# Patient Record
Sex: FEMALE | Race: BLACK OR AFRICAN AMERICAN | NOT HISPANIC OR LATINO | URBAN - METROPOLITAN AREA
[De-identification: names, ages, dates, MRNs, and addresses within clinical notes are randomized per-mention and may not be internally consistent; named-entity substitution may affect disease eponyms.]

---

## 2019-09-28 ENCOUNTER — INPATIENT (INPATIENT)
Facility: HOSPITAL | Age: 59
LOS: 5 days | Discharge: NURSING, REHAB OR LONG-TERM CARE | DRG: 65 | End: 2019-10-04
Attending: PSYCHIATRY & NEUROLOGY | Admitting: PSYCHIATRY & NEUROLOGY
Payer: COMMERCIAL

## 2019-09-28 VITALS
OXYGEN SATURATION: 99 % | SYSTOLIC BLOOD PRESSURE: 179 MMHG | HEART RATE: 88 BPM | TEMPERATURE: 98 F | RESPIRATION RATE: 18 BRPM | WEIGHT: 117.95 LBS | HEIGHT: 62 IN | DIASTOLIC BLOOD PRESSURE: 100 MMHG

## 2019-09-28 LAB
ALBUMIN SERPL ELPH-MCNC: 4.1 G/DL — SIGNIFICANT CHANGE UP (ref 3.3–5)
ALP SERPL-CCNC: 95 U/L — SIGNIFICANT CHANGE UP (ref 40–120)
ALT FLD-CCNC: 6 U/L — LOW (ref 10–45)
ANION GAP SERPL CALC-SCNC: 12 MMOL/L — SIGNIFICANT CHANGE UP (ref 5–17)
APTT BLD: 36.4 SEC — HIGH (ref 27.5–36.3)
AST SERPL-CCNC: 9 U/L — LOW (ref 10–40)
BASOPHILS # BLD AUTO: 0.1 K/UL — SIGNIFICANT CHANGE UP (ref 0–0.2)
BASOPHILS NFR BLD AUTO: 0.7 % — SIGNIFICANT CHANGE UP (ref 0–2)
BILIRUB SERPL-MCNC: 0.3 MG/DL — SIGNIFICANT CHANGE UP (ref 0.2–1.2)
BUN SERPL-MCNC: 48 MG/DL — HIGH (ref 7–23)
CALCIUM SERPL-MCNC: 10.3 MG/DL — SIGNIFICANT CHANGE UP (ref 8.4–10.5)
CHLORIDE SERPL-SCNC: 108 MMOL/L — SIGNIFICANT CHANGE UP (ref 96–108)
CO2 SERPL-SCNC: 21 MMOL/L — LOW (ref 22–31)
CREAT SERPL-MCNC: 1.74 MG/DL — HIGH (ref 0.5–1.3)
EOSINOPHIL # BLD AUTO: 0.1 K/UL — SIGNIFICANT CHANGE UP (ref 0–0.5)
EOSINOPHIL NFR BLD AUTO: 1.2 % — SIGNIFICANT CHANGE UP (ref 0–6)
GLUCOSE SERPL-MCNC: 182 MG/DL — HIGH (ref 70–99)
HCT VFR BLD CALC: 38.4 % — SIGNIFICANT CHANGE UP (ref 34.5–45)
HGB BLD-MCNC: 12.1 G/DL — SIGNIFICANT CHANGE UP (ref 11.5–15.5)
INR BLD: 0.85 RATIO — LOW (ref 0.88–1.16)
LYMPHOCYTES # BLD AUTO: 2 K/UL — SIGNIFICANT CHANGE UP (ref 1–3.3)
LYMPHOCYTES # BLD AUTO: 26 % — SIGNIFICANT CHANGE UP (ref 13–44)
MAGNESIUM SERPL-MCNC: 1.9 MG/DL — SIGNIFICANT CHANGE UP (ref 1.6–2.6)
MCHC RBC-ENTMCNC: 26.6 PG — LOW (ref 27–34)
MCHC RBC-ENTMCNC: 31.4 GM/DL — LOW (ref 32–36)
MCV RBC AUTO: 84.7 FL — SIGNIFICANT CHANGE UP (ref 80–100)
MONOCYTES # BLD AUTO: 0.5 K/UL — SIGNIFICANT CHANGE UP (ref 0–0.9)
MONOCYTES NFR BLD AUTO: 6.2 % — SIGNIFICANT CHANGE UP (ref 2–14)
NEUTROPHILS # BLD AUTO: 5.2 K/UL — SIGNIFICANT CHANGE UP (ref 1.8–7.4)
NEUTROPHILS NFR BLD AUTO: 66 % — SIGNIFICANT CHANGE UP (ref 43–77)
PHOSPHATE SERPL-MCNC: 3.8 MG/DL — SIGNIFICANT CHANGE UP (ref 2.5–4.5)
PLATELET # BLD AUTO: 277 K/UL — SIGNIFICANT CHANGE UP (ref 150–400)
POTASSIUM SERPL-MCNC: 5 MMOL/L — SIGNIFICANT CHANGE UP (ref 3.5–5.3)
POTASSIUM SERPL-SCNC: 5 MMOL/L — SIGNIFICANT CHANGE UP (ref 3.5–5.3)
PROT SERPL-MCNC: 7.9 G/DL — SIGNIFICANT CHANGE UP (ref 6–8.3)
PROTHROM AB SERPL-ACNC: 9.7 SEC — LOW (ref 10–12.9)
RBC # BLD: 4.54 M/UL — SIGNIFICANT CHANGE UP (ref 3.8–5.2)
RBC # FLD: 12.3 % — SIGNIFICANT CHANGE UP (ref 10.3–14.5)
SODIUM SERPL-SCNC: 141 MMOL/L — SIGNIFICANT CHANGE UP (ref 135–145)
WBC # BLD: 7.9 K/UL — SIGNIFICANT CHANGE UP (ref 3.8–10.5)
WBC # FLD AUTO: 7.9 K/UL — SIGNIFICANT CHANGE UP (ref 3.8–10.5)

## 2019-09-28 PROCEDURE — 99220: CPT

## 2019-09-28 PROCEDURE — 70450 CT HEAD/BRAIN W/O DYE: CPT | Mod: 26

## 2019-09-28 PROCEDURE — 93010 ELECTROCARDIOGRAM REPORT: CPT

## 2019-09-28 RX ORDER — INSULIN LISPRO 100/ML
3 VIAL (ML) SUBCUTANEOUS
Refills: 0 | Status: DISCONTINUED | OUTPATIENT
Start: 2019-09-28 | End: 2019-10-04

## 2019-09-28 RX ORDER — SODIUM CHLORIDE 9 MG/ML
1000 INJECTION, SOLUTION INTRAVENOUS
Refills: 0 | Status: DISCONTINUED | OUTPATIENT
Start: 2019-09-28 | End: 2019-10-04

## 2019-09-28 RX ORDER — DEXTROSE 50 % IN WATER 50 %
15 SYRINGE (ML) INTRAVENOUS ONCE
Refills: 0 | Status: DISCONTINUED | OUTPATIENT
Start: 2019-09-28 | End: 2019-10-04

## 2019-09-28 RX ORDER — INSULIN LISPRO 100/ML
VIAL (ML) SUBCUTANEOUS
Refills: 0 | Status: DISCONTINUED | OUTPATIENT
Start: 2019-09-28 | End: 2019-10-04

## 2019-09-28 RX ORDER — DEXTROSE 50 % IN WATER 50 %
25 SYRINGE (ML) INTRAVENOUS ONCE
Refills: 0 | Status: DISCONTINUED | OUTPATIENT
Start: 2019-09-28 | End: 2019-10-04

## 2019-09-28 RX ORDER — CLOPIDOGREL BISULFATE 75 MG/1
300 TABLET, FILM COATED ORAL ONCE
Refills: 0 | Status: COMPLETED | OUTPATIENT
Start: 2019-09-28 | End: 2019-09-28

## 2019-09-28 RX ORDER — ASPIRIN/CALCIUM CARB/MAGNESIUM 324 MG
81 TABLET ORAL DAILY
Refills: 0 | Status: DISCONTINUED | OUTPATIENT
Start: 2019-09-28 | End: 2019-10-04

## 2019-09-28 RX ORDER — DEXTROSE 50 % IN WATER 50 %
12.5 SYRINGE (ML) INTRAVENOUS ONCE
Refills: 0 | Status: DISCONTINUED | OUTPATIENT
Start: 2019-09-28 | End: 2019-10-04

## 2019-09-28 RX ORDER — GLUCAGON INJECTION, SOLUTION 0.5 MG/.1ML
1 INJECTION, SOLUTION SUBCUTANEOUS ONCE
Refills: 0 | Status: DISCONTINUED | OUTPATIENT
Start: 2019-09-28 | End: 2019-10-04

## 2019-09-28 RX ADMIN — Medication 81 MILLIGRAM(S): at 21:15

## 2019-09-28 RX ADMIN — Medication 3 UNIT(S): at 20:15

## 2019-09-28 RX ADMIN — CLOPIDOGREL BISULFATE 300 MILLIGRAM(S): 75 TABLET, FILM COATED ORAL at 21:15

## 2019-09-28 NOTE — ED CDU PROVIDER INITIAL DAY NOTE - OBJECTIVE STATEMENT
58 yo F PMHx R eye blindness, CVA 3 yrs ago with residual "veering to right side" and so uses cane, HTN, DM on Insulin and Janumet, presents for Right lower facial numbness and right hand numbness since 6:45pm last night after eating frog legs, described as pins and needles. The numbness persisted and has been constant, which brings her here for evaluation.   Denies HA, weakness, slurred speech, n/v, diplopia, blurred vision, dizziness, cp, sob, fever, cough. L sided numbness. Pt is right hand dominant and does have some trouble signing her name after event. PMD in [place]

## 2019-09-28 NOTE — ED PROVIDER NOTE - ATTENDING CONTRIBUTION TO CARE
attending Johnack: pt R-hand dominant with DMII on insulin, HTN p/w numbness to R lower face and R hand since yesterday. Reports prior TIA (asphasia and R hand clumsiness without residual). Denies HA. Neuro exam with subjective decreased sensation over palmar aspect of R hand and R lower face. Will obtain labs, CTH, likely neuro eval, reassess.

## 2019-09-28 NOTE — ED CDU PROVIDER INITIAL DAY NOTE - ATTENDING CONTRIBUTION TO CARE
I have seen and evaluated this patient with the [place] practice clinician.   I agree with the findings  unless other wise stated. I have amended notes where needed.  After my face to face bedside evaluation, I am noting: . Feels well, denies complaints at this time. No h/a, vision changes, new numbness/tingling, new weakness. +Chronically blind in R eye. Slight decreased sensation to R hand, reportedly unchanged from previous per pt. Otherwise neuro exam w/ no focal deficits or findings. Plan for MRI in AM. D/w neuro as note saws MRI brain w/o but no mention of MRAs however day PA put MRA orders in. They are agreeable w/ MRAs, feel appropriate studies so orders left in. Confirmed loading pt w/ plavix and ASA. I have seen and evaluated this patient with the [place] practice clinician.   I agree with the findings  unless other wise stated. I have amended notes where needed.  After my face to face bedside evaluation, I am noting: . Feels well, denies complaints at this time. No h/a, vision changes, new numbness/tingling, new weakness. +Chronically blind in R eye. Slight decreased sensation to R hand, reportedly unchanged from previous per pt. Otherwise neuro exam w/ no focal deficits or findings. Plan for MRI in AM. D/w neuro as note saws MRI brain w/o but no mention of MRAs however day PA put MRA orders in. They are agreeable w/ MRAs, feel appropriate studies so orders left in. Confirmed loading pt w/ plavix and ASA. Pt evaluated by Neuro has new onset of numbness right face and right hand no trauma has DM s/p CVA Alert poorly developed no distress MARNIE EOM wnl altered sensation right V2  and V3 area and right hand only vascular intact No carotid bruit will have neuro check Brain MRI CDU observation --Ross

## 2019-09-28 NOTE — ED PROVIDER NOTE - PHYSICAL EXAMINATION
PHYSICAL EXAM:  GENERAL: non-toxic appearing; in no respiratory distress  HEAD: Atraumatic, Normocephalic;  NECK: No JVD; FROM  EYES: PERRL, EOMs intact b/l w/out deficits  CHEST/LUNG: CTAB no wheezes/rhonchi/rales  HEART: RRR no murmur/gallops/rubs  ABDOMEN: +BS, soft, NT, ND  EXTREMITIES: No LE edema, +2 radial pulses b/l  MUSCULOSKELETAL: FROM of all 4 extremities;  NERVOUS SYSTEM:  A&Ox3, No motor deficits. CNII-XII intact; Decreased sensation on Right hand diffusely vs left hand; sensations intact to her upper and lower face. no facial droop or slurred speech  SKIN:  No new rashes

## 2019-09-28 NOTE — ED CDU PROVIDER INITIAL DAY NOTE - MEDICAL DECISION MAKING DETAILS
60 yo F PMHx R eye blindness, CVA 3 yrs ago with residual "veering to right side" and so uses cane, HTN, DM on Insulin and Janumet, presents for Right lower facial numbness and right hand numbness since 6:45pm last night after eating frog legs, described as pins and needles. The numbness persisted and has been constant, which brings her here for evaluation.   Denies HA, weakness, slurred speech, n/v, diplopia, blurred vision, dizziness, cp, sob, fever, cough. L sided numbness. Pt is right hand dominant and does have some trouble signing her name after event.

## 2019-09-28 NOTE — ED CDU PROVIDER INITIAL DAY NOTE - PROGRESS NOTE DETAILS
Patient seen at bedside in NAD.  VSS.  Patient resting comfortably without complaints. Feels well, denies complaints at this time. No h/a, vision changes, new numbness/tingling, new weakness. +Chronically blind in R eye. neuro exam w/ no focal deficits or findings. Plan for MRI in AM. D/w neuro as note saws MRI brain w/o but no mention of MRAs however day PA put MRA orders in. They are agreeable w/ MRAs, feel appropriate studies so orders left in. Confirmed loading pt w/ plavix and ASA. Orders in. Will continue to monitor. - Giles Martinez PA-C Patient seen at bedside in NAD.  VSS.  Patient resting comfortably without complaints. Feels well, denies complaints at this time. No h/a, vision changes, new numbness/tingling, new weakness. +Chronically blind in R eye. Slight decreased sensation to R hand, reportedly unchanged from previous per pt. Otherwise neuro exam w/ no focal deficits or findings. Plan for MRI in AM. D/w neuro as note saws MRI brain w/o but no mention of MRAs however day PA put MRA orders in. They are agreeable w/ MRAs, feel appropriate studies so orders left in. Confirmed loading pt w/ plavix and ASA. Orders in. Will continue to monitor. - Giles Martinez PA-C

## 2019-09-28 NOTE — ED PROVIDER NOTE - NS ED ROS FT
Constitutional: no fevers or chills  HEENT: no visual changes, no sore throat, no rhinorrhea  CV: no cp or palpitations  Resp: no sob or cough  GI: no abd pain, no nausea, no vomiting, no diarrhea, no constipation  : no dysuria, no hematuria  MSK: no myalgais or arthralgias  skin: no rashes  neuro: no HA, +numbness; no weakness  ROS statement: all other ROS negative except as per HPI

## 2019-09-28 NOTE — ED PROVIDER NOTE - PROGRESS NOTE DETAILS
paged neuro. awaiting call back disscussed case w/ neuro. will see pt neuro recommending cdu for mri. spoke w/ cdu pa who will see pt Pt evaluated by Neuro has new onset of numbness right face and right hand no trauma has DM s/p CVA Alert poorly developed no distress MARNIE EOM wnl altered sensation right V2  and V3 area and right hand only vascular intact No carotid bruit will have neuro check Brain MRI CDU observation --Ross

## 2019-09-28 NOTE — ED PROVIDER NOTE - CLINICAL SUMMARY MEDICAL DECISION MAKING FREE TEXT BOX
Continue lisinopril 60 yo F PMHx CVA 3 years ago w/o residual deficits, htn, dm, presents for constant numbness to right hand and right lower face. no motor deficits or slurred speech. exam shows subjective decreased sensation to right hand vs left. no slurred speech or facial droop. will obtain ct head, labs, and consult neuro for possible cdu vs outpatient mri.

## 2019-09-28 NOTE — CONSULT NOTE ADULT - ASSESSMENT
Assessment: 59 year old RH F with a PMH of stroke? 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 LKN was 18:30 on 9/27. Physical exam significant for right hand numbness. CTH w/o showed no acute findings.     Impression: Right face/hand numbness possibly secondary to left brain dysfunction from an ischemic stroke of unclear mechanism (likely lacunar stroke)    Plan:  Imaging:   MRI brain without contrast    Labs:  Hemoglobin A1c and lipid panel    Medications:  Start aspirin 81 mg daily.  Load with Plavix 300 mg once. Then starting 9/29 give plavix 75 mg daily for three weeks, then aspirin alone as per [facility] protocol.  Continue atorvastatin 80 mg nightly.    Other:  Dysphagia screen  Permissive HTN (not to exceed 220/110 until 9/29, then gradual normotension)  Neurochecks q4h  Patient requires outpatient neurology follow up. (Either [facility] neurologist or Nadine Jarrett, AJAY [phone], [address] [facility])    Case to be discussed with stroke attending, Dr. Hernández. Assessment: 59 year old RH F with a PMH of stroke? 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 LKN was 18:30 on 9/27. Physical exam significant for right hand numbness. CTH w/o showed no acute findings.     Impression: Right face/hand numbness possibly secondary to left brain dysfunction from an ischemic stroke of unclear mechanism (likely lacunar stroke)    Plan:  Imaging:   MRI brain without contrast  MRA head without contrast, MRA neck with contrast    Labs:  Hemoglobin A1c and lipid panel    Medications:  Start aspirin 81 mg daily.  Load with Plavix 300 mg once. Then starting 9/29 give plavix 75 mg daily for three weeks, then aspirin alone as per [facility] protocol.  Continue atorvastatin 80 mg nightly.    Other:  Dysphagia screen  Permissive HTN (not to exceed 220/110 until 9/29, then gradual normotension)  Neurochecks q4h  Patient requires outpatient neurology follow up. (Either [facility] neurologist or Nadine Jarrett, AJAY [phone], [address] [place] [address])    Case to be discussed with stroke attending, Dr. Hernández.

## 2019-09-28 NOTE — ED ADULT NURSE NOTE - OBJECTIVE STATEMENT
58 yo f pmhx of HTN, DM, CVA present to the ED with c/o right lower facial numbness and right hand numbness not radiating. PT reports she started feeling the numbness around 6:30 last night, states she felt it after having a meal. Pt reports the numbness does not radiate but its also at the right lower face. Pt reports not checking her blood glucose frequently because is afraid of needles but wants to be assessed while in the ED. Pt has pos and equal sensation to extremities bilat, but reports numbness, pos and equal strength to extremities x 4, strong peripheral pulses x 4. Pt denies headache, dizziness, chest pain, palpitations, cough, SOB, abdominal pain, n/v/d, urinary symptoms, fevers, chills, blurry vision, HA.

## 2019-09-28 NOTE — ED ADULT TRIAGE NOTE - CHIEF COMPLAINT QUOTE
Right hand, and R facial numbness onset last night after eating "frog legs" . PMH CVA two years ago, and DM.

## 2019-09-29 DIAGNOSIS — R20.0 ANESTHESIA OF SKIN: ICD-10-CM

## 2019-09-29 LAB
CHOLEST SERPL-MCNC: 310 MG/DL — HIGH (ref 10–199)
GLUCOSE BLDC GLUCOMTR-MCNC: 105 MG/DL — HIGH (ref 70–99)
HBA1C BLD-MCNC: 9.4 % — HIGH (ref 4–5.6)
HDLC SERPL-MCNC: 49 MG/DL — LOW
LIPID PNL WITH DIRECT LDL SERPL: 211 MG/DL — HIGH
TOTAL CHOLESTEROL/HDL RATIO MEASUREMENT: 6.3 RATIO — SIGNIFICANT CHANGE UP (ref 3.3–7.1)
TRIGL SERPL-MCNC: 248 MG/DL — HIGH (ref 10–149)
TSH SERPL-MCNC: 1.38 UIU/ML — SIGNIFICANT CHANGE UP (ref 0.27–4.2)

## 2019-09-29 PROCEDURE — 70551 MRI BRAIN STEM W/O DYE: CPT | Mod: 26

## 2019-09-29 PROCEDURE — 70547 MR ANGIOGRAPHY NECK W/O DYE: CPT | Mod: 26

## 2019-09-29 PROCEDURE — 70544 MR ANGIOGRAPHY HEAD W/O DYE: CPT | Mod: 26,59

## 2019-09-29 PROCEDURE — 99223 1ST HOSP IP/OBS HIGH 75: CPT

## 2019-09-29 PROCEDURE — 99217: CPT

## 2019-09-29 RX ORDER — INSULIN ASPART 100 [IU]/ML
10 INJECTION, SOLUTION SUBCUTANEOUS
Qty: 0 | Refills: 0 | DISCHARGE

## 2019-09-29 RX ORDER — ATORVASTATIN CALCIUM 80 MG/1
80 TABLET, FILM COATED ORAL AT BEDTIME
Refills: 0 | Status: DISCONTINUED | OUTPATIENT
Start: 2019-09-29 | End: 2019-10-04

## 2019-09-29 RX ORDER — LATANOPROST 0.05 MG/ML
1 SOLUTION/ DROPS OPHTHALMIC; TOPICAL
Qty: 0 | Refills: 0 | DISCHARGE

## 2019-09-29 RX ORDER — ENOXAPARIN SODIUM 100 MG/ML
30 INJECTION SUBCUTANEOUS DAILY
Refills: 0 | Status: DISCONTINUED | OUTPATIENT
Start: 2019-09-29 | End: 2019-10-04

## 2019-09-29 RX ORDER — SITAGLIPTIN AND METFORMIN HYDROCHLORIDE 500; 50 MG/1; MG/1
1 TABLET, FILM COATED ORAL
Qty: 0 | Refills: 0 | DISCHARGE

## 2019-09-29 RX ORDER — INSULIN DETEMIR 100/ML (3)
0 INSULIN PEN (ML) SUBCUTANEOUS
Qty: 0 | Refills: 0 | DISCHARGE

## 2019-09-29 RX ADMIN — ATORVASTATIN CALCIUM 80 MILLIGRAM(S): 80 TABLET, FILM COATED ORAL at 22:30

## 2019-09-29 RX ADMIN — Medication 81 MILLIGRAM(S): at 09:45

## 2019-09-29 RX ADMIN — Medication 3 UNIT(S): at 13:09

## 2019-09-29 RX ADMIN — Medication 3 UNIT(S): at 09:41

## 2019-09-29 NOTE — H&P ADULT - NSHPSOCIALHISTORY_GEN_ALL_CORE
T/E/D: last smoked/last used alcohol 10 years ago. patient used to smoke 1 pack during the weekend.   Occupation: retired (worked in police department)

## 2019-09-29 NOTE — H&P ADULT - NSICDXFAMILYHX_GEN_ALL_CORE_FT
FAMILY HISTORY:  Family history of cerebrovascular accident (CVA)  No pertinent family history in first degree relatives

## 2019-09-29 NOTE — ED CDU PROVIDER SUBSEQUENT DAY NOTE - PROGRESS NOTE DETAILS
Patient sleeping. NAD. No complaints. VSS. No interval changes from previous CDU note. No events on tele. Will continue to monitor. - Giles Martinez PA-C MOUNIKA Jarvis: Patient seen at bedside in NAD.  VSS.  Patient resting comfortably but does note decreased right hand sensation. No events noted over tele. Subjective numbness on neuro exam otherwise neuro intact for patient baseline. No events noted over tele. pending MRI and neuro input MOUNIKA Jarvis: made neuro PA Riri aware final MRI report and will not be able to see patient for some time as attending is in IR case. Patient and family aware MOUNIKA Jarvis: Patient seen at bedside in NAD.  VSS.  Patient resting comfortably without complaints. pending neuro recommendations. no events noted over tele. MOUNIKA Jarvis: spoke to neuro MOUNIKA Menezes and attending just got out of OR. should be in CDU shortly MOUNIKA Jarvis: patient evaluated by neuro attending Dr. Ortiz for left thalamic infarct and recommended admission to neuro service for endovascular treatment.

## 2019-09-29 NOTE — H&P ADULT - ASSESSMENT
59 year old RH F with a PMH of stroke? 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 LKN was 18:30 on 9/27. Physical exam significant for right hand numbness. CTH w/o showed no acute findings.     Impression: Right face/hand numbness possibly secondary to left brain dysfunction from an ischemic stroke of unclear mechanism (likely lacunar stroke)    Plan:  Imaging:   MRI brain without contrast  MRA head without contrast, MRA neck with contrast    Labs:  Hemoglobin A1c and lipid panel    Medications:  Start aspirin 81 mg daily.  Load with Plavix 300 mg once. Then starting 9/29 give plavix 75 mg daily for three weeks, then aspirin alone as per [facility] protocol.  Continue atorvastatin 80 mg nightly.    Other:  Dysphagia screen  Permissive HTN (not to exceed 220/110 until 9/29, then gradual normotension)  Neurochecks q4h  Patient requires outpatient neurology follow up. (Either [facility] neurologist or Nadine Jarrett, [phone], [address] [facility])

## 2019-09-29 NOTE — H&P ADULT - ATTENDING COMMENTS
Agree with history and examination as stated above. Patient was examined today and neurological exam was pertinent for right upper extremities paresthesias. Neuroimaging: Brain MRI revealed restricted diffusion at the left thalamus. Head and neck MRA with areas of significant constriction follow by dilation (beads in a string pattern) at  the anterior and posterior cerebral arteries concerning for vasculitis.    Impression diagnosis-cerebral thrombosis with infarction etiology small vessel disease will evaluate for vasculitis.     Plan:   1. Aspirin for secondary stroke prevention measures  2.  goal <70 high dose statin initiated  3. HgA1C 9.4 recommended endocrine for therapy adjustment and diabetes education   4. Work up for vasculitis-ESR  CRP  MARILOU  DSDNA  ANCA Anti Ro- Anti LA  ELO HIV   Toxicology in urine and serum  5. Adequate hydration and follow up BMP in the setting of kidney injury unknown baseline  6. LP   7. Will consider cerebral angiogram to definitively exclude vasculitis  8. Rheumatology consult   9. Pharmacological DVT prophylaxis   10. PT/PTT/OT

## 2019-09-29 NOTE — ED CDU PROVIDER DISPOSITION NOTE - NSFOLLOWUPINSTRUCTIONS_ED_ALL_ED_FT
1. Follow up with your PMD in 1-2 days, please have your creatinine level repeated with your PMD. Additionally please follow up with either your neurologist or NP Nadine Jarrett at our neurology clinic ([address] [place] [place] [address]; [phone]) within 2-3 days. Please call to schedule an appointment. Please bring a copy of all results with you to your appointments.   2. Rest, keep self well hydrated. Continue home medications as prescribed, Stroke education packet was given to you for further information.  3. Start Plavix 75 mg once daily for 3 weeks in addition to Aspirin 81 mg daily. After 3 weeks, stop Plavix and continue Aspirin. Continue Atorvastatin 80 mg every night.  4. Return for any weakness, numbness, change in speech or vision, problems walking or any other concerning symptoms.

## 2019-09-29 NOTE — ED ADULT NURSE REASSESSMENT NOTE - NIH STROKE SCALE: 8. SENSORY, QM
(0) Normal; no sensory loss
(0) Normal; no sensory loss
(1) Mild-to-moderate sensory loss; patient feels pinprick is less sharp or is dull on the affected side; or there is a loss of superficial pain with pinprick, but patient is aware of being touched

## 2019-09-29 NOTE — ED CDU PROVIDER SUBSEQUENT DAY NOTE - MUSCULOSKELETAL, MLM
Spine appears normal, range of motion is not limited, no muscle or joint tenderness Spine appears normal, range of motion is not limited, no muscle or joint tenderness. 5/5 strength UE/LE b/l.

## 2019-09-29 NOTE — H&P ADULT - NSICDXPASTMEDICALHX_GEN_ALL_CORE_FT
PAST MEDICAL HISTORY:  CVA (cerebral vascular accident)     Diabetes     Diabetes mellitus     Hypertension

## 2019-09-29 NOTE — ED CDU PROVIDER SUBSEQUENT DAY NOTE - HISTORY
Patient in CDU for further workup of R side facial and hand numbness w/ MRI/MRA to r/o stroke. Patient seen at bedside in NAD.  VSS.  Patient resting comfortably without complaints. No events on tele. No interval changes from previous CDU note. Pt still endorsing decreased sensation to R hand which is appreciable on exam, unchanged from previous. Denies headache, n/v/d, new weakness, speech/visual changes, cp, sob, neck pain. Appears very well. Plan for MRI/MRA in AM. Will continue to closely monitor. - Giles Martinez PA-C

## 2019-09-29 NOTE — ED ADULT NURSE REASSESSMENT NOTE - GENERAL PATIENT STATE
comfortable appearance/cooperative/improvement verbalized
improvement verbalized/cooperative/smiling/interactive/comfortable appearance/resting/sleeping
smiling/interactive/comfortable appearance/cooperative

## 2019-09-29 NOTE — ED ADULT NURSE REASSESSMENT NOTE - COMFORT CARE
darkened lights/plan of care explained/warm blanket provided/ambulated to bathroom/meal provided/po fluids offered/repositioned
meal provided/po fluids offered/plan of care explained/ambulated to bathroom
plan of care explained/po fluids offered

## 2019-09-29 NOTE — ED CDU PROVIDER DISPOSITION NOTE - CLINICAL COURSE
58 yo female pmhx CVA w/ residual rightward gait deviation, HTN, T2DM on insulin, R eye blindness presents to the ED c/o R facial and R hand numbness that began last night after eating frog legs. Symptoms have been constant since onset. No associated pain w/ sxs. Pt came to ED for persistent symptoms. Denies headache, n/v/d, speech/visual changes, fall, head trauma, chest pain, shortness of breath, dizziness, neck pain.  ED Course: Labs notable for Cr of 1.74 CTH negative for acute pathology. Neurology consulted recommended MRI head and MRA h/n for further evaluation to r/o stroke, Plavix 300 mg x 1 dose followed by 75 mg daily for 3 weeks along with ASA. Pt did well in CDU overnight. Exam remained c/w R hand numbness compared to L but revealed no other focal neurological deficits. No adverse events on tele. MRI/MRA showed _________________. 60 yo female pmhx CVA w/ residual rightward gait deviation, HTN, T2DM on insulin, R eye blindness presents to the ED c/o R facial and R hand numbness that began last night after eating frog legs. Symptoms have been constant since onset. No associated pain w/ sxs. Pt came to ED for persistent symptoms. Denies headache, n/v/d, speech/visual changes, fall, head trauma, chest pain, shortness of breath, dizziness, neck pain.  ED Course: Labs notable for Cr of 1.74 CTH negative for acute pathology. Neurology consulted recommended MRI head and MRA h/n for further evaluation to r/o stroke, Plavix 300 mg x 1 dose followed by 75 mg daily for 3 weeks along with ASA. Pt did well in CDU overnight. Exam remained c/w R hand numbness compared to L but revealed no other focal neurological deficits. No adverse events on tele. No new symptoms were reported. MRI/MRA showed _________________. 58 yo female pmhx CVA w/ residual rightward gait deviation, HTN, T2DM on insulin, R eye blindness presents to the ED c/o R facial and R hand numbness that began last night after eating frog legs. Symptoms have been constant since onset. No associated pain w/ sxs. Pt came to ED for persistent symptoms. Denies headache, n/v/d, speech/visual changes, fall, head trauma, chest pain, shortness of breath, dizziness, neck pain.  ED Course: Labs notable for Cr of 1.74 CTH negative for acute pathology. Neurology consulted recommended MRI head and MRA h/n for further evaluation to r/o stroke, Plavix 300 mg x 1 dose followed by 75 mg daily for 3 weeks along with ASA. Pt did well in CDU overnight. Exam remained c/w R hand numbness compared to L but revealed no other focal neurological deficits. No adverse events on tele. No new symptoms were reported.  patient evaluated by neuro attending Dr. Ortiz for left thalamic infarct seen on MRI and recommended admission to neuro service for endovascular treatment. ED attending Dr. Vela aware of above and agreed

## 2019-09-29 NOTE — H&P ADULT - HISTORY OF PRESENT ILLNESS
59 year old RH F with a PMH of stroke? 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 LKN was 18:30 on 9/27.  Patient had previously taken aspirin, but does not remember why she decided to stop taking it. Patient does not have a neurologist. Patient ambulates with a cane due to right sided weakness/states she drifts to the right without a cane for the past 3 years. Patient states that she gets physical therapy twice per week.     NIHSS: 1 (right hand numbness)  MRS: 2

## 2019-09-29 NOTE — ED CDU PROVIDER SUBSEQUENT DAY NOTE - ATTENDING CONTRIBUTION TO CARE
59F placed in CDU for r/o stroke. ED course - Labs notable for Cr of 1.74 CTH negative for acute pathology. Neurology consulted recommended MRI head and MRA for further evaluation to r/o stroke, Plavix 300 mg x 1 dose followed by 75 mg daily for 3 weeks along with ASA. Exam remained c/w R hand numbness compared to L but revealed no other focal neurological deficits. No adverse events on tele. No new symptoms were reported.  Patient seen and examined at bedside, no acute overnight events, reports feeling better. During afternoon rounds patient evaluated by neuro attending Dr. Ortiz for left thalamic infarct seen on MRI and recommended admission to neuro service for endovascular treatment.    PHYSICAL EXAMINATION:  VITALS REVIEWED.  VS normal  GENERALIZED APPEARANCE:  Comfortable, no acute distress, ambulating without difficulty  SKIN:  Warm, dry, no cyanosis  HEAD:  No obvious scalp lesions  EYES:  Conjunctiva pink, no icterus  ENMT:  Mucus membranes moist, no stridor  NECK:  Supple, non-tender  CHEST AND RESPIRATORY:  Clear to auscultation B/L, good air entry B/L, equal chest expansion  HEART AND CARDIOVASCULAR:  Regular rate, no obvious murmur  ABDOMEN AND GI:  Soft, non-tender, non-distended.  No rebound, no guarding  EXTREMITIES:  No deformity, edema, or calf tenderness  NEURO: AAOx3, right upper extremity  Diminished sensation to R hand to light touch compared to L hand, CN2-12 intact, no dysmetria, no dysdiadochokinesia    Impression:  Stroke; L thalamic infarct ?vasculitis v. vasospasm  No acute complaints at this time.

## 2019-09-29 NOTE — ED ADULT NURSE REASSESSMENT NOTE - NIH STROKE SCALE: 1B. LOC QUESTIONS, QM
(0) Answers both questions correctly

## 2019-09-29 NOTE — H&P ADULT - NSHPPHYSICALEXAM_GEN_ALL_CORE
Vital Signs Last 24 Hrs  T(C): 36.5 (29 Sep 2019 13:20), Max: 36.8 (28 Sep 2019 20:04)  T(F): 97.7 (29 Sep 2019 13:20), Max: 98.3 (28 Sep 2019 20:04)  HR: 77 (29 Sep 2019 13:20) (65 - 83)  BP: 141/89 (29 Sep 2019 13:20) (125/80 - 165/106)  BP(mean): --  RR: 19 (29 Sep 2019 13:20) (17 - 19)  SpO2: 100% (29 Sep 2019 13:20) (99% - 100%)    General: Female, appears stated age, in no apparent distress including pain  Neurological (>12):  MS: Awake, alert, oriented to person, place, situation, time. Normal affect. Follows all commands.  Language: Speech is clear, fluent with good repetition & comprehension (able to name objects)  CNs: Visual acuity OD: NLP, VFF OS only. EOMI no nystagmus. V1-3 intact to LT, no facial asymmetry b/l, hearing grossly normal (rubbing fingers) b/l. Symmetric palate elevation in midline. Gag reflex deferred. Head turning & shoulder shrug intact b/l. Tongue midline, normal movements, no atrophy.  Fundoscopic: pale w/ sharp discs margins No vascular changes.    Motor: Normal muscle bulk & tone. No noticeable tremor or seizure. No pronator drift. No motor drift.              Deltoid	Biceps	Triceps	Wrist	Finger ABd	   R	4+	4+	4+	4+	4+		4+ 	  L	4+	4+	4+	4+	4+		4+    	H-Flex	H-Ext			K-Flex	K-Ext	D-Flex	P-Flex  R	4+	5			4	4	5	5 	   L	5	5			4+	4	5	5	     Sensation: decreased to temperature/LT over C6/7/8 dermatomes of right hand.   Cortical: Extinction on DSS (neglect): none  Reflexes:              Biceps(C5)       BR(C6)     Triceps(C7)               Patellar(L4)    Achilles(S1)    Plantar Resp  R	2	          2	             2		        2		    2		equivocal  L	2	          2	             2		        2		    2		equivocal    Coordination: No dysmetria to FTN/HTS  Gait: unable to assess

## 2019-09-29 NOTE — ED ADULT NURSE REASSESSMENT NOTE - NS ED NURSE REASSESS COMMENT FT1
Report given to CDU RN Kaya.
report taken from Kaya PASTRANA. states pt had good night with no complaints. Will continue to monitor.
Received pt from VICTORIANO Little , received pt alert and responsive, oriented x4, denies any respiratory distress, SOB, or difficulty breathing. Pt transferred to CDU for MRI,. no deficits noted. Pt denies pain.  On telemetry pt is SR on monitor hr: 83. IV in place, patent and free of signs of infiltration, pt denies chest pain or palpitations, V/S stable, pt afebrile.  Pt educated on unit and unit rules, instructed patient to notify RN of any needed assistance, Pt verbalizes understanding, Call bell placed within reach. Safety maintained. Will continue to monitor.
Pt received from VICTORIANO Huerta. Pt oriented to CDU & plan of care was discussed. Pt AO4. Neuro check intact except pt endorses R hand numbness & R face numbness. No other deficits noted. Pt denies any weakness or headache. Safety & comfort measures maintained. Call bell in reach. Will continue to monitor.

## 2019-09-30 LAB
ANION GAP SERPL CALC-SCNC: 10 MMOL/L — SIGNIFICANT CHANGE UP (ref 5–17)
ANION GAP SERPL CALC-SCNC: 12 MMOL/L — SIGNIFICANT CHANGE UP (ref 5–17)
BUN SERPL-MCNC: 55 MG/DL — HIGH (ref 7–23)
BUN SERPL-MCNC: 60 MG/DL — HIGH (ref 7–23)
CALCIUM SERPL-MCNC: 10.3 MG/DL — SIGNIFICANT CHANGE UP (ref 8.4–10.5)
CALCIUM SERPL-MCNC: 10.4 MG/DL — SIGNIFICANT CHANGE UP (ref 8.4–10.5)
CHLORIDE SERPL-SCNC: 104 MMOL/L — SIGNIFICANT CHANGE UP (ref 96–108)
CHLORIDE SERPL-SCNC: 110 MMOL/L — HIGH (ref 96–108)
CHOLEST SERPL-MCNC: 384 MG/DL — HIGH (ref 10–199)
CO2 SERPL-SCNC: 13 MMOL/L — LOW (ref 22–31)
CO2 SERPL-SCNC: 20 MMOL/L — LOW (ref 22–31)
CREAT SERPL-MCNC: 1.8 MG/DL — HIGH (ref 0.5–1.3)
CREAT SERPL-MCNC: 1.93 MG/DL — HIGH (ref 0.5–1.3)
CRP SERPL-MCNC: 0.16 MG/DL — SIGNIFICANT CHANGE UP (ref 0–0.4)
DSDNA AB FLD-ACNC: <0.2 AI — SIGNIFICANT CHANGE UP
ENA SS-A AB FLD IA-ACNC: <0.2 AI — SIGNIFICANT CHANGE UP
ERYTHROCYTE [SEDIMENTATION RATE] IN BLOOD: 104 MM/HR — HIGH (ref 0–20)
ESTIMATED AVERAGE GLUCOSE: 229 MG/DL — HIGH (ref 68–114)
GLUCOSE BLDC GLUCOMTR-MCNC: 129 MG/DL — HIGH (ref 70–99)
GLUCOSE BLDC GLUCOMTR-MCNC: 130 MG/DL — HIGH (ref 70–99)
GLUCOSE BLDC GLUCOMTR-MCNC: 153 MG/DL — HIGH (ref 70–99)
GLUCOSE SERPL-MCNC: 150 MG/DL — HIGH (ref 70–99)
GLUCOSE SERPL-MCNC: 153 MG/DL — HIGH (ref 70–99)
HBA1C BLD-MCNC: 9.6 % — HIGH (ref 4–5.6)
HBA1C BLD-MCNC: 9.6 % — HIGH (ref 4–5.6)
HCT VFR BLD CALC: 41.6 % — SIGNIFICANT CHANGE UP (ref 34.5–45)
HCV AB S/CO SERPL IA: 0.08 S/CO — SIGNIFICANT CHANGE UP (ref 0–0.99)
HCV AB SERPL-IMP: SIGNIFICANT CHANGE UP
HDLC SERPL-MCNC: 64 MG/DL — SIGNIFICANT CHANGE UP
HGB BLD-MCNC: 12.2 G/DL — SIGNIFICANT CHANGE UP (ref 11.5–15.5)
LIPID PNL WITH DIRECT LDL SERPL: 270 MG/DL — HIGH
MCHC RBC-ENTMCNC: 25.6 PG — LOW (ref 27–34)
MCHC RBC-ENTMCNC: 29.3 GM/DL — LOW (ref 32–36)
MCV RBC AUTO: 87.4 FL — SIGNIFICANT CHANGE UP (ref 80–100)
PLATELET # BLD AUTO: 244 K/UL — SIGNIFICANT CHANGE UP (ref 150–400)
POTASSIUM SERPL-MCNC: 5.1 MMOL/L — SIGNIFICANT CHANGE UP (ref 3.5–5.3)
POTASSIUM SERPL-MCNC: 5.5 MMOL/L — HIGH (ref 3.5–5.3)
POTASSIUM SERPL-SCNC: 5.1 MMOL/L — SIGNIFICANT CHANGE UP (ref 3.5–5.3)
POTASSIUM SERPL-SCNC: 5.5 MMOL/L — HIGH (ref 3.5–5.3)
RBC # BLD: 4.76 M/UL — SIGNIFICANT CHANGE UP (ref 3.8–5.2)
RBC # FLD: 13.3 % — SIGNIFICANT CHANGE UP (ref 10.3–14.5)
SODIUM SERPL-SCNC: 134 MMOL/L — LOW (ref 135–145)
SODIUM SERPL-SCNC: 135 MMOL/L — SIGNIFICANT CHANGE UP (ref 135–145)
TOTAL CHOLESTEROL/HDL RATIO MEASUREMENT: 6 RATIO — SIGNIFICANT CHANGE UP (ref 3.3–7.1)
TRIGL SERPL-MCNC: 251 MG/DL — HIGH (ref 10–149)
WBC # BLD: 6.15 K/UL — SIGNIFICANT CHANGE UP (ref 3.8–10.5)
WBC # FLD AUTO: 6.15 K/UL — SIGNIFICANT CHANGE UP (ref 3.8–10.5)

## 2019-09-30 PROCEDURE — 99233 SBSQ HOSP IP/OBS HIGH 50: CPT

## 2019-09-30 PROCEDURE — 99223 1ST HOSP IP/OBS HIGH 75: CPT | Mod: GC

## 2019-09-30 PROCEDURE — 71045 X-RAY EXAM CHEST 1 VIEW: CPT | Mod: 26

## 2019-09-30 PROCEDURE — 99222 1ST HOSP IP/OBS MODERATE 55: CPT | Mod: GC

## 2019-09-30 RX ADMIN — Medication 3 UNIT(S): at 18:08

## 2019-09-30 RX ADMIN — Medication 3 UNIT(S): at 13:02

## 2019-09-30 RX ADMIN — Medication 1: at 08:50

## 2019-09-30 RX ADMIN — ATORVASTATIN CALCIUM 80 MILLIGRAM(S): 80 TABLET, FILM COATED ORAL at 23:02

## 2019-09-30 RX ADMIN — Medication 81 MILLIGRAM(S): at 12:18

## 2019-09-30 RX ADMIN — Medication 3 UNIT(S): at 08:51

## 2019-09-30 RX ADMIN — ENOXAPARIN SODIUM 30 MILLIGRAM(S): 100 INJECTION SUBCUTANEOUS at 12:18

## 2019-09-30 NOTE — CONSULT NOTE ADULT - ASSESSMENT
ASSESSMENT    59y Female with functional deficits after acute left thalamic infarct    Acute left thalamic infarct - management as per neurology, aspirin  DM2 - insulin, sliding scale  HLD - atorvastatin  Pain - Tylenol  DVT PPX - SCDs, Lovenox  Diet - Regular  Rehab -   Continue bedside therapy as well as OOB throughout the day with mobilization by staff to maintain cardiopulmonary function and prevention of secondary complications related to debility.   PT- ROM, Bed Mobility, transfers, amb w AD, GCEs  OT- ADLs, energy conservation  SLP- Dysphagia eval and tx    Recommend ACUTE inpatient rehabilitation for the functional deficits consisting of 3 hours of therapy/day & 24 hour RN/daily PMR physician for comorbid medical management. Patient will be able to tolerate 3 hours a day.    Will continue to follow for ongoing rehab needs and recommendations. . Functional progress will determine ongoing rehab dispo recommendations, which may change.

## 2019-09-30 NOTE — OCCUPATIONAL THERAPY INITIAL EVALUATION ADULT - BALANCE TRAINING, PT EVAL
GOAL: Patient will increase static/dynamic sitting/standing balance by 1/2 grade to facilitate increased ability to perform ADLs and functional mobility.

## 2019-09-30 NOTE — CONSULT NOTE ADULT - ASSESSMENT
58 yo RH F w/ PMH of stroke (3 yrs ago (presented w/ right hemiparesis/dysarthria)), HTN, T2DM (w/ ?retinopathy), glaucoma w/ R eye blindness, and hearing loss s/p hearing aids who presented w/ R hand and mouth numbness and R mouth numbness in setting of restricted diffusion in L thalamus w/ other findings of multifocal narrowing of the distal anterior middle and posterior cerebral arteries b/l. Ddx includes vasospasms, infection, reversible cerebral vasoconstriction syndromes, infection, systemic vasculitides such as Polyarteritis Nodosa,     Recs:  - UA and Urine Protein/Cr ratio to assess for proteinuria and possible kidney involvement   - CXR   - Antiphospholipid antibodies (Anti-cardiolipin, beta-2 glycoprotein 1, lupus anticoagulant)   - Quant gold   - RPR, FTA-ABS          Case discussed NOT YET w/ Dr. Rodriguez  Rheum will follow 58 yo RH F w/ PMH of stroke (3 yrs ago (presented w/ right hemiparesis/dysarthria)), HTN, T2DM (w/ ?retinopathy), glaucoma w/ R eye blindness, and hearing loss s/p hearing aids who presented w/ R hand and mouth numbness and R mouth numbness in setting of restricted diffusion in L thalamus w/ other findings of multifocal narrowing of the distal anterior middle and posterior cerebral arteries b/l. Ddx includes vasospasms, infection, reversible cerebral vasoconstriction syndromes, systemic vasculitides.      Recs:  - MRA of Chest and Abdomen to assess for other vessel involvement. If vessels appear normal w/ no narrowing, can consider large vessel (temporal) biopsy to further evaluate for ?vasculitis   - CXR   - UA and Urine Protein/Cr ratio to assess for proteinuria and possible kidney involvement   - Antiphospholipid antibodies (Anti-cardiolipin, beta-2 glycoprotein 1, lupus anticoagulant)   - Quant gold   - RPR, FTA-ABS, Hep B, C, Ag Ab  - MARILOU, dsDNA, ANCA pending    Case discussed w/ Dr. Rodriguez  Rheum will follow 58 yo RH F w/ PMH of stroke (3 yrs ago (presented w/ right hemiparesis/dysarthria)), HTN, T2DM (w/ ?retinopathy), glaucoma w/ R eye blindness, and hearing loss s/p hearing aids who presented w/ R hand and mouth numbness and R mouth numbness in setting of restricted diffusion in L thalamus w/ other findings of multifocal narrowing of the distal anterior middle and posterior cerebral arteries b/l. Ddx includes vasospasms, infection, reversible cerebral vasoconstriction syndromes, systemic vasculitides.      Recs:  - MRA of Chest and Abdomen to assess for other vessel involvement. If vessels appear normal w/ no narrowing, can consider large vessel (temporal) biopsy to further evaluate for ?vasculitis   - CXR   - UA and Urine Protein/Cr ratio to assess for proteinuria and possible kidney involvement   - Antiphospholipid antibodies (Anti-cardiolipin, beta-2 glycoprotein 1, lupus anticoagulant)   - Quant gold   - RPR, FTA-ABS, Hep B, C, Ag Ab  - MARILOU, dsDNA, ANCA pending  - consider PET/CT to evaluate for ongoing inflammation in the abnormal vessels     Case discussed w/ Dr. Rodriguez  Rheum will follow

## 2019-09-30 NOTE — OCCUPATIONAL THERAPY INITIAL EVALUATION ADULT - ADDITIONAL COMMENTS
CT head 9/28: No CT evidence of acute intracranial hemorrhage, extra-axial collection, or mass effect.   MRI head/neck 9/29: Acute infarct left thalamus new since 8/19/2016. Small vessel white matter ischemic changes. Rhiannon there is multifocal narrowing of the distal anterior middle and posterior cerebral arteries bilaterally which are new in the interval. This is suspicious for vasculitis or vasospasm confirmed with conventional angiography.

## 2019-09-30 NOTE — CONSULT NOTE ADULT - SUBJECTIVE AND OBJECTIVE BOX
Cardiovascular Disease Initial Evaluation    CHIEF COMPLAINT: R sided numbness    HISTORY OF PRESENT ILLNESS:  This is a 59 year old woman with HTN, HLD and uncontrolled IDDM (HbA1c- 9%) and prior CVA who presented to [facility] with right hand weakness/numbness and right mouth numbness. Patient had previously taken aspirin, but does not remember why she decided to stop taking it. Ms. Aburto ambulates with a cane due to right sided weakness/states she drifts to the right without a cane for the past 3 years.  She denies chest pain or SOB.       Allergies  No Known Allergies      MEDICATIONS:  aspirin  chewable 81 milliGRAM(s) Oral daily  enoxaparin Injectable 30 milliGRAM(s) SubCutaneous daily  atorvastatin 80 milliGRAM(s) Oral at bedtime  dextrose 40% Gel 15 Gram(s) Oral once PRN  dextrose 50% Injectable 25 Gram(s) IV Push once  dextrose 50% Injectable 25 Gram(s) IV Push once  dextrose 50% Injectable 12.5 Gram(s) IV Push once  glucagon  Injectable 1 milliGRAM(s) IntraMuscular once PRN  insulin lispro (HumaLOG) corrective regimen sliding scale   SubCutaneous three times a day before meals  insulin lispro Injectable (HumaLOG) 3 Unit(s) SubCutaneous three times a day before meals    dextrose 5%. 1000 milliLiter(s) IV Continuous <Continuous>      PAST MEDICAL & SURGICAL HISTORY:  CVA (cerebral vascular accident)  Hypertension  Diabetes mellitus  Diabetes  No significant past surgical history      FAMILY HISTORY:  Family history of cerebrovascular accident (CVA)  No pertinent family history in first degree relatives      SOCIAL HISTORY:    Non-smoker        REVIEW OF SYSTEMS:  See HPI, otherwise complete 10 point review of systems negative      PHYSICAL EXAM:  T(C): 36.9 (09-30-19 @ 07:11), Max: 37.1 (09-29-19 @ 22:21)  HR: 80 (09-30-19 @ 07:11) (64 - 82)  BP: 125/84 (09-30-19 @ 07:11) (119/77 - 170/88)  RR: 20 (09-30-19 @ 07:11) (17 - 20)  SpO2: 96% (09-30-19 @ 07:11) (96% - 100%)  Wt(kg): --  I&O's Summary      Appearance: No Acute Distress	  HEENT:  Normal oral mucosa, PERRL, EOMI	  Cardiovascular: Normal S1 S2, No JVD, No murmurs/rubs/gallops  Respiratory: Normal respiratory effort; Lungs clear to auscultation bilaterally  Gastrointestinal:  Soft, Non-tender, + BS	  Skin: No rashes, No ecchymoses, No cyanosis	  Neurologic: Right sided numbness  Extremities: No clubbing, cyanosis or edema  Vascular: Peripheral pulses palpable 2+ bilaterally  Psychiatry: A & O x 3, Mood & affect appropriate    Laboratory Data:	 	    CBC Full  -  ( 28 Sep 2019 14:02 )  WBC Count : 7.9 K/uL  Hemoglobin : 12.1 g/dL  Hematocrit : 38.4 %  Platelet Count - Automated : 277 K/uL  Mean Cell Volume : 84.7 fl  Mean Cell Hemoglobin : 26.6 pg  Mean Cell Hemoglobin Concentration : 31.4 gm/dL  Auto Neutrophil # : 5.2 K/uL  Auto Lymphocyte # : 2.0 K/uL  Auto Monocyte # : 0.5 K/uL  Auto Eosinophil # : 0.1 K/uL  Auto Basophil # : 0.1 K/uL  Auto Neutrophil % : 66.0 %  Auto Lymphocyte % : 26.0 %  Auto Monocyte % : 6.2 %  Auto Eosinophil % : 1.2 %  Auto Basophil % : 0.7 %    09-30    135  |  110<H>  |  55<H>  ----------------------------<  153<H>  5.5<H>   |  13<L>  |  1.80<H>  09-28    141  |  108  |  48<H>  ----------------------------<  182<H>  5.0   |  21<L>  |  1.74<H>    Ca    10.4      30 Sep 2019 06:30  Ca    10.3      28 Sep 2019 14:02  Phos  3.8     09-28  Mg     1.9     09-28    TPro  7.9  /  Alb  4.1  /  TBili  0.3  /  DBili  x   /  AST  9<L>  /  ALT  6<L>  /  AlkPhos  95  09-28        Interpretation of Telemetry: Sinus	    ECG:  	Sinus; LVH    Assessment: 59 year old woman with HTN, HLD, LVH, uncontrolled IDDM (HbA1c- 9%) and prior CVA presents with acute L CVA    Plan of Care:    #Acute L thalamic CVA-  MRA suggestive of vasculitis.  No evidence of atrial ectopy on telemetry thus far.  Please obtain echocardiogram with bubble study.   Awaiting rheumatology evaluation.  Would hold off on loop recorder unless vasculitis is considered unlikely to be the cause of CVA.     #LVH-  Due to hypertensive disease.  BP acceptable at present.  Awaiting echo.     Care discussed with patient.     62 minutes spent on total encounter; more than 50% of the visit was spent counseling and/or coordinating care by the attending physician.   	  Viet Felix MD [facility]  Cardiovascular Diseases  [phone]

## 2019-09-30 NOTE — CONSULT NOTE ADULT - SUBJECTIVE AND OBJECTIVE BOX
58 yo RHD F with a PMH of stroke 3 years ago with residual right sided weakness, HTN, T2DM (with retinopathy?), glaucoma? with right eye blindness, and hearing loss (has hearing aids) presented to [facility] with right hand weakness/numbness and right mouth numbness. As per patient and son at bedside she was eating when she developed right hand numbness. She denies any fall or trauma. The following day son drove patient to [facility]. NIHSS was 1. CT head was negative for any acute intracranial pathology. MRI head showed an acute infarct of left thalamus. Patient was admitted to the stroke unit for further evaluation. MRA showed suspicion of vasculitis or vasospasm for which cardiology following.   At baseline, patient ambulated with a SAC since her prior CVA three years ago as she drifts to the  right when walking. She does require some assistance with bathing and dressing, provided by her sons. Patient lives in an apartment with her sons in [facility] sons at bedside during evaluation     REVIEW OF SYSTEMS  Constitutional - No fever, No weight loss, + fatigue  HEENT - No eye pain, + visual disturbances, No difficulty hearing, No tinnitus,   Respiratory - No cough, No wheezing, No shortness of breath  Cardiovascular - No chest pain, No palpitations  Gastrointestinal - No abdominal pain, No nausea, No vomiting, No diarrhea, No constipation  Genitourinary - No dysuria, No frequency,  No incontinence  Neurological - No headaches, No memory loss, + loss of strength, + numbness, No tremors  Skin - No itching, No rashes, No lesions   Musculoskeletal - No joint pain, No joint swelling, No muscle pain  Psychiatric - No depression, No anxiety    PAST MEDICAL & SURGICAL HISTORY  CVA (cerebral vascular accident)  Hypertension  Diabetes mellitus  No significant past surgical history      SOCIAL HISTORY  Smoking - Denied  EtOH - Denied   Drugs - Denied    PRIOR FUNCTIONAL HISTORY  Ambulation: independent with SAC  ADLs: requires assistance for dressing, bathing (provided by sons)    Previous Home Equipment: [facility]    HOME ENVIRONMENT:  Type of Home: 11th floor apartment  Stairs: 0 outside / elevator  Living With: adult sons  Caregiver's availability: yes     Special Needs or Precautions:  Weight bearing status: WBAT    FAMILY HISTORY   Family history of cerebrovascular accident (CVA)      RECENT LABS/IMAGING  CBC Full  -  ( 30 Sep 2019 08:14 )  WBC Count : 6.15 K/uL  RBC Count : 4.76 M/uL  Hemoglobin : 12.2 g/dL  Hematocrit : 41.6 %  Platelet Count - Automated : 244 K/uL  Mean Cell Volume : 87.4 fl  Mean Cell Hemoglobin : 25.6 pg  Mean Cell Hemoglobin Concentration : 29.3 gm/dL  Auto Neutrophil # : x  Auto Lymphocyte # : x  Auto Monocyte # : x  Auto Eosinophil # : x  Auto Basophil # : x  Auto Neutrophil % : x  Auto Lymphocyte % : x  Auto Monocyte % : x  Auto Eosinophil % : x  Auto Basophil % : x    09-30    134<L>  |  104  |  60<H>  ----------------------------<  150<H>  5.1   |  20<L>  |  1.93<H>    Ca    10.3      30 Sep 2019 12:03  Phos  3.8     09-28  Mg     1.9     09-28    TPro  7.9  /  Alb  4.1  /  TBili  0.3  /  DBili  x   /  AST  9<L>  /  ALT  6<L>  /  AlkPhos  95  09-28    IMAGING    < from: CT Head No Cont (09.28.19 @ 14:13) >  No CT evidence of acute intracranial hemorrhage, extra-axial collection, or mass effect.     < from: MR Head / MRA Head & Neck No Cont (09.29.19 @ 09:40) >  Acute infarct left thalamus new since 8/19/2016. Small vessel white matter ischemic changes. Rhiannno there is multifocal narrowing of the distal anterior middle and posterior cerebral arteries bilaterally which are new in the interval. This is suspicious for vasculitis or vasospasm   confirmed with conventional angiography.    VITALS  T(C): 36.9 (09-30-19 @ 07:11), Max: 37.1 (09-29-19 @ 22:21)  HR: 96 (09-30-19 @ 09:32) (64 - 96)  BP: 167/91 (09-30-19 @ 09:32) (119/77 - 170/88)  RR: 20 (09-30-19 @ 07:11) (17 - 20)  SpO2: 100% (09-30-19 @ 09:32) (96% - 100%)  Wt(kg): --    ALLERGIES  No Known Allergies      MEDICATIONS   aspirin  chewable 81 milliGRAM(s) Oral daily  atorvastatin 80 milliGRAM(s) Oral at bedtime  dextrose 40% Gel 15 Gram(s) Oral once PRN  dextrose 5%. 1000 milliLiter(s) IV Continuous <Continuous>  dextrose 50% Injectable 25 Gram(s) IV Push once  dextrose 50% Injectable 25 Gram(s) IV Push once  dextrose 50% Injectable 12.5 Gram(s) IV Push once  enoxaparin Injectable 30 milliGRAM(s) SubCutaneous daily  glucagon  Injectable 1 milliGRAM(s) IntraMuscular once PRN  insulin lispro (HumaLOG) corrective regimen sliding scale   SubCutaneous three times a day before meals  insulin lispro Injectable (HumaLOG) 3 Unit(s) SubCutaneous three times a day before meals      ----------------------------------------------------------------------------------------  PHYSICAL EXAM  Constitutional - NAD, Comfortable, sitting in bed  HEENT - NCAT, EOMI  Neck - Supple, No limited ROM  Chest - CTA bilaterally, No wheezing  Cardiovascular - RRR, S1S2,   Abdomen - BS+, Soft, NT ND  Extremities - No C/C/E, No calf tenderness   Neurologic Exam -                    Cognitive - AAOx3, NAD, follows commands approprietly     Communication - Fluent, + dysarthria     Cranial Nerves - facial sensation intact, tongue midline, no ptosis, no facial droop, shoulder shrug intact     Motor - poor patient effort                    LEFT    UE - ShAB 4/5, EF 4/5, EE 4/5, WE 4/5,  4/5                    RIGHT UE - ShAB 4/5, EF 4/5, EE 4/5, WE 4/5,  4/5                    LEFT    LE - HF 4/5, KE 5/5, DF 5/5, PF 5/5                    RIGHT LE - HF 4/5, KE 5/5, DF 5/5, PF 5/5        Sensory - decreased on left UE and left LE to light touch compared to right side     Reflexes - DTR Intact and equal bilateral     Coordination - FTN intact bilaterally     Rapid alternating movements intact bilaterally      OculoVestibular - No saccades, No nystagmus,   Psychiatric - Mood stable, Affect WNL    CURRENT FUNCTIONAL STATUS  PT 9/30  Transfer: Sit to Stand:     · Level of [place]	minimum assist (75% patients effort)	  · Physical Assist/Nonphysical Assist	verbal cues; supervision; 1 person assist	  · Weight-Bearing Restrictions	full weight-bearing	  · Assistive Device	straight cane	    Transfer: Stand to Sit:     · Level of [place]	minimum assist (75% patients effort)	  · Physical Assist/Nonphysical Assist	supervision; verbal cues; 1 person assist	  · Weight-Bearing Restrictions	full weight-bearing	    Sit/Stand Transfer Safety Analysis:     · Impairments Contributing to Impaired Transfers	impaired balance; decreased flexibility; decreased strength	    Gait Skills:     · Level of [place]	minimum assist (75% patients effort)	  · Physical Assist/Nonphysical Assist	supervision; verbal cues; 1 person assist	  · Weight-Bearing Restrictions	full weight-bearing	  · Assistive Device	rolling walker	  · Gait Distance	35 ft; 5ft with cane and min-mod Ax1.	    Gait Analysis:     · Gait Pattern Used	swing-through gait	  · Gait Deviations Noted	decreased letitia; decreased step length; decreased stride length	  · Impairments Contributing to Gait Deviations	impaired balance; decreased flexibility; decreased strength; narrow base of support	    Balance Skills Assessment:     · Sitting Balance: Static	fair balance	  · Sitting Balance: Dynamic	fair minus	  · Sit-to-Stand Balance	fair balance	  · Standing Balance: Static	fair balance	  · Standing Balance: Dynamic	fair minus	  · Systems Impairment Contributing to Balance Disturbance	musculoskeletal	  · Identified Impairments Contributing to Balance Disturbance	decreased strength	    Sensory Examination:    Sensory Examination:    Grossly Intact:   · Gross Sensory Examination	Grossly Intact	      Light Touch Sensation:   · Left UE	within normal limits	  · Right UE	within normal limits	  · Left LE	within normal limits	  · Right LE	within normal limits	    · Coordination Assessed	finger to nose	      Proprioception:   · Coordination Assessed	finger to nose	      Fine Motor Coordination:    Fine Motor Coordination Examination:    Fine Motor Coordination:   · Left Hand, Finger to Nose	normal performance	  · Right Hand, Finger to Nose	normal performance	  · Left Hand Thumb/Finger Opposition Skills	normal performance	  · Right Hand Thumb/Finger Opposition Skills	normal performance	    OT 9/30    Visual Assessment:    Visual Assessment:    Visual Assessment:   · Visual Acuity	not tested; Pt wears glasses	  · Visual Tracking	normal	  · Visual Neglect	none	  · Visual Field Cuts	right; Pt blind in R eye	  · Eye Muscle Balance	normal	  · Visual Scanning	WFL	  · Visual Convergence	normal	  · Visual Depth Perception	WFL	    Fine Motor Coordination:     Fine Motor Coordination:   · Left Hand, Finger to Nose	mild impairment  Increased time	  · Right Hand, Finger to Nose	mild impairment  increased time	  · Left Hand Thumb/Finger Opposition Skills	mild impairment  increased time	  · Right Hand Thumb/Finger Opposition Skills	mild impairment  increased time	    Lower Body Dressing Training:     · Level of [place]	contact guard	  · Physical Assist/Nonphysical Assist	1 person assist; verbal cues	    Toilet Hygiene Training:     · Level of [place]	independent	    Grooming Training:     · Level of [place]	contact guard; Washed hands standing at sink	  · Physical Assist/Nonphysical Assist	verbal cues; 1 person assist	    ----------------------------------------------------------------------------------------  ASSESSMENT/PLAN    59y Female with functional deficits after acute left thalamic infarct    Pain - Tylenol  DVT PPX - SCDs  Rehab -   Continue bedside therapy as well as OOB throughout the day with mobilization by staff to maintain cardiopulmonary function and prevention of secondary complications related to debility.      Recommend ACUTE inpatient rehabilitation for the functional deficits consisting of 3 hours of therapy/day & 24 hour RN/daily PMR physician for comorbid medical management. Patient will be able to tolerate 3 hours a day.   Recommend CLAIR, patient DOES NOT meet acute inpatient rehabilitation criteria   Expect patient to achieve functional goals for DC HOME with OUTPATIENT   Expect patient to achieve functional goals for DC HOME with HOME CARE   Follow up with CONCUSSION PROGRAM - Call [phone] for an appointment  Will sign off, please reconsult if needed for rehab dispo recommendations.     Will continue to follow for ongoing rehab needs and recommendations. . Functional progress will determine ongoing rehab dispo recommendations, which may change. 60 yo RHD F with a PMH of stroke 3 years ago with residual right sided weakness, HTN, T2DM (with retinopathy?), glaucoma? with right eye blindness, and hearing loss (has hearing aids) presented to [facility] with right hand weakness/numbness and right mouth numbness. As per patient and son at bedside she was eating when she developed right hand numbness. She denies any fall or trauma. The following day son drove patient to [facility]. NIHSS was 1. CT head was negative for any acute intracranial pathology. MRI head showed an acute infarct of left thalamus. Patient was admitted to the stroke unit for further evaluation. MRA showed suspicion of vasculitis or vasospasm for which cardiology following.   At baseline, patient ambulated with a SAC since her prior CVA three years ago as she drifts to the  right when walking. She does require some assistance with bathing and dressing, provided by her sons. Patient lives in an apartment with her sons in [facility] sons at bedside during evaluation     REVIEW OF SYSTEMS  Constitutional - No fever, No weight loss, + fatigue  HEENT - No eye pain, + visual disturbances, No difficulty hearing, No tinnitus,   Respiratory - No cough, No wheezing, No shortness of breath  Cardiovascular - No chest pain, No palpitations  Gastrointestinal - No abdominal pain, No nausea, No vomiting, No diarrhea, No constipation  Genitourinary - No dysuria, No frequency,  No incontinence  Neurological - No headaches, No memory loss, + loss of strength, + numbness, No tremors  Skin - No itching, No rashes, No lesions   Musculoskeletal - No joint pain, No joint swelling, No muscle pain  Psychiatric - No depression, No anxiety    PAST MEDICAL & SURGICAL HISTORY  CVA (cerebral vascular accident)  Hypertension  Diabetes mellitus  No significant past surgical history      SOCIAL HISTORY  Smoking - Denied  EtOH - Denied   Drugs - Denied    PRIOR FUNCTIONAL HISTORY  Ambulation: independent with SAC  ADLs: requires assistance for dressing, bathing (provided by sons)    Previous Home Equipment: [facility]    HOME ENVIRONMENT:  Type of Home: 11th floor apartment  Stairs: 0 outside / elevator  Living With: adult sons  Caregiver's availability: yes     Special Needs or Precautions:  Weight bearing status: WBAT    FAMILY HISTORY   Family history of cerebrovascular accident (CVA)      RECENT LABS/IMAGING  CBC Full  -  ( 30 Sep 2019 08:14 )  WBC Count : 6.15 K/uL  RBC Count : 4.76 M/uL  Hemoglobin : 12.2 g/dL  Hematocrit : 41.6 %  Platelet Count - Automated : 244 K/uL  Mean Cell Volume : 87.4 fl  Mean Cell Hemoglobin : 25.6 pg  Mean Cell Hemoglobin Concentration : 29.3 gm/dL  Auto Neutrophil # : x  Auto Lymphocyte # : x  Auto Monocyte # : x  Auto Eosinophil # : x  Auto Basophil # : x  Auto Neutrophil % : x  Auto Lymphocyte % : x  Auto Monocyte % : x  Auto Eosinophil % : x  Auto Basophil % : x    09-30    134<L>  |  104  |  60<H>  ----------------------------<  150<H>  5.1   |  20<L>  |  1.93<H>    Ca    10.3      30 Sep 2019 12:03  Phos  3.8     09-28  Mg     1.9     09-28    TPro  7.9  /  Alb  4.1  /  TBili  0.3  /  DBili  x   /  AST  9<L>  /  ALT  6<L>  /  AlkPhos  95  09-28    IMAGING    < from: CT Head No Cont (09.28.19 @ 14:13) >  No CT evidence of acute intracranial hemorrhage, extra-axial collection, or mass effect.     < from: MR Head / MRA Head & Neck No Cont (09.29.19 @ 09:40) >  Acute infarct left thalamus new since 8/19/2016. Small vessel white matter ischemic changes. Rhiannon there is multifocal narrowing of the distal anterior middle and posterior cerebral arteries bilaterally which are new in the interval. This is suspicious for vasculitis or vasospasm   confirmed with conventional angiography.    VITALS  T(C): 36.9 (09-30-19 @ 07:11), Max: 37.1 (09-29-19 @ 22:21)  HR: 96 (09-30-19 @ 09:32) (64 - 96)  BP: 167/91 (09-30-19 @ 09:32) (119/77 - 170/88)  RR: 20 (09-30-19 @ 07:11) (17 - 20)  SpO2: 100% (09-30-19 @ 09:32) (96% - 100%)  Wt(kg): --    ALLERGIES  No Known Allergies      MEDICATIONS   aspirin  chewable 81 milliGRAM(s) Oral daily  atorvastatin 80 milliGRAM(s) Oral at bedtime  dextrose 40% Gel 15 Gram(s) Oral once PRN  dextrose 5%. 1000 milliLiter(s) IV Continuous <Continuous>  dextrose 50% Injectable 25 Gram(s) IV Push once  dextrose 50% Injectable 25 Gram(s) IV Push once  dextrose 50% Injectable 12.5 Gram(s) IV Push once  enoxaparin Injectable 30 milliGRAM(s) SubCutaneous daily  glucagon  Injectable 1 milliGRAM(s) IntraMuscular once PRN  insulin lispro (HumaLOG) corrective regimen sliding scale   SubCutaneous three times a day before meals  insulin lispro Injectable (HumaLOG) 3 Unit(s) SubCutaneous three times a day before meals      ----------------------------------------------------------------------------------------  PHYSICAL EXAM  Constitutional - NAD, Comfortable, sitting in bed  HEENT - NCAT, EOMI  Neck - Supple, No limited ROM  Chest - CTA bilaterally, No wheezing  Cardiovascular - RRR, S1S2,   Abdomen - BS+, Soft, NT ND  Extremities - No C/C/E, No calf tenderness   Neurologic Exam -                    Cognitive - AAOx3, NAD, follows commands approprietly     Communication - Fluent, + dysarthria     Cranial Nerves - decreased facial sensation on right, tongue midline, no ptosis, no facial droop, shoulder shrug intact     Motor - poor patient effort                    LEFT    UE - ShAB 4/5, EF 4/5, EE 4/5, WE 4/5,  4/5                    RIGHT UE - ShAB 4/5, EF 4/5, EE 4/5, WE 4/5,  4/5                    LEFT    LE - HF 4/5, KE 5/5, DF 5/5, PF 5/5                    RIGHT LE - HF 4/5, KE 5/5, DF 5/5, PF 5/5        Sensory - decreased on right side     Reflexes - DTR Intact and equal bilateral     Coordination - FTN intact bilaterally     Rapid alternating movements intact bilaterally      OculoVestibular - No saccades, No nystagmus,   Psychiatric - Mood stable, Affect WNL    CURRENT FUNCTIONAL STATUS  PT 9/30  Transfer: Sit to Stand:     · Level of [place]	minimum assist (75% patients effort)	  · Physical Assist/Nonphysical Assist	verbal cues; supervision; 1 person assist	  · Weight-Bearing Restrictions	full weight-bearing	  · Assistive Device	straight cane	    Transfer: Stand to Sit:     · Level of [place]	minimum assist (75% patients effort)	  · Physical Assist/Nonphysical Assist	supervision; verbal cues; 1 person assist	  · Weight-Bearing Restrictions	full weight-bearing	    Sit/Stand Transfer Safety Analysis:     · Impairments Contributing to Impaired Transfers	impaired balance; decreased flexibility; decreased strength	    Gait Skills:     · Level of [place]	minimum assist (75% patients effort)	  · Physical Assist/Nonphysical Assist	supervision; verbal cues; 1 person assist	  · Weight-Bearing Restrictions	full weight-bearing	  · Assistive Device	rolling walker	  · Gait Distance	35 ft; 5ft with cane and min-mod Ax1.	    Gait Analysis:     · Gait Pattern Used	swing-through gait	  · Gait Deviations Noted	decreased letitia; decreased step length; decreased stride length	  · Impairments Contributing to Gait Deviations	impaired balance; decreased flexibility; decreased strength; narrow base of support	    Balance Skills Assessment:     · Sitting Balance: Static	fair balance	  · Sitting Balance: Dynamic	fair minus	  · Sit-to-Stand Balance	fair balance	  · Standing Balance: Static	fair balance	  · Standing Balance: Dynamic	fair minus	  · Systems Impairment Contributing to Balance Disturbance	musculoskeletal	  · Identified Impairments Contributing to Balance Disturbance	decreased strength	    Sensory Examination:    Sensory Examination:    Grossly Intact:   · Gross Sensory Examination	Grossly Intact	      Light Touch Sensation:   · Left UE	within normal limits	  · Right UE	within normal limits	  · Left LE	within normal limits	  · Right LE	within normal limits	    · Coordination Assessed	finger to nose	      Proprioception:   · Coordination Assessed	finger to nose	      Fine Motor Coordination:    Fine Motor Coordination Examination:    Fine Motor Coordination:   · Left Hand, Finger to Nose	normal performance	  · Right Hand, Finger to Nose	normal performance	  · Left Hand Thumb/Finger Opposition Skills	normal performance	  · Right Hand Thumb/Finger Opposition Skills	normal performance	    OT 9/30    Visual Assessment:    Visual Assessment:    Visual Assessment:   · Visual Acuity	not tested; Pt wears glasses	  · Visual Tracking	normal	  · Visual Neglect	none	  · Visual Field Cuts	right; Pt blind in R eye	  · Eye Muscle Balance	normal	  · Visual Scanning	WFL	  · Visual Convergence	normal	  · Visual Depth Perception	WFL	    Fine Motor Coordination:     Fine Motor Coordination:   · Left Hand, Finger to Nose	mild impairment  Increased time	  · Right Hand, Finger to Nose	mild impairment  increased time	  · Left Hand Thumb/Finger Opposition Skills	mild impairment  increased time	  · Right Hand Thumb/Finger Opposition Skills	mild impairment  increased time	    Lower Body Dressing Training:     · Level of [place]	contact guard	  · Physical Assist/Nonphysical Assist	1 person assist; verbal cues	    Toilet Hygiene Training:     · Level of [place]	independent	    Grooming Training:     · Level of [place]	contact guard; Washed hands standing at sink	  · Physical Assist/Nonphysical Assist	verbal cues; 1 person assist	    ----------------------------------------------------------------------------------------  ASSESSMENT/PLAN    59y Female with functional deficits after acute left thalamic infarct    Acute left thalamic infarct - management as per neurology, aspirin  DM2 - insulin, sliding scale  HLD - atorvastatin  Pain - Tylenol  DVT PPX - SCDs, Lovenox  Diet - Regular  Rehab -   Continue bedside therapy as well as OOB throughout the day with mobilization by staff to maintain cardiopulmonary function and prevention of secondary complications related to debility.   PT- ROM, Bed Mobility, transfers, amb w AD, GCEs  OT- ADLs, energy conservation  SLP- Dysphagia eval and tx    Recommend ACUTE inpatient rehabilitation for the functional deficits consisting of 3 hours of therapy/day & 24 hour RN/daily PMR physician for comorbid medical management. Patient will be able to tolerate 3 hours a day.    Will continue to follow for ongoing rehab needs and recommendations. . Functional progress will determine ongoing rehab dispo recommendations, which may change. 58 yo RHD F with a PMH of stroke 3 years ago with residual right sided weakness, HTN, T2DM (with retinopathy?), glaucoma? with right eye blindness, and hearing loss (has hearing aids) presented to [facility] with right hand weakness/numbness and right mouth numbness. As per patient and son at bedside she was eating when she developed right hand numbness. She denies any fall or trauma. The following day son drove patient to [facility]. NIHSS was 1. CT head was negative for any acute intracranial pathology. MRI head showed an acute infarct of left thalamus. Patient was admitted to the stroke unit for further evaluation. MRA showed suspicion of vasculitis or vasospasm for which cardiology following.   At baseline, patient ambulated with a SAC since her prior CVA three years ago as she drifts to the  right when walking. She does require some assistance with bathing and dressing, provided by her sons. Patient lives in an apartment with her sons in [facility] sons at bedside during evaluation     REVIEW OF SYSTEMS  Constitutional - No fever, No weight loss, + fatigue  HEENT - No eye pain, + visual disturbances, No difficulty hearing, No tinnitus,   Respiratory - No cough, No wheezing, No shortness of breath  Cardiovascular - No chest pain, No palpitations  Gastrointestinal - No abdominal pain, No nausea, No vomiting, No diarrhea, No constipation  Genitourinary - No dysuria, No frequency,  No incontinence  Neurological - No headaches, No memory loss, + loss of strength, + numbness, No tremors  Skin - No itching, No rashes, No lesions   Musculoskeletal - No joint pain, No joint swelling, No muscle pain  Psychiatric - No depression, No anxiety    PAST MEDICAL & SURGICAL HISTORY  CVA (cerebral vascular accident)  Hypertension  Diabetes mellitus  No significant past surgical history      SOCIAL HISTORY  Smoking - Denied  EtOH - Denied   Drugs - Denied    PRIOR FUNCTIONAL HISTORY  Ambulation: independent with SAC  ADLs: requires assistance for dressing, bathing (provided by sons)    Previous Home Equipment: [facility]    HOME ENVIRONMENT:  Type of Home: 11th floor apartment  Stairs: 0 outside / elevator  Living With: adult sons  Caregiver's availability: yes     Special Needs or Precautions:  Weight bearing status: WBAT    FAMILY HISTORY   Family history of cerebrovascular accident (CVA)      RECENT LABS/IMAGING  CBC Full  -  ( 30 Sep 2019 08:14 )  WBC Count : 6.15 K/uL  RBC Count : 4.76 M/uL  Hemoglobin : 12.2 g/dL  Hematocrit : 41.6 %  Platelet Count - Automated : 244 K/uL  Mean Cell Volume : 87.4 fl  Mean Cell Hemoglobin : 25.6 pg  Mean Cell Hemoglobin Concentration : 29.3 gm/dL  Auto Neutrophil # : x  Auto Lymphocyte # : x  Auto Monocyte # : x  Auto Eosinophil # : x  Auto Basophil # : x  Auto Neutrophil % : x  Auto Lymphocyte % : x  Auto Monocyte % : x  Auto Eosinophil % : x  Auto Basophil % : x    09-30    134<L>  |  104  |  60<H>  ----------------------------<  150<H>  5.1   |  20<L>  |  1.93<H>    Ca    10.3      30 Sep 2019 12:03  Phos  3.8     09-28  Mg     1.9     09-28    TPro  7.9  /  Alb  4.1  /  TBili  0.3  /  DBili  x   /  AST  9<L>  /  ALT  6<L>  /  AlkPhos  95  09-28    IMAGING    < from: CT Head No Cont (09.28.19 @ 14:13) >  No CT evidence of acute intracranial hemorrhage, extra-axial collection, or mass effect.     < from: MR Head / MRA Head & Neck No Cont (09.29.19 @ 09:40) >  Acute infarct left thalamus new since 8/19/2016. Small vessel white matter ischemic changes. Rhiannon there is multifocal narrowing of the distal anterior middle and posterior cerebral arteries bilaterally which are new in the interval. This is suspicious for vasculitis or vasospasm   confirmed with conventional angiography.    VITALS  T(C): 36.9 (09-30-19 @ 07:11), Max: 37.1 (09-29-19 @ 22:21)  HR: 96 (09-30-19 @ 09:32) (64 - 96)  BP: 167/91 (09-30-19 @ 09:32) (119/77 - 170/88)  RR: 20 (09-30-19 @ 07:11) (17 - 20)  SpO2: 100% (09-30-19 @ 09:32) (96% - 100%)  Wt(kg): --    ALLERGIES  No Known Allergies      MEDICATIONS   aspirin  chewable 81 milliGRAM(s) Oral daily  atorvastatin 80 milliGRAM(s) Oral at bedtime  dextrose 40% Gel 15 Gram(s) Oral once PRN  dextrose 5%. 1000 milliLiter(s) IV Continuous <Continuous>  dextrose 50% Injectable 25 Gram(s) IV Push once  dextrose 50% Injectable 25 Gram(s) IV Push once  dextrose 50% Injectable 12.5 Gram(s) IV Push once  enoxaparin Injectable 30 milliGRAM(s) SubCutaneous daily  glucagon  Injectable 1 milliGRAM(s) IntraMuscular once PRN  insulin lispro (HumaLOG) corrective regimen sliding scale   SubCutaneous three times a day before meals  insulin lispro Injectable (HumaLOG) 3 Unit(s) SubCutaneous three times a day before meals      ----------------------------------------------------------------------------------------  PHYSICAL EXAM  Constitutional - NAD, Comfortable, sitting in bed  HEENT - NCAT, EOMI  Neck - Supple, No limited ROM  Chest - CTA bilaterally, No wheezing  Cardiovascular - RRR, S1S2,   Abdomen - BS+, Soft, NT ND  Extremities - No C/C/E, No calf tenderness   Neurologic Exam -                    Cognitive - AAOx3, NAD, follows commands approprietly     Communication - Fluent, + dysarthria     Cranial Nerves - decreased facial sensation on right, tongue midline, no ptosis, no facial droop, shoulder shrug intact     Motor - poor patient effort                    LEFT    UE - ShAB 4/5, EF 4/5, EE 4/5, WE 4/5,  4/5                    RIGHT UE - ShAB 4/5, EF 4/5, EE 4/5, WE 4/5,  4/5                    LEFT    LE - HF 4/5, KE 5/5, DF 5/5, PF 5/5                    RIGHT LE - HF 4/5, KE 5/5, DF 5/5, PF 5/5        Sensory - decreased on right side     Reflexes - DTR Intact and equal bilateral     Coordination - FTN intact bilaterally     Rapid alternating movements intact bilaterally      OculoVestibular - No saccades, No nystagmus,   Psychiatric - Mood stable, Affect WNL    CURRENT FUNCTIONAL STATUS  PT 9/30  Transfer: Sit to Stand:     · Level of [place]	minimum assist (75% patients effort)	  · Physical Assist/Nonphysical Assist	verbal cues; supervision; 1 person assist	  · Weight-Bearing Restrictions	full weight-bearing	  · Assistive Device	straight cane	    Transfer: Stand to Sit:     · Level of [place]	minimum assist (75% patients effort)	  · Physical Assist/Nonphysical Assist	supervision; verbal cues; 1 person assist	  · Weight-Bearing Restrictions	full weight-bearing	    Sit/Stand Transfer Safety Analysis:     · Impairments Contributing to Impaired Transfers	impaired balance; decreased flexibility; decreased strength	    Gait Skills:     · Level of [place]	minimum assist (75% patients effort)	  · Physical Assist/Nonphysical Assist	supervision; verbal cues; 1 person assist	  · Weight-Bearing Restrictions	full weight-bearing	  · Assistive Device	rolling walker	  · Gait Distance	35 ft; 5ft with cane and min-mod Ax1.	    Gait Analysis:     · Gait Pattern Used	swing-through gait	  · Gait Deviations Noted	decreased letitia; decreased step length; decreased stride length	  · Impairments Contributing to Gait Deviations	impaired balance; decreased flexibility; decreased strength; narrow base of support	    Balance Skills Assessment:     · Sitting Balance: Static	fair balance	  · Sitting Balance: Dynamic	fair minus	  · Sit-to-Stand Balance	fair balance	  · Standing Balance: Static	fair balance	  · Standing Balance: Dynamic	fair minus	  · Systems Impairment Contributing to Balance Disturbance	musculoskeletal	  · Identified Impairments Contributing to Balance Disturbance	decreased strength	    Sensory Examination:    Sensory Examination:    Grossly Intact:   · Gross Sensory Examination	Grossly Intact	      Light Touch Sensation:   · Left UE	within normal limits	  · Right UE	within normal limits	  · Left LE	within normal limits	  · Right LE	within normal limits	    · Coordination Assessed	finger to nose	      Proprioception:   · Coordination Assessed	finger to nose	      Fine Motor Coordination:    Fine Motor Coordination Examination:    Fine Motor Coordination:   · Left Hand, Finger to Nose	normal performance	  · Right Hand, Finger to Nose	normal performance	  · Left Hand Thumb/Finger Opposition Skills	normal performance	  · Right Hand Thumb/Finger Opposition Skills	normal performance	    OT 9/30    Visual Assessment:    Visual Assessment:    Visual Assessment:   · Visual Acuity	not tested; Pt wears glasses	  · Visual Tracking	normal	  · Visual Neglect	none	  · Visual Field Cuts	right; Pt blind in R eye	  · Eye Muscle Balance	normal	  · Visual Scanning	WFL	  · Visual Convergence	normal	  · Visual Depth Perception	WFL	    Fine Motor Coordination:     Fine Motor Coordination:   · Left Hand, Finger to Nose	mild impairment  Increased time	  · Right Hand, Finger to Nose	mild impairment  increased time	  · Left Hand Thumb/Finger Opposition Skills	mild impairment  increased time	  · Right Hand Thumb/Finger Opposition Skills	mild impairment  increased time	    Lower Body Dressing Training:     · Level of [place]	contact guard	  · Physical Assist/Nonphysical Assist	1 person assist; verbal cues	    Toilet Hygiene Training:     · Level of [place]	independent	    Grooming Training:     · Level of [place]	contact guard; Washed hands standing at sink	  · Physical Assist/Nonphysical Assist	verbal cues; 1 person assist	    ----------------------------------------------------------------------------------------

## 2019-09-30 NOTE — PROGRESS NOTE ADULT - ASSESSMENT
ASSESSMENT: 59 year old RH F with a PMH of stroke? 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 LKN was 18:30 on 9/27.  Brain MRI revealed restricted diffusion at the left thalamus. Head and neck MRA with areas of significant constriction follow by dilation (beads in a string pattern) at  the anterior and posterior cerebral arteries concerning for vasculitis.    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     NEURO: neurologically without acute change, continue close monitoring for neurologic deterioration, permissive HTN followed by gradual normotension as tolerated, titrate statin to LDL goal less than 70, MR imaging as noted. Physical therapy/OT eval.     ANTITHROMBOTIC THERAPY: ASA    PULMONARY:  protecting airway, saturating well     CARDIOVASCULAR: check TTE, cardiac monitoring                              SBP goal:     GASTROINTESTINAL:  dysphagia screen passed, tolerating diet      Diet: regular     RENAL: BUN/Cr elevated, CKD stage 3b, good urine output , mild keyperkalemia- repeat pending, nephrology consult for further evaluation      Na Goal: Greater than 135     Andrade: n    HEMATOLOGY: H/H without anemia, Platelets 277     DVT ppx: Heparin s.c [] LMWH [x]     ID: afebrile, no leukocytosis     OTHER:     DISPOSITION: Rehab or home depending on PT eval once stable and workup is complete      CORE MEASURES:        Admission NIHSS: 1     TPA: [] YES [x] NO      LDL/HDL: 211/49     Depression Screen:      Statin Therapy: y      Dysphagia Screen: [x] PASS [] FAIL     Smoking [] YES [] NO      Afib [] YES [x] NO     Stroke Education [x] YES [] NO    Obtain screening lower extremity venous ultrasound in patients who meet 1 or more of the following criteria as patient is high risk for DVT/PE on admission:   [] History of DVT/PE  []Hypercoagulable states (Factor V Leiden, Cancer, OCP, etc. )  []Prolonged immobility (hemiplegia/hemiparesis/post operative or any other extended immobilization)  [] Transferred from outside facility (Rehab or Long term care)  [] Age </= to 50 ASSESSMENT: 59 year old RH F with a PMH of stroke? 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 LKN was 18:30 on 9/27.  Brain MRI revealed restricted diffusion at the left thalamus. Head and neck MRA with areas of significant constriction follow by dilation (beads in a string pattern) at  the anterior and posterior cerebral arteries concerning for vasculitis.    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     NEURO: neurologically without acute change, continue close monitoring for neurologic deterioration, permissive HTN followed by gradual normotension as tolerated, titrate statin to LDL goal less than 70, MR imaging as noted. Physical therapy/OT eval.     ANTITHROMBOTIC THERAPY: ASA    PULMONARY:  protecting airway, saturating well     CARDIOVASCULAR: check TTE, cardiac monitoring                              SBP goal:     GASTROINTESTINAL:  dysphagia screen passed, tolerating diet      Diet: regular     RENAL: BUN/Cr elevated, CKD stage 3b, good urine output , mild keyperkalemia- repeat pending, nephrology consult for further evaluation      Na Goal: Greater than 135     Andrade: n    HEMATOLOGY: H/H without anemia, Platelets 277     DVT ppx: Heparin s.c [] LMWH [x]     ID: afebrile, no leukocytosis     OTHER: Rheumatology consult to evaluate for vasculitis     DISPOSITION: Rehab or home depending on PT eval once stable and workup is complete      CORE MEASURES:        Admission NIHSS: 1     TPA: [] YES [x] NO      LDL/HDL: 211/49     Depression Screen:      Statin Therapy: y      Dysphagia Screen: [x] PASS [] FAIL     Smoking [] YES [] NO      Afib [] YES [x] NO     Stroke Education [x] YES [] NO    Obtain screening lower extremity venous ultrasound in patients who meet 1 or more of the following criteria as patient is high risk for DVT/PE on admission:   [] History of DVT/PE  []Hypercoagulable states (Factor V Leiden, Cancer, OCP, etc. )  []Prolonged immobility (hemiplegia/hemiparesis/post operative or any other extended immobilization)  [] Transferred from outside facility (Rehab or Long term care)  [] Age </= to 50 ASSESSMENT: 59 year old RH F with a PMH of stroke? 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 LKN was 18:30 on 9/27.  Brain MRI revealed restricted diffusion at the left thalamus. Head and neck MRA with areas of significant constriction follow by dilation (beads in a string pattern) at  the anterior and posterior cerebral arteries concerning for vasculitis.    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     NEURO: neurologically without acute change, continue close monitoring for neurologic deterioration, permissive HTN followed by gradual normotension as tolerated, titrate statin to LDL goal less than 70, MR imaging as noted. Physical therapy/OT recommend AR.     ANTITHROMBOTIC THERAPY: ASA    PULMONARY:  protecting airway, saturating well on room air.     CARDIOVASCULAR: check TTE, cardiac monitoring without any recorded events.                              SBP goal: Permissive HTN to gradual normotension.     GASTROINTESTINAL:  dysphagia screen passed, tolerating diet      Diet: regular     RENAL: BUN/Cr elevated, CKD stage 3b, good urine output , mild hyperkalemia- repeat pending, nephrology consult for further evaluation      Na Goal: Greater than 135     Andrade: n    HEMATOLOGY: H/H without anemia, Platelets 277     DVT ppx: Heparin s.c [] LMWH [x]     ID: afebrile, no leukocytosis , no signs or symptoms of infection.     OTHER: Rheumatology consult to evaluate for vasculitis , labs pending. Spoke with pts son and grandson at bedside, all questions addressed.     DISPOSITION: Acute Rehab once stable and workup is complete      CORE MEASURES:        Admission NIHSS: 1     TPA: [] YES [x] NO      LDL/HDL: 211/49     Depression Screen:      Statin Therapy: y      Dysphagia Screen: [x] PASS [] FAIL     Smoking [] YES [] NO      Afib [] YES [x] NO     Stroke Education [x] YES [] NO    Obtain screening lower extremity venous ultrasound in patients who meet 1 or more of the following criteria as patient is high risk for DVT/PE on admission:   [] History of DVT/PE  []Hypercoagulable states (Factor V Leiden, Cancer, OCP, etc. )  []Prolonged immobility (hemiplegia/hemiparesis/post operative or any other extended immobilization)  [] Transferred from outside facility (Rehab or Long term care)  [] Age </= to 50

## 2019-09-30 NOTE — CONSULT NOTE ADULT - SUBJECTIVE AND OBJECTIVE BOX
Patient is a 59y old  Female who presents with a chief complaint of Stroke (30 Sep 2019 12:48)    HPI:  59 year old RH F with a PMH of stroke? 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 LKN was 18:30 on 9/27.  Patient had previously taken aspirin, but does not remember why she decided to stop taking it. Patient does not have a neurologist. Patient ambulates with a cane due to right sided weakness/states she drifts to the right without a cane for the past 3 years. Patient states that she gets physical therapy twice per week.     NIHSS: 1 (right hand numbness)  MRS: 2 (29 Sep 2019 15:57)    Additional Information:    Pt w/ no prior personal or family hx of rheumatological disorders. Denies any other neurological deficits except what is stated above. No HA, changes in vision, changes in mental status. No joint tenderness, edema, or erythema. No hx of dry mouth or dry eyes. Denies any rashes on skin except for one localized pruritic 5cm rash over L shoulder. Denies any urinary or GI symptoms.    REVIEW OF SYSTEMS:  All Review of systems negative, except for those marked:  Constitutional	Normal: no fever, weight loss, fatigue, repeated infections, loss of appetite  Eyes		Normal: no double or blurred vision, red eye, cataracts, photophobia, eye pain  		[] Comments/Additional Information: Hx of glaucoma w/ complete vision loss in L eye about 5 yrs ago  ENT		Normal: no decreased hearing, discharge, stuffiness, change in voice, difficulty swallowing, mouth sores  Respiratory	Normal: no SOB, asthma, bronchitis, coughing, pain with breathing, TB  Cardiovascular	Normal: no chest pain, palpitations, tachycardia  GI		Normal: no food intolerance, diet change, jaundice, hepatitis, nausea, vomiting, abdominal pain, blood in stool  		[] Abnormal: Occasional diarrhea episodes  Genitourinary	Normal: No difficulty with urination, blood in urine, dysuria		  Integumentary	Normal: No psoriasis, moles, Raynaud’s, normal hair loss  		[] Abnormal: Rash on R should  Psychiatric	Normal: no depression, psychosis, sleeping difficulties, confusion  Endocrine	Normal: no thyroid disease, diabetes, obesity  Neurologic	Normal: no headaches, seizures, speech disturbances, cognitive changes, clumsiness. CN 2-12 intact except below  		[] Abnormal: Numbness over V3 of Trigeminal nerve on R side and in R arm  Hematologic/Lymph	Normal: no low HCT, blood transfusions, lymph node enlargement, bleeding, bruising  Musculoskeletal		Normal: no joint pain, cramps, myalgias  	            []Abnormal: Weakness in R leg (Chronic)    MEDICATIONS  (STANDING):  aspirin  chewable 81 milliGRAM(s) Oral daily  atorvastatin 80 milliGRAM(s) Oral at bedtime  dextrose 5%. 1000 milliLiter(s) (50 mL/Hr) IV Continuous <Continuous>  dextrose 50% Injectable 25 Gram(s) IV Push once  dextrose 50% Injectable 25 Gram(s) IV Push once  dextrose 50% Injectable 12.5 Gram(s) IV Push once  enoxaparin Injectable 30 milliGRAM(s) SubCutaneous daily  insulin lispro (HumaLOG) corrective regimen sliding scale   SubCutaneous three times a day before meals  insulin lispro Injectable (HumaLOG) 3 Unit(s) SubCutaneous three times a day before meals    MEDICATIONS  (PRN):  dextrose 40% Gel 15 Gram(s) Oral once PRN Blood Glucose LESS THAN 70 milliGRAM(s)/deciliter  glucagon  Injectable 1 milliGRAM(s) IntraMuscular once PRN Glucose LESS THAN 70 milligrams/deciliter    Allergies: NKDA    PAST MEDICAL & SURGICAL HISTORY:  CVA (cerebral vascular accident)  Hypertension  Diabetes mellitus  Diabetes  No significant past surgical history    FAMILY HISTORY: No family hx of any autoimmune processes such as SLE, RA, IBD    Vital Signs Last 24 Hrs  T(C): 36.9 (30 Sep 2019 07:11), Max: 37.1 (29 Sep 2019 22:21)  T(F): 98.5 (30 Sep 2019 07:11), Max: 98.7 (29 Sep 2019 22:21)  HR: 96 (30 Sep 2019 09:32) (64 - 96)  BP: 167/91 (30 Sep 2019 09:32) (119/77 - 170/88)  BP(mean): --  RR: 20 (30 Sep 2019 07:11) (17 - 20)  SpO2: 100% (30 Sep 2019 09:32) (96% - 100%)    PHYSICAL EXAM:  All physical exam findings normal, except for those marked:  General Appearance:  Skin 		WNL: no rash, lesion, ulcers, indurations, nodules or tightening, normal nail bed capillaries  		[] Abnormal: 5cm hyperpigmented patch over L shoulder  Eyes		WNL: normal conjunctiva and lids, normal pupils and iris  ENT		WNL: normal appearance of ears, nose lips, teeth, gums, oropharynx, oral mucosal and palate  Neck: 		WNL: no masses, normal thyroid  Cardiovascular: WNL: normal auscultation, normal peripheral pulses, no peripheral edema  Respiratory: 	WNL: normal respiratory effort  GI:		WNL: nontender, nondistended, normoactive bowel sounds  Lymphatic: 	WNL: normal cervical, axillary and inguinal nodes  Neurologic: 	WNL: normal DTR’s, normal sensation  		[] Abnormal: Numbness noted over R CN V (V3) and R arm  Psychiatric: 	WNL: normal judgment and insight, normal memory, normal mood and affect  Musculoskeletal:	WNL: normal digits, normal muscle strength, full ROM,    Lab Results:             12.2   6.15  )-----------( 244      ( 30 Sep 2019 08:14 )             41.6     134<L>  |  104  |  60<H>  ----------------------------<  150<H>  5.1   |  20<L>  |  1.93<H>    Ca    10.3      30 Sep 2019 12:03    Lipid Profile (09.30.19 @ 09:19)    Total Cholesterol/HDL Ratio Measurement: 6.0 RATIO    Cholesterol, Serum: 384 mg/dL    Triglycerides, Serum: 251 mg/dL    HDL Cholesterol, Serum: 64    ESR: 104, CRP: 0.16

## 2019-09-30 NOTE — PROGRESS NOTE ADULT - SUBJECTIVE AND OBJECTIVE BOX
THE PATIENT WAS SEEN AND EXAMINED BY ME WITH THE HOUSESTAFF AND STROKE TEAM DURING MORNING ROUNDS.   HPI:  59 year old RH F with a PMH of stroke? 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 LKN was 18:30 on 9/27.  Patient had previously taken aspirin, but does not remember why she decided to stop taking it. Patient does not have a neurologist. Patient ambulates with a cane due to right sided weakness/states she drifts to the right without a cane for the past 3 years. Patient stated that she gets physical therapy twice per week. NIHSS: 1 (right hand numbness) MRS: 2     SUBJECTIVE: No events overnight.  No new neurologic complaints.      aspirin  chewable 81 milliGRAM(s) Oral daily  atorvastatin 80 milliGRAM(s) Oral at bedtime  dextrose 40% Gel 15 Gram(s) Oral once PRN  dextrose 5%. 1000 milliLiter(s) IV Continuous <Continuous>  dextrose 50% Injectable 25 Gram(s) IV Push once  dextrose 50% Injectable 25 Gram(s) IV Push once  dextrose 50% Injectable 12.5 Gram(s) IV Push once  enoxaparin Injectable 30 milliGRAM(s) SubCutaneous daily  glucagon  Injectable 1 milliGRAM(s) IntraMuscular once PRN  insulin lispro (HumaLOG) corrective regimen sliding scale   SubCutaneous three times a day before meals  insulin lispro Injectable (HumaLOG) 3 Unit(s) SubCutaneous three times a day before meals      PHYSICAL EXAM:   Vital Signs Last 24 Hrs  T(C): 36.9 (30 Sep 2019 07:11), Max: 37.1 (29 Sep 2019 22:21)  T(F): 98.5 (30 Sep 2019 07:11), Max: 98.7 (29 Sep 2019 22:21)  HR: 80 (30 Sep 2019 07:11) (64 - 82)  BP: 125/84 (30 Sep 2019 07:11) (119/77 - 170/88)  BP(mean): --  RR: 20 (30 Sep 2019 07:11) (17 - 20)  SpO2: 96% (30 Sep 2019 07:11) (96% - 100%)    General: No acute distress  HEENT: EOM intact, visual fields full  Abdomen: Soft, nontender, nondistended   Extremities: No edema    NEUROLOGICAL EXAM:  Mental status: Awake, alert, oriented x3, no aphasia, no neglect, normal memory   Cranial Nerves: No facial asymmetry, no nystagmus, no dysarthria,  tongue midline  Motor exam: Normal tone, no drift, 5/5 RUE, 5/5 RLE, 5/5 LUE, 5/5 LLE, normal fine finger movements.  Sensation: Intact to light touch   Coordination/ Gait: No dysmetria, EMANUEL intact and symmetric bilaterally    LABS:                        12.1   7.9   )-----------( 277      ( 28 Sep 2019 14:02 )             38.4    09-30    135  |  110<H>  |  55<H>  ----------------------------<  153<H>  5.5<H>   |  13<L>  |  1.80<H>    Ca    10.4      30 Sep 2019 06:30  Phos  3.8     09-28  Mg     1.9     09-28    TPro  7.9  /  Alb  4.1  /  TBili  0.3  /  DBili  x   /  AST  9<L>  /  ALT  6<L>  /  AlkPhos  95  09-28  PT/INR - ( 28 Sep 2019 14:02 )   PT: 9.7 sec;   INR: 0.85 ratio         PTT - ( 28 Sep 2019 14:02 )  PTT:36.4 sec  Hemoglobin A1C, Whole Blood: 9.4 % (09-29 @ 09:35)      IMAGING: Reviewed by me.   MRI/MR Angio Head Neck No Cont (09.29.19)  Acute infarct left thalamus new since 8/19/2016. Small vessel   white matter ischemic changes. [place] there is multifocal narrowing of the   distal anterior middle and posterior cerebral arteries bilaterally which   are new in the interval. This is suspicious for vasculitis or vasospasm   confirmed with conventional angiography.     CT Head No Cont (09.28.19)  No CT evidence of acute intracranial hemorrhage, extra-axial collection,   or mass effect. THE PATIENT WAS SEEN AND EXAMINED BY ME WITH THE HOUSESTAFF AND STROKE TEAM DURING MORNING ROUNDS.   HPI:  59 year old RH F with a PMH of stroke? 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 LKN was 18:30 on 9/27.  Patient had previously taken aspirin, but does not remember why she decided to stop taking it. Patient does not have a neurologist. Patient ambulates with a cane due to right sided weakness/states she drifts to the right without a cane for the past 3 years. Patient stated that she gets physical therapy twice per week. NIHSS: 1 (right hand numbness) MRS: 2     SUBJECTIVE: No events overnight.  No new neurologic complaints.      aspirin  chewable 81 milliGRAM(s) Oral daily  atorvastatin 80 milliGRAM(s) Oral at bedtime  dextrose 40% Gel 15 Gram(s) Oral once PRN  dextrose 5%. 1000 milliLiter(s) IV Continuous <Continuous>  dextrose 50% Injectable 25 Gram(s) IV Push once  dextrose 50% Injectable 25 Gram(s) IV Push once  dextrose 50% Injectable 12.5 Gram(s) IV Push once  enoxaparin Injectable 30 milliGRAM(s) SubCutaneous daily  glucagon  Injectable 1 milliGRAM(s) IntraMuscular once PRN  insulin lispro (HumaLOG) corrective regimen sliding scale   SubCutaneous three times a day before meals  insulin lispro Injectable (HumaLOG) 3 Unit(s) SubCutaneous three times a day before meals    PHYSICAL EXAM:   Vital Signs Last 24 Hrs  T(C): 36.9 (30 Sep 2019 07:11), Max: 37.1 (29 Sep 2019 22:21)  T(F): 98.5 (30 Sep 2019 07:11), Max: 98.7 (29 Sep 2019 22:21)  HR: 80 (30 Sep 2019 07:11) (64 - 82)  BP: 125/84 (30 Sep 2019 07:11) (119/77 - 170/88)  RR: 20 (30 Sep 2019 07:11) (17 - 20)  SpO2: 96% (30 Sep 2019 07:11) (96% - 100%)    General: No acute distress  HEENT: EOM intact, visual fields full  Abdomen: Soft, nontender, nondistended   Extremities: No edema    NEUROLOGICAL EXAM:  Mental status: Awake, eating lunch in the chair, following commands, IDs objects   Cranial Nerves: No facial asymmetry, no nystagmus, no dysarthria,  tongue midline  Motor exam: Normal tone, right upper extremities paresthesias, full strength throughout    Sensation: decreased on right side   Coordination/ Gait: No dysmetria, gait deferred.     LABS:                        12.1   7.9   )-----------( 277      ( 28 Sep 2019 14:02 )             38.4    09-30    135  |  110<H>  |  55<H>  ----------------------------<  153<H>  5.5<H>   |  13<L>  |  1.80<H>    Ca    10.4      30 Sep 2019 06:30  Phos  3.8     09-28  Mg     1.9     09-28    TPro  7.9  /  Alb  4.1  /  TBili  0.3  /  DBili  x   /  AST  9<L>  /  ALT  6<L>  /  AlkPhos  95  09-28  PT/INR - ( 28 Sep 2019 14:02 )   PT: 9.7 sec;   INR: 0.85 ratio         PTT - ( 28 Sep 2019 14:02 )  PTT:36.4 sec  Hemoglobin A1C, Whole Blood: 9.4 % (09-29 @ 09:35)      IMAGING: Reviewed by me.   MRI/MR Angio Head Neck No Cont (09.29.19)  Acute infarct left thalamus new since 8/19/2016. Small vessel   white matter ischemic changes. [place] there is multifocal narrowing of the   distal anterior middle and posterior cerebral arteries bilaterally which   are new in the interval. This is suspicious for vasculitis or vasospasm   confirmed with conventional angiography.     CT Head No Cont (09.28.19)  No CT evidence of acute intracranial hemorrhage, extra-axial collection,   or mass effect.

## 2019-09-30 NOTE — CONSULT NOTE ADULT - ASSESSMENT
59 year old RH F with a PMH of stroke? 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  rifts to the right without a cane for the past 3 years. Brain MRI revealed restricted diffusion at the left thalamus. Head and neck MRA with areas of significant constriction follow by dilation (beads in a string pattern) at  the anterior and posterior cerebral arteries concerning for vasculitis. now is being scheduled for neuro angio. pt with smooth vs borderline ckd IV  [ her weight is 118lb]     1- smooth   2- htn   3- suspected vasculitis   4- uncontrolled DM   5- proteinuria on dipstick       to have urine pro/cr ratio    c3/c4/nusrat/c-anca/p-anca   hyperglycemia control   she will stand at risk for worsening renal function including renal replacement therapy as well with iv contrast.   if neuro angio planned then to receive emma-procedure ivf hydration

## 2019-09-30 NOTE — PHYSICAL THERAPY INITIAL EVALUATION ADULT - PERTINENT HX OF CURRENT PROBLEM, REHAB EVAL
Pt is a 59 y.o. female with a PMH of stroke? 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 LKN was 18:30 on 9/27. Brain MRI revealed restricted diffusion at the left thalamus. Continued below.

## 2019-09-30 NOTE — CONSULT NOTE ADULT - ATTENDING COMMENTS
Patient seen and examined at bedside. Agree with assessment and plan as stated above. Please call [phone] for any questions or concerns

## 2019-09-30 NOTE — OCCUPATIONAL THERAPY INITIAL EVALUATION ADULT - PERTINENT HX OF CURRENT PROBLEM, REHAB EVAL
58yo RH F with a PMH of stroke 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 LKN was 18:30 on 9/27. Physical exam significant for right hand numbness. CTH w/o showed no acute findings.

## 2019-09-30 NOTE — PHYSICAL THERAPY INITIAL EVALUATION ADULT - ADDITIONAL COMMENTS
Pt lives with her 22 y.o. son in a 11th floor apt with 0 steps, +elevator. Pt was independent with all ADLs and ambulation PTA. Pt used a cane for ambulation indoors and shopping cart outdoors PTA. Pt is R hand dominant.

## 2019-09-30 NOTE — OCCUPATIONAL THERAPY INITIAL EVALUATION ADULT - ADL RETRAINING, OT EVAL
GOAL: Pt will perform LB dressing independently in 4 weeks. GOAL: Patient will perform grooming standing sink level independently in 4 weeks.

## 2019-09-30 NOTE — PHYSICAL THERAPY INITIAL EVALUATION ADULT - PRECAUTIONS/LIMITATIONS, REHAB EVAL
fall precautions/Head and neck MRA with areas of significant constriction follow by dilation (beads in a string pattern) at the anterior and posterior cerebral arteries concerning for vasculitis. (-) Head CT 9/28/19.

## 2019-09-30 NOTE — CONSULT NOTE ADULT - SUBJECTIVE AND OBJECTIVE BOX
[facility]  [phone]  NEPHROLOGY      INITIAL CONSULT NOTE  --------------------------------------------------------------------------------  HPI:      59 year old RH F with a PMH of stroke? 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  Patient ambulates with a cane due to right sided weakness/states she drifts to the right without a cane for the past 3 years.    Brain MRI revealed restricted diffusion at the left thalamus. Head and neck MRA with areas of significant constriction follow by dilation (beads in a string pattern) at  the anterior and posterior cerebral arteries concerning for vasculitis. now is being scheduled for neuro angio. renal consult called. pt last known cr in system from 2016 reveals cr 1.1  states weights 118 lb    states was noticed with abn creatinine as outpt with her pmd     PAST HISTORY  --------------------------------------------------------------------------------  PAST MEDICAL & SURGICAL HISTORY:        CVA (cerebral vascular accident)  Hypertension  Diabetes mellitus  Diabetes  No significant past surgical history    FAMILY HISTORY:  Family history of cerebrovascular accident (CVA)  No pertinent family history in first degree relatives    PAST SOCIAL HISTORY: past tobacco and alcohol use quit 6 mo ago     ALLERGIES & MEDICATIONS  --------------------------------------------------------------------------------  Allergies    No Known Allergies    Intolerances      Standing Inpatient Medications  aspirin  chewable 81 milliGRAM(s) Oral daily  atorvastatin 80 milliGRAM(s) Oral at bedtime  dextrose 5%. 1000 milliLiter(s) IV Continuous <Continuous>  dextrose 50% Injectable 25 Gram(s) IV Push once  dextrose 50% Injectable 25 Gram(s) IV Push once  dextrose 50% Injectable 12.5 Gram(s) IV Push once  enoxaparin Injectable 30 milliGRAM(s) SubCutaneous daily  insulin lispro (HumaLOG) corrective regimen sliding scale   SubCutaneous three times a day before meals  insulin lispro Injectable (HumaLOG) 3 Unit(s) SubCutaneous three times a day before meals    PRN Inpatient Medications  dextrose 40% Gel 15 Gram(s) Oral once PRN  glucagon  Injectable 1 milliGRAM(s) IntraMuscular once PRN      REVIEW OF SYSTEMS  --------------------------------------------------------------------------------  Gen: No  fevers/chills   Skin: No rashes  Head/Eyes/Ears/Mouth: +  headache; Normal hearing;  No sinus pain/discomfort, sore throat  Respiratory: No dyspnea, cough, wheezing,   CV: No chest pain, orthopnea  GI: No abdominal pain, diarrhea, nausea, vomiting, melena  : No dysuria, decrease urination or hesitancy urinating  hematuria, nocturia  MSK: No joint pain/swelling; no back pain  Neuro: No dizziness/lightheadedness,   also with no edema     All other systems were reviewed and are negative, except as noted.    VITALS/PHYSICAL EXAM  --------------------------------------------------------------------------------  T(C): 36.6 (09-30-19 @ 19:17), Max: 36.9 (09-30-19 @ 07:11)  HR: 87 (09-30-19 @ 19:17) (76 - 96)  BP: 128/83 (09-30-19 @ 19:17) (122/86 - 167/91)  RR: 18 (09-30-19 @ 19:17) (18 - 20)  SpO2: 100% (09-30-19 @ 19:17) (96% - 100%)  Wt(kg): --        09-30-19 @ 07:01  -  09-30-19 @ 23:34  --------------------------------------------------------  IN: 120 mL / OUT: 0 mL / NET: 120 mL      Physical Exam:  	Gen: Non toxic comfortable appearing   	no jvd ,   	Pulm: CTA no rales or ronchi or wheezing  	CV: RRR, S1S2; no rub  	Back: No CVA tenderness  	Abd: +BS, soft, nontender/nondistended  	: No suprapubic tenderness  	UE: Warm, no cyanosis  no clubbing,  no edema  	LE: Warm, no cyanosis  no clubbing, no edema  	Neuro: alert and oriented. speech coherent   	Skin: Warm, no decrease skin turgor   	    LABS/STUDIES  --------------------------------------------------------------------------------              12.2   6.15  >-----------<  244      [09-30-19 @ 08:14]              41.6     134  |  104  |  60  ----------------------------<  150      [09-30-19 @ 12:03]  5.1   |  20  |  1.93        Ca     10.3     [09-30-19 @ 12:03]            Creatinine Trend:  SCr 1.93 [09-30 @ 12:03]  SCr 1.80 [09-30 @ 06:30]  SCr 1.74 [09-28 @ 14:02]    Urinalysis - [08-19-16 @ 18:37]      Color COLORL / Appearance CLEAR / SG 1.007 / pH 6.5      Gluc 500 / Ketone NEGATIVE  / Bili NEGATIVE / Urobili NORMAL       Blood NEGATIVE / Protein 30 / Leuk Est NEGATIVE / Nitrite NEGATIVE      RBC 0-2 / WBC 0-2 / Hyaline  / Gran  / Sq Epi OCC / Non Sq Epi  / Bacteria       HbA1c 9.6      [09-30-19 @ 08:11]  TSH 1.38      [09-28-19 @ 15:57]  Lipid: chol 384, , HDL 64,       [09-30-19 @ 09:19]    HCV 0.08, Nonreact      [09-30-19 @ 14:29]    < from: MR Angio Neck No Cont (09.29.19 @ 09:40) >  EXAM:  MR BRAIN                          EXAM:  MR ANGIO NECK                          EXAM:  MR ANGIO BRAIN                            PROCEDURE DATE:  09/29/2019            INTERPRETATION:    CLINICAL INDICATION: Right-sided face numbness      Magnetic resonance imaging of the brain was carried out with transaxial   SPGR, FLAIR, fast spin echo T2 weighted images, axial susceptibility   weighted series, diffusion weighted series and sagittal T1 weighted   series on a 1.5 Sherrill magnet.    Comparison is made with the prior CT 9/28/2019 and MRI 8/19/2016.           fourth, third and lateral ventricles are normal in size and position.   There is no hemorrhage, mass or shift of the midline structures.   Scattered small vessel white matter ischemic changes are noted. There is   an acute infarct in the left thalamus erosion restriction. No acute   hemorrhage is identified. The sellar and parasellar structures are   unremarkable. The paranasal sinuses are clear.          Magnetic resonance angiography of the carotid and vertebral circulation   the neck was obtained using 2D time-of-flight technique with an   additional 3D time-of-flight acquisition through the carotid bifurcation.   The intracranial circulation centered the [id number] was evaluated   using 3D time-of-flight technique.    The common, internal and external carotid arteries are unremarkable   bilaterally. There is no carotid stenosis. The right vertebral artery is   dominant.     Intracranially, petrous, cavernous and supraclinoid internal carotid   arteries are normal. There are multifocal areas of narrowing involving   the distal middle cerebral artery branches as well as the A2 branches in   the anterior interhemispheric fissure. There is also narrowing of the   distal posterior cerebral arteries. This is suspicious for vasculitis or   vasculopathy and is new when compared with 8/19/2016. This can be   confirmed with conventional angiography.    The distal vertebral arteries and basilar artery are normal.      IMPRESSION: Acute infarct left thalamus new since 8/19/2016. Small vessel   white matter ischemic changes. [place] there is multifocal narrowing of the   distal anterior middle and posterior cerebral arteries bilaterally which   are new in the interval. This is suspicious for vasculitis or vasospasm   confirmed with conventional angiography.      Dr. Arreola discussed these findings with neurology resident on 9/29/2019   9:56 AM with read back.                    ABDOULAYE ARREOLA M.D., ATTENDING RADIOLOGIST  This document has been electronically signed. Sep 29 2019  9:58AM              < end of copied text >

## 2019-09-30 NOTE — OCCUPATIONAL THERAPY INITIAL EVALUATION ADULT - ANTICIPATED DISCHARGE DISPOSITION, OT EVAL
Home OT to address deficits, assess home safety, increase independence in ADLs and functional mobility. Supervision/assistance with functional activity./home w/ OT acute rehab

## 2019-10-01 LAB
ANION GAP SERPL CALC-SCNC: 13 MMOL/L — SIGNIFICANT CHANGE UP (ref 5–17)
AUTO DIFF PNL BLD: NEGATIVE — SIGNIFICANT CHANGE UP
BUN SERPL-MCNC: 78 MG/DL — HIGH (ref 7–23)
C-ANCA SER-ACNC: NEGATIVE — SIGNIFICANT CHANGE UP
C3 SERPL-MCNC: 140 MG/DL — SIGNIFICANT CHANGE UP (ref 81–157)
C4 SERPL-MCNC: 49 MG/DL — HIGH (ref 13–39)
CALCIUM SERPL-MCNC: 10 MG/DL — SIGNIFICANT CHANGE UP (ref 8.4–10.5)
CHLORIDE SERPL-SCNC: 101 MMOL/L — SIGNIFICANT CHANGE UP (ref 96–108)
CO2 SERPL-SCNC: 17 MMOL/L — LOW (ref 22–31)
CREAT ?TM UR-MCNC: 116 MG/DL — SIGNIFICANT CHANGE UP
CREAT SERPL-MCNC: 2.32 MG/DL — HIGH (ref 0.5–1.3)
DSDNA AB SER-ACNC: <12 IU/ML — SIGNIFICANT CHANGE UP
GLUCOSE BLDC GLUCOMTR-MCNC: 118 MG/DL — HIGH (ref 70–99)
GLUCOSE BLDC GLUCOMTR-MCNC: 155 MG/DL — HIGH (ref 70–99)
GLUCOSE BLDC GLUCOMTR-MCNC: 207 MG/DL — HIGH (ref 70–99)
GLUCOSE BLDC GLUCOMTR-MCNC: 87 MG/DL — SIGNIFICANT CHANGE UP (ref 70–99)
GLUCOSE SERPL-MCNC: 181 MG/DL — HIGH (ref 70–99)
HCT VFR BLD CALC: 38.9 % — SIGNIFICANT CHANGE UP (ref 34.5–45)
HGB BLD-MCNC: 12.1 G/DL — SIGNIFICANT CHANGE UP (ref 11.5–15.5)
MCHC RBC-ENTMCNC: 26.2 PG — LOW (ref 27–34)
MCHC RBC-ENTMCNC: 31.1 GM/DL — LOW (ref 32–36)
MCV RBC AUTO: 84.2 FL — SIGNIFICANT CHANGE UP (ref 80–100)
P-ANCA SER-ACNC: NEGATIVE — SIGNIFICANT CHANGE UP
PCP SPEC-MCNC: SIGNIFICANT CHANGE UP
PLATELET # BLD AUTO: 306 K/UL — SIGNIFICANT CHANGE UP (ref 150–400)
POTASSIUM SERPL-MCNC: 5.3 MMOL/L — SIGNIFICANT CHANGE UP (ref 3.5–5.3)
POTASSIUM SERPL-SCNC: 5.3 MMOL/L — SIGNIFICANT CHANGE UP (ref 3.5–5.3)
PROT ?TM UR-MCNC: 109 MG/DL — HIGH (ref 0–12)
PROT/CREAT UR-RTO: 0.9 RATIO — HIGH (ref 0–0.2)
RBC # BLD: 4.62 M/UL — SIGNIFICANT CHANGE UP (ref 3.8–5.2)
RBC # FLD: 13 % — SIGNIFICANT CHANGE UP (ref 10.3–14.5)
SODIUM SERPL-SCNC: 131 MMOL/L — LOW (ref 135–145)
T PALLIDUM AB TITR SER: NEGATIVE — SIGNIFICANT CHANGE UP
T PALLIDUM AB TITR SER: NEGATIVE — SIGNIFICANT CHANGE UP
WBC # BLD: 5.96 K/UL — SIGNIFICANT CHANGE UP (ref 3.8–10.5)
WBC # FLD AUTO: 5.96 K/UL — SIGNIFICANT CHANGE UP (ref 3.8–10.5)

## 2019-10-01 PROCEDURE — 99233 SBSQ HOSP IP/OBS HIGH 50: CPT

## 2019-10-01 PROCEDURE — 99232 SBSQ HOSP IP/OBS MODERATE 35: CPT | Mod: GC

## 2019-10-01 RX ORDER — SODIUM CHLORIDE 9 MG/ML
1000 INJECTION INTRAMUSCULAR; INTRAVENOUS; SUBCUTANEOUS
Refills: 0 | Status: DISCONTINUED | OUTPATIENT
Start: 2019-10-01 | End: 2019-10-02

## 2019-10-01 RX ADMIN — ATORVASTATIN CALCIUM 80 MILLIGRAM(S): 80 TABLET, FILM COATED ORAL at 22:00

## 2019-10-01 RX ADMIN — Medication 3 UNIT(S): at 17:46

## 2019-10-01 RX ADMIN — Medication 2: at 13:19

## 2019-10-01 RX ADMIN — SODIUM CHLORIDE 50 MILLILITER(S): 9 INJECTION INTRAMUSCULAR; INTRAVENOUS; SUBCUTANEOUS at 22:00

## 2019-10-01 RX ADMIN — ENOXAPARIN SODIUM 30 MILLIGRAM(S): 100 INJECTION SUBCUTANEOUS at 13:19

## 2019-10-01 RX ADMIN — Medication 81 MILLIGRAM(S): at 13:19

## 2019-10-01 RX ADMIN — Medication 3 UNIT(S): at 13:18

## 2019-10-01 RX ADMIN — Medication 1: at 09:00

## 2019-10-01 RX ADMIN — Medication 3 UNIT(S): at 09:00

## 2019-10-01 NOTE — PROGRESS NOTE ADULT - SUBJECTIVE AND OBJECTIVE BOX
Patient is a 59y old  Female who presents with a chief complaint of Stroke (30 Sep 2019 12:48)    Overnight: C/o slight HA in frontal area. Denies any temporal pain or changes in her vision. Endorses improvement in her numbness.     MEDICATIONS  (STANDING):  aspirin  chewable 81 milliGRAM(s) Oral daily  atorvastatin 80 milliGRAM(s) Oral at bedtime  dextrose 5%. 1000 milliLiter(s) (50 mL/Hr) IV Continuous <Continuous>  dextrose 50% Injectable 25 Gram(s) IV Push once  dextrose 50% Injectable 25 Gram(s) IV Push once  dextrose 50% Injectable 12.5 Gram(s) IV Push once  enoxaparin Injectable 30 milliGRAM(s) SubCutaneous daily  insulin lispro (HumaLOG) corrective regimen sliding scale   SubCutaneous three times a day before meals  insulin lispro Injectable (HumaLOG) 3 Unit(s) SubCutaneous three times a day before meals    MEDICATIONS  (PRN):  dextrose 40% Gel 15 Gram(s) Oral once PRN Blood Glucose LESS THAN 70 milliGRAM(s)/deciliter  glucagon  Injectable 1 milliGRAM(s) IntraMuscular once PRN Glucose LESS THAN 70 milligrams/deciliter    Allergies: NKDA    PAST MEDICAL & SURGICAL HISTORY:  CVA (cerebral vascular accident)  Hypertension  Diabetes mellitus  Diabetes  No significant past surgical history    FAMILY HISTORY: No family hx of any autoimmune processes such as SLE, RA, IBD    Vital Signs Last 24 Hrs  Vital Signs Last 24 Hrs  T(C): 36.8 (01 Oct 2019 15:03), Max: 36.8 (01 Oct 2019 05:55)  T(F): 98.2 (01 Oct 2019 15:03), Max: 98.3 (01 Oct 2019 05:55)  HR: 87 (01 Oct 2019 15:03) (67 - 91)  BP: 125/84 (01 Oct 2019 15:03) (112/79 - 137/82)  BP(mean): --  RR: 18 (01 Oct 2019 15:03) (18 - 18)  SpO2: 100% (01 Oct 2019 15:03) (100% - 100%)    PHYSICAL EXAM:  All physical exam findings normal, except for those marked:  General Appearance:  Skin 		WNL: no rash, lesion, ulcers, indurations, nodules or tightening, normal nail bed capillaries  		[] Abnormal: 5cm hyperpigmented patch over L shoulder  Eyes		WNL: normal conjunctiva and lids, normal pupils and iris  ENT		WNL: normal appearance of ears, nose lips, teeth, gums, oropharynx, oral mucosal and palate  Neck: 		WNL: no masses, normal thyroid  Cardiovascular: WNL: normal auscultation, normal peripheral pulses, no peripheral edema  Respiratory: 	WNL: normal respiratory effort  GI:		WNL: nontender, nondistended, normoactive bowel sounds  Lymphatic: 	WNL: normal cervical, axillary and inguinal nodes  Neurologic: 	WNL: normal DTR’s, normal sensation  		[] Abnormal: Numbness noted over R CN V (V3) and R arm  Psychiatric: 	WNL: normal judgment and insight, normal memory, normal mood and affect  Musculoskeletal:	WNL: normal digits, normal muscle strength, full ROM,    Lab Results:             12.2   6.15  )-----------( 244      ( 30 Sep 2019 08:14 )             41.6     134<L>  |  104  |  60<H>  ----------------------------<  150<H>  5.1   |  20<L>  |  1.93<H>    Ca    10.3      30 Sep 2019 12:03    Lipid Profile (09.30.19 @ 09:19)    Total Cholesterol/HDL Ratio Measurement: 6.0 RATIO    Cholesterol, Serum: 384 mg/dL    Triglycerides, Serum: 251 mg/dL    HDL Cholesterol, Serum: 64    ESR: 104, CRP: 0.16  C3/C4: 140/49  dsDNA: 12

## 2019-10-01 NOTE — PROGRESS NOTE ADULT - SUBJECTIVE AND OBJECTIVE BOX
[facility]   [phone]  RENAL FOLLOW UP NOTE  --------------------------------------------------------------------------------  Chief Complaint:    24 hour events/subjective:    seen earlier. states feels better overall urinating well     PAST HISTORY  --------------------------------------------------------------------------------  No significant changes to PMH, PSH, FHx, SHx, unless otherwise noted    ALLERGIES & MEDICATIONS  --------------------------------------------------------------------------------  Allergies    No Known Allergies    Intolerances      Standing Inpatient Medications  aspirin  chewable 81 milliGRAM(s) Oral daily  atorvastatin 80 milliGRAM(s) Oral at bedtime  dextrose 5%. 1000 milliLiter(s) IV Continuous <Continuous>  dextrose 50% Injectable 25 Gram(s) IV Push once  dextrose 50% Injectable 25 Gram(s) IV Push once  dextrose 50% Injectable 12.5 Gram(s) IV Push once  enoxaparin Injectable 30 milliGRAM(s) SubCutaneous daily  insulin lispro (HumaLOG) corrective regimen sliding scale   SubCutaneous three times a day before meals  insulin lispro Injectable (HumaLOG) 3 Unit(s) SubCutaneous three times a day before meals  sodium chloride 0.9%. 1000 milliLiter(s) IV Continuous <Continuous>    PRN Inpatient Medications  dextrose 40% Gel 15 Gram(s) Oral once PRN  glucagon  Injectable 1 milliGRAM(s) IntraMuscular once PRN      REVIEW OF SYSTEMS  --------------------------------------------------------------------------------    Gen: denies , fevers/chills,  CVS: denies chest pain/palpitations  Resp: denies SOB/Cough  GI: Denies N/V/Abd pain  : Denies dysuria/oliguria/hematuria    All other systems were reviewed and are negative, except as noted.    VITALS/PHYSICAL EXAM  --------------------------------------------------------------------------------  T(C): 36.9 (10-01-19 @ 19:19), Max: 36.9 (10-01-19 @ 19:19)  HR: 86 (10-01-19 @ 19:19) (67 - 91)  BP: 139/90 (10-01-19 @ 19:19) (112/79 - 139/90)  RR: 17 (10-01-19 @ 19:19) (17 - 18)  SpO2: 99% (10-01-19 @ 19:19) (99% - 100%)  Wt(kg): --        09-30-19 @ 07:01  -  10-01-19 @ 07:00  --------------------------------------------------------  IN: 120 mL / OUT: 0 mL / NET: 120 mL    10-01-19 @ 07:01  -  10-01-19 @ 21:23  --------------------------------------------------------  IN: 580 mL / OUT: 0 mL / NET: 580 mL      Physical Exam:  	  	Gen: Non toxic comfortable appearing   	no jvd ,   	Pulm: CTA no rales or ronchi or wheezing  	CV: RRR, S1S2; no rub  	Abd: +BS, soft, nontender/nondistended  	: No suprapubic tenderness  	UE: Warm, no cyanosis  no clubbing,  no edema  	LE: Warm, no cyanosis  no clubbing, no edema  	Neuro: alert and oriented. speech coherent   	Skin: Warm, no decrease skin turgor   	    LABS/STUDIES  --------------------------------------------------------------------------------              12.1   5.96  >-----------<  306      [10-01-19 @ 12:25]              38.9     131  |  101  |  78  ----------------------------<  181      [10-01-19 @ 09:56]  5.3   |  17  |  2.32        Ca     10.0     [10-01-19 @ 09:56]            Creatinine Trend:  SCr 2.32 [10-01 @ 09:56]  SCr 1.93 [09-30 @ 12:03]  SCr 1.80 [09-30 @ 06:30]  SCr 1.74 [09-28 @ 14:02]                Urine Creatinine 116      [10-01-19 @ 16:50]  Urine Protein 109      [10-01-19 @ 16:50]    HbA1c 9.6      [09-30-19 @ 08:11]  TSH 1.38      [09-28-19 @ 15:57]  Lipid: chol 384, , HDL 64,       [09-30-19 @ 09:19]    dsDNA <12      [09-30-19 @ 08:02]  C3 Complement 140      [10-01-19 @ 12:25]  C4 Complement 49      [10-01-19 @ 12:25]  ANCA: cANCA Negative, pANCA Negative, atypical ANCA Negative      [09-30-19 @ 08:02]  Syphilis Screen (Treponema Pallidum Ab) Negative      [10-01-19 @ 01:02]

## 2019-10-01 NOTE — PROGRESS NOTE ADULT - SUBJECTIVE AND OBJECTIVE BOX
Cardiovascular Disease Progress Note  Covering for Dr. Hernandez    Overnight events: No acute events overnight. Ms. Aburto is eating lunch. She denies chest pain or SOB.   Otherwise review of systems negative    Objective Findings:  T(C): 36.7 (10-01-19 @ 11:15), Max: 36.8 (09-30-19 @ 15:58)  HR: 91 (10-01-19 @ 11:15) (67 - 92)  BP: 120/82 (10-01-19 @ 11:15) (112/79 - 137/82)  RR: 18 (10-01-19 @ 11:15) (18 - 18)  SpO2: 100% (10-01-19 @ 11:15) (100% - 100%)  Wt(kg): --  Daily     Daily       Physical Exam:  Gen: NAD; Patient resting comfortably  HEENT: EOMI, Normocephalic/ atraumatic  CV: RRR, normal S1 + S2, no m/r/g  Lungs:  Normal respiratory effort; clear to auscultation bilaterally  Abd: soft, non-tender; bowel sounds present  Ext: No edema; warm and well perfused    Telemetry: Sinus    Laboratory Data:                        12.1   5.96  )-----------( 306      ( 01 Oct 2019 12:25 )             38.9     10-01    131<L>  |  101  |  78<H>  ----------------------------<  181<H>  5.3   |  17<L>  |  2.32<H>    Ca    10.0      01 Oct 2019 09:56                Inpatient Medications:  MEDICATIONS  (STANDING):  aspirin  chewable 81 milliGRAM(s) Oral daily  atorvastatin 80 milliGRAM(s) Oral at bedtime  dextrose 5%. 1000 milliLiter(s) (50 mL/Hr) IV Continuous <Continuous>  dextrose 50% Injectable 25 Gram(s) IV Push once  dextrose 50% Injectable 25 Gram(s) IV Push once  dextrose 50% Injectable 12.5 Gram(s) IV Push once  enoxaparin Injectable 30 milliGRAM(s) SubCutaneous daily  insulin lispro (HumaLOG) corrective regimen sliding scale   SubCutaneous three times a day before meals  insulin lispro Injectable (HumaLOG) 3 Unit(s) SubCutaneous three times a day before meals      Assessment: 59 year old woman with HTN, HLD, LVH, uncontrolled IDDM (HbA1c- 9%) and prior CVA presents with acute L CVA    Plan of Care:    #Acute L thalamic CVA-  MRA suggestive of vasculitis.  No evidence of atrial ectopy on telemetry thus far.  Awaiting echocardiogram.   Would hold off on loop recorder unless vasculitis or small vessel disease is considered unlikely to be the cause of CVA.     #LVH-  Due to hypertensive disease.  BP acceptable at present.  Awaiting echo.       Over 25 minutes spent on total encounter; more than 50% of the visit was spent counseling and/or coordinating care by the attending physician.      Viet Felix MD [facility]  Cardiovascular Disease  [phone]

## 2019-10-01 NOTE — PROGRESS NOTE ADULT - ASSESSMENT
59 year old RH F with a PMH of stroke? 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  rifts to the right without a cane for the past 3 years. Brain MRI revealed restricted diffusion at the left thalamus. Head and neck MRA with areas of significant constriction follow by dilation (beads in a string pattern) at  the anterior and posterior cerebral arteries concerning for vasculitis. now is being scheduled for neuro angio. pt with smooth vs borderline ckd IV  [ her weight is 118lb]     1- smooth   2- htn   3- suspected vasculitis   4- uncontrolled DM   5- proteinuria  subnephrotic         c3/c4/nusrat/c-anca/p-anca  neg so far. she is not anemic. etiology of smooth unclear.   bladder scan, renal sono   gentle ivf  cpk   urinalysis   hyperglycemia control   she will stand at risk for worsening renal function including renal replacement therapy as well with iv contrast.   if neuro angio planned then to receive emma-procedure ivf hydration   I am unclear if vasculitis effecting renal parenchyma however, concerning re: worsening renal function

## 2019-10-01 NOTE — SPEECH LANGUAGE PATHOLOGY EVALUATION - SLP DIAGNOSIS
Pt presents with cognitive-lingusitic deficits with impairments in short term memory Pt presents with cognitive-lingusitic deficits with impairments in short term memory and reasoning. Pt presents with mild cognitive-linguistic deficits with impairments in short term memory, reasoning, verbal problem solving and cognitive flexibility. Patient appeared to benefit from repetition for explanation of tasks.

## 2019-10-01 NOTE — SPEECH LANGUAGE PATHOLOGY EVALUATION - SLP PERTINENT HISTORY OF CURRENT PROBLEM
59 year old RH F with a PMH of stroke? 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 LKN was 18:30 on 9/27. Physical exam significant for right hand numbness. CTH w/o showed no acute findings. Impression: Right face/hand numbness possibly secondary to left brain dysfunction from an ischemic stroke of unclear mechanism (likely lacunar stroke) NIHSS: 1 (right hand numbness) MRS: 2

## 2019-10-01 NOTE — PROGRESS NOTE ADULT - ASSESSMENT
ASSESSMENT: 59 year old RH F with a PMH of stroke? 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 LKN was 18:30 on 9/27.  Brain MRI revealed restricted diffusion at the left thalamus. Head and neck MRA with areas of significant constriction follow by dilation (beads in a string pattern) at  the anterior and posterior cerebral arteries concerning for vasculitis.    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     NEURO: neurologically without acute change, continue close monitoring for neurologic deterioration, permissive HTN followed by gradual normotension as tolerated, titrate statin to LDL goal less than 70, MR imaging as noted. Physical therapy/OT recommend AR.     ANTITHROMBOTIC THERAPY: ASA    PULMONARY:  protecting airway, saturating well on room air.     CARDIOVASCULAR: check TTE, cardiac monitoring without any recorded events.                              SBP goal: Permissive HTN to gradual normotension.     GASTROINTESTINAL:  dysphagia screen passed, tolerating diet      Diet: regular     RENAL: BUN/Cr elevated 60/1.93, CKD stage 3b, good urine output , mild hyperkalemia- 5.1 repeat pending will continue to monitor. Nephrology recommended Urine Pro/Cr ratio, C3/C4/MARILOU/C-ANCA/P-ANCA workup and hyperglycemia control. Per Nephrology Dr. Nunez, prior to neuro angio perform IV hydration for renal protection     Na Goal: Greater than 135     Andrade: n    HEMATOLOGY: H/H without anemia, Platelets 244     DVT ppx: LMWH    ID: afebrile, no leukocytosis , no signs or symptoms of infection.     OTHER: Rheumatology consult to evaluate for vasculitis who recommended MRA Chest/Abdomen to assess other vessel involvement, if negative to continue large vessel (temporal) biopsy, labs pending. Spoke with pts son and grandson at bedside, all questions addressed.     DISPOSITION: Acute Rehab once stable and workup is complete      CORE MEASURES:        Admission NIHSS: 1     TPA: [] YES [x] NO      LDL/HDL: 211/49     Depression Screen:      Statin Therapy: y      Dysphagia Screen: [x] PASS [] FAIL     Smoking [] YES [] NO      Afib [] YES [x] NO     Stroke Education [x] YES [] NO    Obtain screening lower extremity venous ultrasound in patients who meet 1 or more of the following criteria as patient is high risk for DVT/PE on admission:   [] History of DVT/PE  []Hypercoagulable states (Factor V Leiden, Cancer, OCP, etc. )  []Prolonged immobility (hemiplegia/hemiparesis/post operative or any other extended immobilization)  [] Transferred from outside facility (Rehab or Long term care)  [] Age </= to 50 ASSESSMENT: 59 year old RH F with a PMH of stroke? 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 LKN was 18:30 on 9/27.  Brain MRI revealed restricted diffusion at the left thalamus. Head and neck MRA with areas of significant constriction follow by dilation (beads in a string pattern) at  the anterior and posterior cerebral arteries noted to be concerning for vasculitis.    Impression: cerebral thrombosis with infarction etiology likely small vessel disease, clinically does not appear to be CNS vasculitis.     NEURO: neurologically without acute change, continue close monitoring for neurologic deterioration, permissive HTN followed by gradual normotension as tolerated, titrate statin to LDL goal less than 70, MR imaging as noted, if able to tolerate at later time can consider repeat vessel imaging for further evaluation. Physical therapy/OT recommend AR.     ANTITHROMBOTIC THERAPY: ASA, add Plavix 3 weeks then resume asa alone there after.     PULMONARY:  protecting airway, saturating well on room air.     CARDIOVASCULAR: check TTE, cardiac monitoring without any recorded events.                              SBP goal: Permissive HTN to gradual normotension.     GASTROINTESTINAL:  dysphagia screen passed, tolerating diet      Diet: regular     RENAL: BUN/Cr elevated 78/2.32, CKD stage 3b, good urine output , mild hyperkalemia resolved - 5.3,  continue to monitor. Nephrology recommended Urine Pro/Cr ratio, C3/C4/MARILOU/C-ANCA/P-ANCA workup and hyperglycemia control. Per Nephrology Dr. Nunez, prior to neuro angio perform IV hydration for renal protection, patient is high risk for worsening renal fxn including renal replacement therapy with IV contrast. Will need monitoring and follow up re: hyponatremia      Na Goal: Greater than 135     Andrade: n    HEMATOLOGY: H/H without anemia, Platelets 244     DVT ppx: LMWH    ID: afebrile, no leukocytosis , no signs or symptoms of infection.     OTHER: Rheumatology consult to evaluate for vasculitis who recommended MRA Chest/Abdomen to assess other vessel involvement, if negative to continue large vessel (temporal) biopsy, labs pending. Spoke with pts son and grandson at bedside, all questions addressed. Endocrine follow up for long term glycemic control.    DISPOSITION: Acute Rehab once stable and workup is complete      CORE MEASURES:        Admission NIHSS: 1     TPA: [] YES [x] NO      LDL/HDL: 211/49     Depression Screen: 0     Statin Therapy: y      Dysphagia Screen: [x] PASS [] FAIL     Smoking [] YES [x] NO      Afib [] YES [x] NO     Stroke Education [x] YES [] NO    Obtain screening lower extremity venous ultrasound in patients who meet 1 or more of the following criteria as patient is high risk for DVT/PE on admission:   [] History of DVT/PE  []Hypercoagulable states (Factor V Leiden, Cancer, OCP, etc. )  []Prolonged immobility (hemiplegia/hemiparesis/post operative or any other extended immobilization)  [] Transferred from outside facility (Rehab or Long term care)  [] Age </= to 50 ASSESSMENT: 59 year old RH F with a PMH of stroke? 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 LKN was 18:30 on 9/27.  Brain MRI revealed restricted diffusion at the left thalamus. Head and neck MRA with areas of significant constriction follow by dilation (beads in a string pattern) at  the anterior and posterior cerebral arteries noted to be concerning for vasculitis.    Impression: cerebral thrombosis with infarction etiology likely small vessel disease, clinically does not appear to be CNS vasculitis.     NEURO: neurologically without acute change, continue close monitoring for neurologic deterioration, permissive HTN followed by gradual normotension as tolerated, titrate statin to LDL goal less than 70, MR imaging as noted, if able to tolerate at later time can consider repeat vessel imaging for further evaluation. Physical therapy/OT recommend AR.     ANTITHROMBOTIC THERAPY: ASA, add Plavix 3 weeks then resume asa alone there after.     PULMONARY:  protecting airway, saturating well on room air.     CARDIOVASCULAR: check TTE, cardiac monitoring without any recorded events.                              SBP goal: Permissive HTN to gradual normotension.     GASTROINTESTINAL:  dysphagia screen passed, tolerating diet      Diet: regular     RENAL: BUN/Cr elevated 78/2.32, CKD stage 3b, good urine output , mild hyperkalemia resolved - 5.3,  continue to monitor. Nephrology recommended Urine Pro/Cr ratio, C3/C4/MARILOU/C-ANCA/P-ANCA workup and hyperglycemia control. Per Nephrology Dr. Nunez, prior to neuro angio perform IV hydration for renal protection, patient is high risk for worsening renal fxn including renal replacement therapy with IV contrast. Will need monitoring and follow up re: hyponatremia      Na Goal: Greater than 135     Andrade: n    HEMATOLOGY: H/H without anemia, Platelets 244     DVT ppx: LMWH    ID: afebrile, no leukocytosis , no signs or symptoms of infection.     OTHER: Rheumatology consult to evaluate for vasculitis who recommended MRA Chest/Abdomen to assess other vessel involvement, if negative to continue large vessel (temporal) biopsy, labs pending. Given that clinically there is a very low suspicion for CNS vasculitis, I do not believe that there is a role for further testing at this time.  Spoke with pts son and grandson at bedside, all questions addressed. Endocrine follow up for long term glycemic control.    DISPOSITION: Acute Rehab once stable and workup is complete      CORE MEASURES:        Admission NIHSS: 1     TPA: [] YES [x] NO      LDL/HDL: 211/49     Depression Screen: 0     Statin Therapy: y      Dysphagia Screen: [x] PASS [] FAIL     Smoking [] YES [x] NO      Afib [] YES [x] NO     Stroke Education [x] YES [] NO    Obtain screening lower extremity venous ultrasound in patients who meet 1 or more of the following criteria as patient is high risk for DVT/PE on admission:   [] History of DVT/PE  []Hypercoagulable states (Factor V Leiden, Cancer, OCP, etc. )  []Prolonged immobility (hemiplegia/hemiparesis/post operative or any other extended immobilization)  [] Transferred from outside facility (Rehab or Long term care)  [] Age </= to 50

## 2019-10-01 NOTE — PROGRESS NOTE ADULT - ASSESSMENT
58 yo RH F w/ PMH of stroke (3 yrs ago (presented w/ right hemiparesis/dysarthria)), HTN, T2DM (w/ ?retinopathy), glaucoma w/ R eye blindness, and hearing loss s/p hearing aids who presented w/ R hand and mouth numbness and R mouth numbness in setting of restricted diffusion in L thalamus w/ other findings of multifocal narrowing of the distal anterior middle and posterior cerebral arteries b/l. Ddx includes vasospasms, infection, reversible cerebral vasoconstriction syndromes, systemic vasculitides.      Recs:  - With worsening kidney function, would recommend temporal vessel bx to assess for vasculitis if no plans to move forward w/ neuro angio  - Consider PET/CT to evaluate for ongoing inflammation in the abnormal vessels   - MPO and Proteinase 3 ordered, awaiting results    Case discussed w/ Dr. Rodriguez  Rheum will follow

## 2019-10-01 NOTE — SPEECH LANGUAGE PATHOLOGY EVALUATION - COMMENTS
9/30 Seen by cards: MRA suggestive of vasculitis. No evidence of atrial ectopy on telemetry thus far. Please obtain echocardiogram with bubble study. Awaiting rheumatology evaluation. Would hold off on loop recorder unless vasculitis is considered unlikely to be the cause of CVA. Seen by PMR: Rx ACUTE inpatient rehab. Seen by rheum: Ddx includes vasospasms, infection, reversible cerebral vasoconstriction syndromes, systemic vasculitides. Recs: MRA of Chest and Abdomen to assess for other vessel involvement. If vessels appear normal w/ no narrowing, can consider large vessel (temporal) biopsy to further evaluate for ?vasculitis. Divergent namin animals in 60sec (norm 15-20), question whether memory or processing play a role DNT WFL

## 2019-10-01 NOTE — PROGRESS NOTE ADULT - SUBJECTIVE AND OBJECTIVE BOX
THE PATIENT WAS SEEN AND EXAMINED BY ME WITH THE HOUSESTAFF AND STROKE TEAM DURING MORNING ROUNDS.   HPI:  59 year old RH F with a PMH of stroke? 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 LKN was 18:30 on 9/27.  Patient had previously taken aspirin, but does not remember why she decided to stop taking it. Patient does not have a neurologist. Patient ambulates with a cane due to right sided weakness/states she drifts to the right without a cane for the past 3 years. Patient stated that she gets physical therapy twice per week. NIHSS: 1 (right hand numbness) MRS: 2     SUBJECTIVE: No events overnight.  No new neurologic complaints.      aspirin  chewable 81 milliGRAM(s) Oral daily  atorvastatin 80 milliGRAM(s) Oral at bedtime  dextrose 40% Gel 15 Gram(s) Oral once PRN  dextrose 5%. 1000 milliLiter(s) IV Continuous <Continuous>  dextrose 50% Injectable 25 Gram(s) IV Push once  dextrose 50% Injectable 25 Gram(s) IV Push once  dextrose 50% Injectable 12.5 Gram(s) IV Push once  enoxaparin Injectable 30 milliGRAM(s) SubCutaneous daily  glucagon  Injectable 1 milliGRAM(s) IntraMuscular once PRN  insulin lispro (HumaLOG) corrective regimen sliding scale   SubCutaneous three times a day before meals  insulin lispro Injectable (HumaLOG) 3 Unit(s) SubCutaneous three times a day before meals    PHYSICAL EXAM:   Vital Signs Last 24 Hrs  T(C): 36.9 (30 Sep 2019 07:11), Max: 37.1 (29 Sep 2019 22:21)  T(F): 98.5 (30 Sep 2019 07:11), Max: 98.7 (29 Sep 2019 22:21)  HR: 80 (30 Sep 2019 07:11) (64 - 82)  BP: 125/84 (30 Sep 2019 07:11) (119/77 - 170/88)  RR: 20 (30 Sep 2019 07:11) (17 - 20)  SpO2: 96% (30 Sep 2019 07:11) (96% - 100%)    General: No acute distress  HEENT: EOM intact, visual fields full  Abdomen: Soft, nontender, nondistended   Extremities: No edema    NEUROLOGICAL EXAM:  Mental status: Awake, eating lunch in the chair, following commands, IDs objects   Cranial Nerves: No facial asymmetry, no nystagmus, no dysarthria,  tongue midline  Motor exam: Normal tone, right upper extremities paresthesias, full strength throughout    Sensation: decreased on right side   Coordination/ Gait: No dysmetria, gait deferred.     LABS:                        12.1   7.9   )-----------( 277      ( 28 Sep 2019 14:02 )             38.4    09-30    135  |  110<H>  |  55<H>  ----------------------------<  153<H>  5.5<H>   |  13<L>  |  1.80<H>    Ca    10.4      30 Sep 2019 06:30  Phos  3.8     09-28  Mg     1.9     09-28    TPro  7.9  /  Alb  4.1  /  TBili  0.3  /  DBili  x   /  AST  9<L>  /  ALT  6<L>  /  AlkPhos  95  09-28  PT/INR - ( 28 Sep 2019 14:02 )   PT: 9.7 sec;   INR: 0.85 ratio         PTT - ( 28 Sep 2019 14:02 )  PTT:36.4 sec  Hemoglobin A1C, Whole Blood: 9.4 % (09-29 @ 09:35)      IMAGING: Reviewed by me.   MRI/MR Angio Head Neck No Cont (09.29.19)  Acute infarct left thalamus new since 8/19/2016. Small vessel   white matter ischemic changes. Rhiannon there is multifocal narrowing of the   distal anterior middle and posterior cerebral arteries bilaterally which   are new in the interval. This is suspicious for vasculitis or vasospasm   confirmed with conventional angiography.     CT Head No Cont (09.28.19)  No CT evidence of acute intracranial hemorrhage, extra-axial collection,   or mass effect. THE PATIENT WAS SEEN AND EXAMINED BY ME WITH THE HOUSESTAFF AND STROKE TEAM DURING MORNING ROUNDS.   HPI:  59 year old RH F with a PMH of stroke? 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 LKN was 18:30 on 9/27.  Patient had previously taken aspirin, but does not remember why she decided to stop taking it. Patient does not have a neurologist. Patient ambulates with a cane due to right sided weakness/states she drifts to the right without a cane for the past 3 years. Patient stated that she gets physical therapy twice per week. NIHSS: 1 (right hand numbness) MRS: 2     SUBJECTIVE: Continued right face and body numbness.      aspirin  chewable 81 milliGRAM(s) Oral daily  atorvastatin 80 milliGRAM(s) Oral at bedtime  dextrose 40% Gel 15 Gram(s) Oral once PRN  dextrose 5%. 1000 milliLiter(s) IV Continuous <Continuous>  dextrose 50% Injectable 25 Gram(s) IV Push once  dextrose 50% Injectable 25 Gram(s) IV Push once  dextrose 50% Injectable 12.5 Gram(s) IV Push once  enoxaparin Injectable 30 milliGRAM(s) SubCutaneous daily  glucagon  Injectable 1 milliGRAM(s) IntraMuscular once PRN  insulin lispro (HumaLOG) corrective regimen sliding scale   SubCutaneous three times a day before meals  insulin lispro Injectable (HumaLOG) 3 Unit(s) SubCutaneous three times a day before meals    PHYSICAL EXAM:   Vital Signs Last 24 Hrs  T(C): 36.9 (30 Sep 2019 07:11), Max: 37.1 (29 Sep 2019 22:21)  T(F): 98.5 (30 Sep 2019 07:11), Max: 98.7 (29 Sep 2019 22:21)  HR: 80 (30 Sep 2019 07:11) (64 - 82)  BP: 125/84 (30 Sep 2019 07:11) (119/77 - 170/88)  RR: 20 (30 Sep 2019 07:11) (17 - 20)  SpO2: 96% (30 Sep 2019 07:11) (96% - 100%)    General: No acute distress  HEENT: EOM intact, visual fields full  Abdomen: Soft, nontender, nondistended   Extremities: No edema    NEUROLOGICAL EXAM:  Mental status: Awake, alert, interactive, oriented to person, place, time follows commands   Cranial Nerves: No facial asymmetry, no nystagmus, no dysarthria,  tongue midline  Motor exam: Normal tone, moves all extremities well against gravity   Sensation: decreased to temperature on right side   Coordination/ Gait: No dysmetria, gait deferred.     LABS:                        12.1   7.9   )-----------( 277      ( 28 Sep 2019 14:02 )             38.4    09-30    135  |  110<H>  |  55<H>  ----------------------------<  153<H>  5.5<H>   |  13<L>  |  1.80<H>    Ca    10.4      30 Sep 2019 06:30  Phos  3.8     09-28  Mg     1.9     09-28    TPro  7.9  /  Alb  4.1  /  TBili  0.3  /  DBili  x   /  AST  9<L>  /  ALT  6<L>  /  AlkPhos  95  09-28  PT/INR - ( 28 Sep 2019 14:02 )   PT: 9.7 sec;   INR: 0.85 ratio         PTT - ( 28 Sep 2019 14:02 )  PTT:36.4 sec  Hemoglobin A1C, Whole Blood: 9.4 % (09-29 @ 09:35)      IMAGING: Reviewed by me.   MRI/MR Angio Head Neck No Cont (09.29.19)  Acute infarct left thalamus new since 8/19/2016. Small vessel   white matter ischemic changes. Rhiannon there is multifocal narrowing of the   distal anterior middle and posterior cerebral arteries bilaterally which   are new in the interval. This is suspicious for vasculitis or vasospasm   confirmed with conventional angiography.     CT Head No Cont (09.28.19)  No CT evidence of acute intracranial hemorrhage, extra-axial collection,   or mass effect.

## 2019-10-02 LAB
ALBUMIN SERPL ELPH-MCNC: 3.8 G/DL — SIGNIFICANT CHANGE UP (ref 3.3–5)
ALP SERPL-CCNC: 93 U/L — SIGNIFICANT CHANGE UP (ref 40–120)
ALT FLD-CCNC: 12 U/L — SIGNIFICANT CHANGE UP (ref 10–45)
ANION GAP SERPL CALC-SCNC: 12 MMOL/L — SIGNIFICANT CHANGE UP (ref 5–17)
APPEARANCE UR: ABNORMAL
AST SERPL-CCNC: 16 U/L — SIGNIFICANT CHANGE UP (ref 10–40)
BILIRUB SERPL-MCNC: 0.4 MG/DL — SIGNIFICANT CHANGE UP (ref 0.2–1.2)
BILIRUB UR-MCNC: NEGATIVE — SIGNIFICANT CHANGE UP
BUN SERPL-MCNC: 77 MG/DL — HIGH (ref 7–23)
CALCIUM SERPL-MCNC: 9.6 MG/DL — SIGNIFICANT CHANGE UP (ref 8.4–10.5)
CHLORIDE SERPL-SCNC: 105 MMOL/L — SIGNIFICANT CHANGE UP (ref 96–108)
CK SERPL-CCNC: 57 U/L — SIGNIFICANT CHANGE UP (ref 25–170)
CO2 SERPL-SCNC: 18 MMOL/L — LOW (ref 22–31)
COLOR SPEC: SIGNIFICANT CHANGE UP
CREAT SERPL-MCNC: 2.13 MG/DL — HIGH (ref 0.5–1.3)
DIFF PNL FLD: NEGATIVE — SIGNIFICANT CHANGE UP
DRUG SCREEN, SERUM: SIGNIFICANT CHANGE UP
GAMMA INTERFERON BACKGROUND BLD IA-ACNC: 0.01 IU/ML — SIGNIFICANT CHANGE UP
GLUCOSE BLDC GLUCOMTR-MCNC: 106 MG/DL — HIGH (ref 70–99)
GLUCOSE BLDC GLUCOMTR-MCNC: 122 MG/DL — HIGH (ref 70–99)
GLUCOSE BLDC GLUCOMTR-MCNC: 173 MG/DL — HIGH (ref 70–99)
GLUCOSE BLDC GLUCOMTR-MCNC: 84 MG/DL — SIGNIFICANT CHANGE UP (ref 70–99)
GLUCOSE SERPL-MCNC: 154 MG/DL — HIGH (ref 70–99)
GLUCOSE UR QL: NEGATIVE — SIGNIFICANT CHANGE UP
HCT VFR BLD CALC: 34.9 % — SIGNIFICANT CHANGE UP (ref 34.5–45)
HGB BLD-MCNC: 10.9 G/DL — LOW (ref 11.5–15.5)
KETONES UR-MCNC: NEGATIVE — SIGNIFICANT CHANGE UP
LEUKOCYTE ESTERASE UR-ACNC: ABNORMAL
M TB IFN-G BLD-IMP: NEGATIVE — SIGNIFICANT CHANGE UP
M TB IFN-G CD4+ BCKGRND COR BLD-ACNC: 0.01 IU/ML — SIGNIFICANT CHANGE UP
M TB IFN-G CD4+CD8+ BCKGRND COR BLD-ACNC: 0.01 IU/ML — SIGNIFICANT CHANGE UP
MCHC RBC-ENTMCNC: 26.1 PG — LOW (ref 27–34)
MCHC RBC-ENTMCNC: 31.2 GM/DL — LOW (ref 32–36)
MCV RBC AUTO: 83.5 FL — SIGNIFICANT CHANGE UP (ref 80–100)
MYELOPEROXIDASE AB SER-ACNC: <5 UNITS — SIGNIFICANT CHANGE UP
MYELOPEROXIDASE CELLS FLD QL: NEGATIVE — SIGNIFICANT CHANGE UP
NITRITE UR-MCNC: NEGATIVE — SIGNIFICANT CHANGE UP
PH UR: 5.5 — SIGNIFICANT CHANGE UP (ref 5–8)
PLATELET # BLD AUTO: 267 K/UL — SIGNIFICANT CHANGE UP (ref 150–400)
POTASSIUM SERPL-MCNC: 4.9 MMOL/L — SIGNIFICANT CHANGE UP (ref 3.5–5.3)
POTASSIUM SERPL-SCNC: 4.9 MMOL/L — SIGNIFICANT CHANGE UP (ref 3.5–5.3)
PROT SERPL-MCNC: 7 G/DL — SIGNIFICANT CHANGE UP (ref 6–8.3)
PROT UR-MCNC: ABNORMAL
PROTEINASE3 AB FLD-ACNC: <5 UNITS — SIGNIFICANT CHANGE UP
PROTEINASE3 AB SER-ACNC: NEGATIVE — SIGNIFICANT CHANGE UP
QUANT TB PLUS MITOGEN MINUS NIL: 3.9 IU/ML — SIGNIFICANT CHANGE UP
RBC # BLD: 4.18 M/UL — SIGNIFICANT CHANGE UP (ref 3.8–5.2)
RBC # FLD: 13.2 % — SIGNIFICANT CHANGE UP (ref 10.3–14.5)
SODIUM SERPL-SCNC: 135 MMOL/L — SIGNIFICANT CHANGE UP (ref 135–145)
SP GR SPEC: 1.01 — SIGNIFICANT CHANGE UP (ref 1.01–1.02)
UROBILINOGEN FLD QL: SIGNIFICANT CHANGE UP
WBC # BLD: 5.94 K/UL — SIGNIFICANT CHANGE UP (ref 3.8–10.5)
WBC # FLD AUTO: 5.94 K/UL — SIGNIFICANT CHANGE UP (ref 3.8–10.5)

## 2019-10-02 PROCEDURE — 76770 US EXAM ABDO BACK WALL COMP: CPT | Mod: 26

## 2019-10-02 PROCEDURE — 99232 SBSQ HOSP IP/OBS MODERATE 35: CPT | Mod: GC

## 2019-10-02 PROCEDURE — 93306 TTE W/DOPPLER COMPLETE: CPT | Mod: 26

## 2019-10-02 RX ORDER — CLOPIDOGREL BISULFATE 75 MG/1
75 TABLET, FILM COATED ORAL DAILY
Refills: 0 | Status: DISCONTINUED | OUTPATIENT
Start: 2019-10-02 | End: 2019-10-04

## 2019-10-02 RX ORDER — SODIUM CHLORIDE 9 MG/ML
1000 INJECTION INTRAMUSCULAR; INTRAVENOUS; SUBCUTANEOUS
Refills: 0 | Status: DISCONTINUED | OUTPATIENT
Start: 2019-10-02 | End: 2019-10-04

## 2019-10-02 RX ADMIN — Medication 3 UNIT(S): at 13:03

## 2019-10-02 RX ADMIN — Medication 3 UNIT(S): at 18:11

## 2019-10-02 RX ADMIN — ATORVASTATIN CALCIUM 80 MILLIGRAM(S): 80 TABLET, FILM COATED ORAL at 21:38

## 2019-10-02 RX ADMIN — Medication 81 MILLIGRAM(S): at 13:03

## 2019-10-02 RX ADMIN — Medication 1: at 09:02

## 2019-10-02 RX ADMIN — Medication 3 UNIT(S): at 09:02

## 2019-10-02 RX ADMIN — ENOXAPARIN SODIUM 30 MILLIGRAM(S): 100 INJECTION SUBCUTANEOUS at 13:03

## 2019-10-02 RX ADMIN — CLOPIDOGREL BISULFATE 75 MILLIGRAM(S): 75 TABLET, FILM COATED ORAL at 13:10

## 2019-10-02 NOTE — PROGRESS NOTE ADULT - SUBJECTIVE AND OBJECTIVE BOX
[facility]   [phone]  RENAL FOLLOW UP NOTE  --------------------------------------------------------------------------------  Chief Complaint:    24 hour events/subjective:    seen earlier. no new c/o     PAST HISTORY  --------------------------------------------------------------------------------  No significant changes to PMH, PSH, FHx, SHx, unless otherwise noted    ALLERGIES & MEDICATIONS  --------------------------------------------------------------------------------  Allergies    No Known Allergies    Intolerances      Standing Inpatient Medications  aspirin  chewable 81 milliGRAM(s) Oral daily  atorvastatin 80 milliGRAM(s) Oral at bedtime  clopidogrel Tablet 75 milliGRAM(s) Oral daily  dextrose 5%. 1000 milliLiter(s) IV Continuous <Continuous>  dextrose 50% Injectable 25 Gram(s) IV Push once  dextrose 50% Injectable 25 Gram(s) IV Push once  dextrose 50% Injectable 12.5 Gram(s) IV Push once  enoxaparin Injectable 30 milliGRAM(s) SubCutaneous daily  insulin lispro (HumaLOG) corrective regimen sliding scale   SubCutaneous three times a day before meals  insulin lispro Injectable (HumaLOG) 3 Unit(s) SubCutaneous three times a day before meals  sodium chloride 0.9%. 1000 milliLiter(s) IV Continuous <Continuous>    PRN Inpatient Medications  dextrose 40% Gel 15 Gram(s) Oral once PRN  glucagon  Injectable 1 milliGRAM(s) IntraMuscular once PRN      REVIEW OF SYSTEMS  --------------------------------------------------------------------------------    Gen: denies fevers/chills,  CVS: denies chest pain/palpitations  Resp: denies SOB/Cough  GI: Denies N/V/Abd pain  : Denies dysuria/oliguria/hematuria    All other systems were reviewed and are negative, except as noted.    VITALS/PHYSICAL EXAM  --------------------------------------------------------------------------------  T(C): 36.9 (10-02-19 @ 19:45), Max: 37.1 (10-02-19 @ 11:31)  HR: 93 (10-02-19 @ 19:45) (74 - 93)  BP: 158/94 (10-02-19 @ 19:45) (119/80 - 158/94)  RR: 18 (10-02-19 @ 19:45) (18 - 18)  SpO2: 97% (10-02-19 @ 19:45) (97% - 100%)  Wt(kg): --        10-01-19 @ 07:01  -  10-02-19 @ 07:00  --------------------------------------------------------  IN: 580 mL / OUT: 0 mL / NET: 580 mL    10-02-19 @ 07:01  -  10-02-19 @ 20:51  --------------------------------------------------------  IN: 480 mL / OUT: 0 mL / NET: 480 mL      Physical Exam:  	  Gen: Non toxic comfortable appearing   	no jvd ,   	Pulm: CTA no rales or ronchi or wheezing  	CV: RRR, S1S2; no rub  	Abd: +BS, soft, nontender/nondistended  	: No suprapubic tenderness  	UE: Warm, no cyanosis  no clubbing,  no edema  	LE: Warm, no cyanosis  no clubbing, no edema  	Neuro: alert and oriented. speech coherent   	Skin: Warm, no decrease skin turgor   		      LABS/STUDIES  --------------------------------------------------------------------------------              10.9   5.94  >-----------<  267      [10-02-19 @ 12:10]              34.9     135  |  105  |  77  ----------------------------<  154      [10-02-19 @ 10:07]  4.9   |  18  |  2.13        Ca     9.6     [10-02-19 @ 10:07]    TPro  7.0  /  Alb  3.8  /  TBili  0.4  /  DBili  x   /  AST  16  /  ALT  12  /  AlkPhos  93  [10-02-19 @ 10:07]        CK 57      [10-02-19 @ 10:07]    Creatinine Trend:  SCr 2.13 [10-02 @ 10:07]  SCr 2.32 [10-01 @ 09:56]  SCr 1.93 [09-30 @ 12:03]  SCr 1.80 [09-30 @ 06:30]  SCr 1.74 [09-28 @ 14:02]              Urinalysis - [10-01-19 @ 19:02]      Color Light Yellow / Appearance Slightly Turbid / SG 1.015 / pH 5.5      Gluc Negative / Ketone Negative  / Bili Negative / Urobili <2 mg/dL       Blood Negative / Protein 100 mg/dL / Leuk Est Large / Nitrite Negative      RBC 4 /  / Hyaline 5 / Gran  / Sq Epi  / Non Sq Epi 4 / Bacteria Negative    Urine Creatinine 116      [10-01-19 @ 16:50]  Urine Protein 109      [10-01-19 @ 16:50]    HbA1c 9.6      [09-30-19 @ 08:11]  TSH 1.38      [09-28-19 @ 15:57]  Lipid: chol 384, , HDL 64,       [09-30-19 @ 09:19]    dsDNA <12      [09-30-19 @ 08:02]  C3 Complement 140      [10-01-19 @ 12:25]  C4 Complement 49      [10-01-19 @ 12:25]  ANCA: cANCA Negative, pANCA Negative, atypical ANCA Negative      [09-30-19 @ 08:02]  Syphilis Screen (Treponema Pallidum Ab) Negative      [10-01-19 @ 01:02]      < from: US Kidney and Bladder (10.02.19 @ 14:48) >  EXAM:  US KIDNEYS AND BLADDER                            PROCEDURE DATE:  10/02/2019            INTERPRETATION:  CLINICAL INFORMATION: AK I. Renal failure. Creatinine is   2.32    COMPARISON: None available.    TECHNIQUE: Sonography of the kidneys and bladder.     FINDINGS:    Right kidney:  10.6 cm. No renal mass, hydronephrosis or calculi.   Minimal/Mild increased parenchymal echogenicity.    Left kidney:  10.1 cm. No renal mass, hydronephrosis or calculi.   Minimal/mild increased parenchymalechogenicity.    Urinary bladder: Not completely distended.    IMPRESSION:     Cannot entirely exclude renal parenchymal disease. No hydronephrosis.                            SUYAPA HAYNES M.D., ATTENDING RADIOLOGIST  This document has been electronically signed. Oct  2 2019  4:08PM              < end of copied text >

## 2019-10-02 NOTE — PROGRESS NOTE ADULT - ASSESSMENT
59 year old RH F with a PMH of stroke? 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  rifts to the right without a cane for the past 3 years. Brain MRI revealed restricted diffusion at the left thalamus. Head and neck MRA with areas of significant constriction follow by dilation (beads in a string pattern) at  the anterior and posterior cerebral arteries concerning for vasculitis. now is being scheduled for neuro angio. pt with smooth vs borderline ckd IV  [ her weight is 118lb]     1- smooth   2- htn   3- uti  4- uncontrolled DM   5- proteinuria  subnephrotic         c3/c4/nusrat/c-anca/p-anca  neg so far. she is not anemic.  bladder scan, renal sono   gentle ivf to cont as cr improving   urinalysis  suggestive of infection to have urine culture   hyperglycemia control   she will stand at risk for worsening renal function including renal replacement therapy as well with iv contrast.   if neuro angio planned then to receive emma-procedure ivf hydration

## 2019-10-02 NOTE — PROGRESS NOTE ADULT - ASSESSMENT
ASSESSMENT: 59 year old RH F with a PMH of stroke? 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 LKN was 18:30 on 9/27.  Brain MRI revealed restricted diffusion at the left thalamus. Head and neck MRA with areas of significant constriction follow by dilation (beads in a string pattern) at  the anterior and posterior cerebral arteries noted to be concerning for vasculitis.    Impression: cerebral thrombosis with cerebral infarction etiology likely small vessel disease, clinically does not appear to be CNS vasculitis.     NEURO: neurologically without acute change, states symptoms are improving, continue close monitoring for neurologic deterioration, permissive HTN followed by gradual normotension as tolerated, titrate statin to LDL goal less than 70, MR imaging as noted, if able to tolerate at later time can consider repeat vessel imaging for further evaluation. Physical therapy/OT recommend AR.     ANTITHROMBOTIC THERAPY: ASA, add Plavix 3 weeks then resume asa alone there after.     PULMONARY:  protecting airway, saturating well on room air.     CARDIOVASCULAR:  TTE: ef 75-80%, minimal MR, mild stage 1 diastolic dysfunction, minimal TR, minimal NE, small pericardial effusion adjacent to right heart , cardiac monitoring without any recorded events.  Cardiology consult appreciated.               SBP goal: Permissive HTN to gradual normotension.     GASTROINTESTINAL:  dysphagia screen passed, tolerating diet      Diet: regular     RENAL: BUN/Cr elevated 77/2.13, CKD stage 3b, good urine output ,  hyperkalemia resolved,  continue to monitor.   Per Nephrology Dr. Nunez, prior to neuro angio perform IV hydration for renal protection, patient is high risk for worsening renal fxn including renal replacement therapy with IV contrast. BREANNE etiology unclear, bladder scan, renal US pending, gentle IVF, CPK, uA,       Na Goal: Greater than 135     Andrade: n    HEMATOLOGY: H/H without anemia, Platelets 244, no active bleeding noted      DVT ppx: LMWH    ID: afebrile, no leukocytosis , no signs or symptoms of infection.     OTHER:   Given that clinically there is a very low suspicion for CNS vasculitis, I do not believe that there is a role for further testing at this time.  Spoke with pts son and grandson at bedside previously, all questions addressed. Endocrine follow up for long term glycemic control.    DISPOSITION: Acute Rehab once stable and workup is complete      CORE MEASURES:        Admission NIHSS: 1     TPA: [] YES [x] NO      LDL/HDL: 211/49     Depression Screen: 0     Statin Therapy: y      Dysphagia Screen: [x] PASS [] FAIL     Smoking [] YES [x] NO      Afib [] YES [x] NO     Stroke Education [x] YES [] NO    Obtain screening lower extremity venous ultrasound in patients who meet 1 or more of the following criteria as patient is high risk for DVT/PE on admission:   [] History of DVT/PE  []Hypercoagulable states (Factor V Leiden, Cancer, OCP, etc. )  []Prolonged immobility (hemiplegia/hemiparesis/post operative or any other extended immobilization)  [] Transferred from outside facility (Rehab or Long term care)  [] Age </= to 50

## 2019-10-02 NOTE — PROGRESS NOTE ADULT - SUBJECTIVE AND OBJECTIVE BOX
Subjective: Patient seen and examined. No new events except as noted.     REVIEW OF SYSTEMS:    CONSTITUTIONAL: No weakness, fevers or chills  EYES/ENT: No visual changes;  No vertigo or throat pain   NECK: No pain or stiffness  RESPIRATORY: No cough, wheezing, hemoptysis; No shortness of breath  CARDIOVASCULAR: No chest pain or palpitations  GASTROINTESTINAL: No abdominal or epigastric pain. No nausea, vomiting, or hematemesis; No diarrhea or constipation. No melena or hematochezia.  GENITOURINARY: No dysuria, frequency or hematuria  NEUROLOGICAL: No numbness or weakness  SKIN: No itching, burning, rashes, or lesions   All other review of systems is negative unless indicated above.    MEDICATIONS:  MEDICATIONS  (STANDING):  aspirin  chewable 81 milliGRAM(s) Oral daily  atorvastatin 80 milliGRAM(s) Oral at bedtime  dextrose 5%. 1000 milliLiter(s) (50 mL/Hr) IV Continuous <Continuous>  dextrose 50% Injectable 25 Gram(s) IV Push once  dextrose 50% Injectable 25 Gram(s) IV Push once  dextrose 50% Injectable 12.5 Gram(s) IV Push once  enoxaparin Injectable 30 milliGRAM(s) SubCutaneous daily  insulin lispro (HumaLOG) corrective regimen sliding scale   SubCutaneous three times a day before meals  insulin lispro Injectable (HumaLOG) 3 Unit(s) SubCutaneous three times a day before meals  sodium chloride 0.9%. 1000 milliLiter(s) (50 mL/Hr) IV Continuous <Continuous>      PHYSICAL EXAM:  T(C): 36.8 (10-02-19 @ 04:38), Max: 36.9 (10-01-19 @ 19:19)  HR: 84 (10-02-19 @ 04:38) (74 - 91)  BP: 147/80 (10-02-19 @ 04:38) (120/82 - 148/82)  RR: 18 (10-02-19 @ 04:38) (17 - 18)  SpO2: 100% (10-02-19 @ 04:38) (99% - 100%)  Wt(kg): --  I&O's Summary    01 Oct 2019 07:01  -  02 Oct 2019 07:00  --------------------------------------------------------  IN: 580 mL / OUT: 0 mL / NET: 580 mL          Appearance: Normal	  HEENT:   Normal oral mucosa, PERRL, EOMI	  Lymphatic: No lymphadenopathy , no edema  Cardiovascular: Normal S1 S2, No JVD, No murmurs , Peripheral pulses palpable 2+ bilaterally  Respiratory: Lungs clear to auscultation, normal effort 	  Gastrointestinal:  Soft, Non-tender, + BS	  Skin: No rashes, No ecchymoses, No cyanosis, warm to touch  Musculoskeletal: Normal range of motion, normal strength  Psychiatry:  Mood & affect appropriate  Ext: No edema      LABS:    CARDIAC MARKERS:  CARDIAC MARKERS ( 02 Oct 2019 10:07 )  x     / x     / 57 U/L / x     / x                                    12.1   5.96  )-----------( 306      ( 01 Oct 2019 12:25 )             38.9     10-02    135  |  105  |  77<H>  ----------------------------<  154<H>  4.9   |  18<L>  |  2.13<H>    Ca    9.6      02 Oct 2019 10:07    TPro  7.0  /  Alb  3.8  /  TBili  0.4  /  DBili  x   /  AST  16  /  ALT  12  /  AlkPhos  93  10-02    proBNP:   Lipid Profile:   HgA1c:   TSH:             TELEMETRY: 	  SR  ECG:  	  RADIOLOGY:   DIAGNOSTIC TESTING:  [ ] Echocardiogram:  < from: Transthoracic Echocardiogram (10.02.19 @ 07:20) >    Patient name: DEBORAH NUNEZ  YOB: 1960   Age: 59 (F)   MR#: [id number]  Study Date: 10/2/2019  [facility]  2nd Sonographer: Chioma Gonsales RDCS  Study quality: Technically fair  Referring Physician: Vita Miller MD  Blood Pressure: 125/84 mmHg  Height: 157 cm  Weight: 54 kg  BSA: 1.5 m2  ------------------------------------------------------------------------  PROCEDURE: Transthoracic echocardiogram with 2-D, M-Mode  and complete spectral and color flow Doppler.  INDICATION: Cerebral infarction, unspecified (I63.9)  ------------------------------------------------------------------------  Dimensions:    Normal Values:  LA:     3.0    2.0 - 4.0 cm  Ao:     2.4    2.0 - 3.8 cm  SEPTUM: 1.6    0.6 - 1.2 cm  PWT:    1.3    0.6 - 1.1 cm  LVIDd:  3.4    3.0 - 5.6 cm  LVIDs:  2.0    1.8 - 4.0 cm  Derived variables:  LVMI: 115 g/m2  RWT: 0.76  Fractional short: 41 %  EF (Visual Estimate): 75-80 %  Doppler Peak Velocity (m/sec): AoV=1.1  ------------------------------------------------------------------------  Observations:  Mitral Valve: Normal mitral valve. Minimal mitral  regurgitation.  Aortic Valve/Aorta: Normal trileaflet aortic valve. Peak  transaortic valve gradient equals 5 mm Hg. No aortic valve  regurgitation seen. Intracavitary gradient of 8 mmHg.  Aortic Root: 2.4 cm.  Left Atrium: Normal left atrium.  LA volume index = 24  cc/m2.  Left Ventricle: Hyperdynamic left ventricular systolic  function. Severe concentric left ventricular hypertrophy.  Mild diastolic dysfunction (Stage I).  Right Heart: Normal right atrium. Normal right ventricular  size and function. Normal tricuspid valve. Minimal  tricuspid regurgitation. Normal pulmonic valve. Minimal  pulmonic regurgitation.  Pericardium/Pleura: Small pericardial effusion adjacent to  the right heart.  Hemodynamic: Estimated right atrial pressure equals 3 mmHg.  Inadequate tricuspid regurgitation Doppler envelope  precludes estimation of RVSP.  ------------------------------------------------------------------------  Conclusions:  1. Normal mitral valve. Minimal mitral regurgitation.  2. Normal trileaflet aortic valve. No aortic valve  regurgitation seen.  3. Severe concentric left ventricular hypertrophy.  4. Hyperdynamic left ventricular systolic function.  5. Mild diastolic dysfunction (Stage I).  6. Normal right ventricular size and function.  7. Small pericardial effusion adjacent to the right heart.  *** No previous Echo exam.  ------------------------------------------------------------------------  Confirmed on  10/2/2019 - 10:16:17 by Jorge Auguste M.D.  ------------------------------------------------------------------------    < end of copied text >    [ ]  Catheterization:  [ ] Stress Test:    OTHER:

## 2019-10-02 NOTE — PROGRESS NOTE ADULT - SUBJECTIVE AND OBJECTIVE BOX
60 yo RHD F with a PMH of stroke 3 years ago with residual right sided weakness, HTN, T2DM (with retinopathy?), glaucoma? with right eye blindness, and hearing loss (has hearing aids) presented to [facility] with right hand weakness/numbness and right mouth numbness. As per patient and son at bedside she was eating when she developed right hand numbness. She denies any fall or trauma. The following day son drove patient to [facility]. NIHSS was 1. CT head was negative for any acute intracranial pathology. MRI head showed an acute infarct of left thalamus. Patient was admitted to the stroke unit for further evaluation. MRA showed suspicion of vasculitis or vasospasm for which cardiology following.   At baseline, patient ambulated with a SAC since her prior CVA three years ago as she drifts to the  right when walking. She does require some assistance with bathing and dressing, provided by her sons. Patient lives in an apartment with her sons in [facility] events: Rheumatology following for concerns of vasculitis but workup so far is unrevealing. Nephrology following for concerns of kidney disease.  Per Neuro, etiology of stroke likely small vessel disease vs CNS vasculitis       FUNCTIONAL PROGRESS  Speech therapy: Pt presents with mild cognitive-linguistic deficits with impairments in short term memory, reasoning, verbal problem solving and cognitive flexibility. Patient appeared to benefit from repetition for explanation of tasks.  Currently Min Assist Level with Bed Mobility, Transfers, Ambulation, and ADLs    REVIEW OF SYSTEMS  Constitutional - No fever,  No fatigue  HEENT - No vertigo, No neck pain  Neurological - No headaches, No memory loss, No loss of strength, No numbness, No tremors  Skin - No rashes, No lesions   Musculoskeletal - No joint pain, No joint swelling, No muscle pain  Psychiatric - No depression, No anxiety    VITALS  T(C): 37.1 (10-02-19 @ 11:31), Max: 37.1 (10-02-19 @ 11:31)  HR: 87 (10-02-19 @ :31) (74 - 87)  BP: 119/80 (10-02-19 @ 11:31) (119/80 - 148/82)  RR: 18 (10-02-19 @ :31) (17 - 18)  SpO2: 100% (10-02-19 @ 11:31) (99% - 100%)  Wt(kg): --    MEDICATIONS   aspirin  chewable 81 milliGRAM(s) daily  atorvastatin 80 milliGRAM(s) at bedtime  clopidogrel Tablet 75 milliGRAM(s) daily  dextrose 40% Gel 15 Gram(s) once PRN  dextrose 5%. 1000 milliLiter(s) <Continuous>  dextrose 50% Injectable 25 Gram(s) once  dextrose 50% Injectable 25 Gram(s) once  dextrose 50% Injectable 12.5 Gram(s) once  enoxaparin Injectable 30 milliGRAM(s) daily  glucagon  Injectable 1 milliGRAM(s) once PRN  insulin lispro (HumaLOG) corrective regimen sliding scale   three times a day before meals  insulin lispro Injectable (HumaLOG) 3 Unit(s) three times a day before meals  sodium chloride 0.9%. 1000 milliLiter(s) <Continuous>      RECENT LABS/IMAGING  CBC Full  -  ( 02 Oct 2019 12:10 )  WBC Count : 5.94 K/uL  RBC Count : 4.18 M/uL  Hemoglobin : 10.9 g/dL  Hematocrit : 34.9 %  Platelet Count - Automated : 267 K/uL  Mean Cell Volume : 83.5 fl  Mean Cell Hemoglobin : 26.1 pg  Mean Cell Hemoglobin Concentration : 31.2 gm/dL  Auto Neutrophil # : x  Auto Lymphocyte # : x  Auto Monocyte # : x  Auto Eosinophil # : x  Auto Basophil # : x  Auto Neutrophil % : x  Auto Lymphocyte % : x  Auto Monocyte % : x  Auto Eosinophil % : x  Auto Basophil % : x    10-02    135  |  105  |  77<H>  ----------------------------<  154<H>  4.9   |  18<L>  |  2.13<H>    Ca    9.6      02 Oct 2019 10:07    TPro  7.0  /  Alb  3.8  /  TBili  0.4  /  DBili  x   /  AST  16  /  ALT  12  /  AlkPhos  93  10-02    Urinalysis Basic - ( 01 Oct 2019 19:02 )    Color: Light Yellow / Appearance: Slightly Turbid / S.015 / pH: x  Gluc: x / Ketone: Negative  / Bili: Negative / Urobili: <2 mg/dL   Blood: x / Protein: 100 mg/dL / Nitrite: Negative   Leuk Esterase: Large / RBC: 4 /HPF /  /HPF   Sq Epi: x / Non Sq Epi: 4 /HPF / Bacteria: Negative        ---------  PHYSICAL EXAM  Constitutional - NAD, Comfortable, sitting in bed  HEENT - NCAT, EOMI  Neck - Supple, No limited ROM  Chest - CTA bilaterally, No wheezing  Cardiovascular - RRR, S1S2,   Abdomen - BS+, Soft, NT ND  Extremities - No C/C/E, No calf tenderness   Neurologic Exam -                    Cognitive - AAOx3, NAD, follows commands appropriately     Communication - Fluent, + dysarthria     Cranial Nerves - decreased facial sensation on right, tongue midline, no ptosis, no facial droop, shoulder shrug intact     Motor - poor patient effort                    LEFT    UE - ShAB 4/5, EF 4/5, EE 4/5, WE 4/5,  4/5                    RIGHT UE - ShAB 4/5, EF 4/5, EE 4/5, WE 4/5,  4/5                    LEFT    LE - HF 4/5, KE 5/5, DF 5/5, PF 5/5                    RIGHT LE - HF 4/5, KE 5/5, DF 5/5, PF 5/5        Sensory - decreased on right side     Reflexes - DTR Intact and equal bilateral     Coordination - FTN intact bilaterally     Rapid alternating movements intact bilaterally      OculoVestibular - No saccades, No nystagmus,   Psychiatric - Mood stable, Affect WNL    ASSESSMENT    59y Female with functional deficits after acute left thalamic infarct likely 2/2 small vessel disease vs CNS vasculitis     Acute left thalamic infarct - management as per neurology, aspirin/plavix for three weeks then resume ASA thereafter   DM2 - insulin, sliding scale  HLD - atorvastatin  Pain - Tylenol  DVT PPX - SCDs, Lovenox  Diet - Regular  Rehab -   Continue bedside therapy as well as OOB throughout the day with mobilization by staff to maintain cardiopulmonary function and prevention of secondary complications related to debility.   PT- ROM, Bed Mobility, transfers, amb w AD, GCEs  OT- ADLs, energy conservation  SLP- Cognitive/linguistic deficits. Dysphagia eval and tx    Recommend ACUTE inpatient rehabilitation for the functional deficits consisting of 3 hours of therapy/day & 24 hour RN/daily PMR physician for comorbid medical management. Patient will be able to tolerate 3 hours a day.    Will continue to follow for ongoing rehab needs and recommendations 60 yo RHD F with a PMH of stroke 3 years ago with residual right sided weakness, HTN, T2DM (with retinopathy?), glaucoma? with right eye blindness, and hearing loss (has hearing aids) presented to [facility] with right hand weakness/numbness and right mouth numbness. As per patient and son at bedside she was eating when she developed right hand numbness. She denies any fall or trauma. The following day son drove patient to [facility]. NIHSS was 1. CT head was negative for any acute intracranial pathology. MRI head showed an acute infarct of left thalamus. Patient was admitted to the stroke unit for further evaluation. MRA showed suspicion of vasculitis or vasospasm for which cardiology following.   At baseline, patient ambulated with a SAC since her prior CVA three years ago as she drifts to the  right when walking. She does require some assistance with bathing and dressing, provided by her sons. Patient lives in an apartment with her sons in [facility] events: Rheumatology following for concerns of vasculitis but workup so far is unrevealing. Nephrology following for concerns of kidney disease.  Per Neuro, etiology of stroke likely small vessel disease vs CNS vasculitis       FUNCTIONAL PROGRESS  Speech therapy: Pt presents with mild cognitive-linguistic deficits with impairments in short term memory, reasoning, verbal problem solving and cognitive flexibility. Patient appeared to benefit from repetition for explanation of tasks.  Currently Min Assist Level with Bed Mobility, Transfers, Ambulation, and ADLs    REVIEW OF SYSTEMS  Constitutional - No fever,  No fatigue  HEENT - No vertigo, No neck pain  Neurological - difficulty writing with right hand, unsteady while walking   Skin - No rashes, No lesions   Musculoskeletal - No joint pain, No joint swelling, No muscle pain  Psychiatric - No depression, No anxiety    VITALS  T(C): 37.1 (10-02-19 @ 11:31), Max: 37.1 (10-02-19 @ 11:31)  HR: 87 (10-02-19 @ 11:31) (74 - 87)  BP: 119/80 (10-02-19 @ 11:31) (119/80 - 148/82)  RR: 18 (10-02-19 @ 11:31) (17 - 18)  SpO2: 100% (10-02-19 @ 11:31) (99% - 100%)  Wt(kg): --    MEDICATIONS   aspirin  chewable 81 milliGRAM(s) daily  atorvastatin 80 milliGRAM(s) at bedtime  clopidogrel Tablet 75 milliGRAM(s) daily  dextrose 40% Gel 15 Gram(s) once PRN  dextrose 5%. 1000 milliLiter(s) <Continuous>  dextrose 50% Injectable 25 Gram(s) once  dextrose 50% Injectable 25 Gram(s) once  dextrose 50% Injectable 12.5 Gram(s) once  enoxaparin Injectable 30 milliGRAM(s) daily  glucagon  Injectable 1 milliGRAM(s) once PRN  insulin lispro (HumaLOG) corrective regimen sliding scale   three times a day before meals  insulin lispro Injectable (HumaLOG) 3 Unit(s) three times a day before meals  sodium chloride 0.9%. 1000 milliLiter(s) <Continuous>      RECENT LABS/IMAGING  CBC Full  -  ( 02 Oct 2019 12:10 )  WBC Count : 5.94 K/uL  RBC Count : 4.18 M/uL  Hemoglobin : 10.9 g/dL  Hematocrit : 34.9 %  Platelet Count - Automated : 267 K/uL  Mean Cell Volume : 83.5 fl  Mean Cell Hemoglobin : 26.1 pg  Mean Cell Hemoglobin Concentration : 31.2 gm/dL  Auto Neutrophil # : x  Auto Lymphocyte # : x  Auto Monocyte # : x  Auto Eosinophil # : x  Auto Basophil # : x  Auto Neutrophil % : x  Auto Lymphocyte % : x  Auto Monocyte % : x  Auto Eosinophil % : x  Auto Basophil % : x    10-02    135  |  105  |  77<H>  ----------------------------<  154<H>  4.9   |  18<L>  |  2.13<H>    Ca    9.6      02 Oct 2019 10:07    TPro  7.0  /  Alb  3.8  /  TBili  0.4  /  DBili  x   /  AST  16  /  ALT  12  /  AlkPhos  93  10-02    Urinalysis Basic - ( 01 Oct 2019 19:02 )    Color: Light Yellow / Appearance: Slightly Turbid / S.015 / pH: x  Gluc: x / Ketone: Negative  / Bili: Negative / Urobili: <2 mg/dL   Blood: x / Protein: 100 mg/dL / Nitrite: Negative   Leuk Esterase: Large / RBC: 4 /HPF /  /HPF   Sq Epi: x / Non Sq Epi: 4 /HPF / Bacteria: Negative        ---------  PHYSICAL EXAM  Constitutional - NAD, Comfortable, sitting in bed  HEENT - NCAT, EOMI  Neck - Supple, No limited ROM  Chest - CTA bilaterally, No wheezing  Cardiovascular - RRR, S1S2,   Abdomen - BS+, Soft, NT ND  Extremities - No C/C/E, No calf tenderness   Neurologic Exam -                    Cognitive - AAOx3, NAD, follows commands appropriately     Communication - Fluent, + dysarthria     Cranial Nerves - decreased facial sensation on right, tongue midline, no ptosis, no facial droop, shoulder shrug intact     Motor - poor patient effort                    LEFT    UE - ShAB 4/5, EF 4/5, EE 4/5, WE 4/5,  4/5                    RIGHT UE - ShAB 4/5, EF 4/5, EE 4/5, WE 4/5,  4/5                    LEFT    LE - HF 4/5, KE 5/5, DF 5/5, PF 5/5                    RIGHT LE - HF 4/5, KE 5/5, DF 5/5, PF 5/5        Sensory - decreased on right side     Reflexes - DTR Intact and equal bilateral     Coordination - FTN intact bilaterally     Rapid alternating movements intact bilaterally      OculoVestibular - No saccades, No nystagmus,   Psychiatric - Mood stable, Affect WNL    ASSESSMENT    59y Female with functional deficits after acute left thalamic infarct likely 2/2 small vessel disease vs CNS vasculitis     Acute left thalamic infarct - management as per neurology, aspirin/plavix for three weeks then resume ASA thereafter   DM2 - insulin, sliding scale  HLD - atorvastatin  Pain - Tylenol  DVT PPX - SCDs, Lovenox  Diet - Regular  Rehab -   Continue bedside therapy as well as OOB throughout the day with mobilization by staff to maintain cardiopulmonary function and prevention of secondary complications related to debility.   PT- ROM, Bed Mobility, transfers, amb w AD, GCEs  OT- ADLs, energy conservation  SLP- Cognitive/linguistic deficits. Dysphagia eval and tx    Recommend ACUTE inpatient rehabilitation for the functional deficits consisting of 3 hours of therapy/day & 24 hour RN/daily PMR physician for comorbid medical management. Patient will be able to tolerate 3 hours a day.    Will continue to follow for ongoing rehab needs and recommendations

## 2019-10-02 NOTE — PROGRESS NOTE ADULT - SUBJECTIVE AND OBJECTIVE BOX
THE PATIENT WAS SEEN AND EXAMINED BY ME WITH THE HOUSESTAFF AND STROKE TEAM DURING MORNING ROUNDS.   HPI:  59 year old RH F with a PMH of stroke? 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 LKN was 18:30 on 9/27.  Patient had previously taken aspirin, but does not remember why she decided to stop taking it. Patient does not have a neurologist. Patient ambulates with a cane due to right sided weakness/states she drifts to the right without a cane for the past 3 years. Patient stated that she gets physical therapy twice per week. NIHSS: 1 (right hand numbness) MRS: 2     SUBJECTIVE: No events overnight.  No new neurologic complaints.  Numbness slightly improved.    aspirin  chewable 81 milliGRAM(s) Oral daily  atorvastatin 80 milliGRAM(s) Oral at bedtime  dextrose 40% Gel 15 Gram(s) Oral once PRN  dextrose 5%. 1000 milliLiter(s) IV Continuous <Continuous>  dextrose 50% Injectable 25 Gram(s) IV Push once  dextrose 50% Injectable 25 Gram(s) IV Push once  dextrose 50% Injectable 12.5 Gram(s) IV Push once  enoxaparin Injectable 30 milliGRAM(s) SubCutaneous daily  glucagon  Injectable 1 milliGRAM(s) IntraMuscular once PRN  insulin lispro (HumaLOG) corrective regimen sliding scale   SubCutaneous three times a day before meals  insulin lispro Injectable (HumaLOG) 3 Unit(s) SubCutaneous three times a day before meals  sodium chloride 0.9%. 1000 milliLiter(s) IV Continuous <Continuous>      PHYSICAL EXAM:   Vital Signs Last 24 Hrs  T(C): 36.8 (02 Oct 2019 04:38), Max: 36.9 (01 Oct 2019 19:19)  T(F): 98.2 (02 Oct 2019 04:38), Max: 98.4 (01 Oct 2019 19:19)  HR: 84 (02 Oct 2019 04:38) (74 - 91)  BP: 147/80 (02 Oct 2019 04:38) (120/82 - 148/82)  BP(mean): --  RR: 18 (02 Oct 2019 04:38) (17 - 18)  SpO2: 100% (02 Oct 2019 04:38) (99% - 100%)      General: No acute distress  HEENT: EOM intact, visual fields full  Abdomen: Soft, nontender, nondistended   Extremities: No edema    NEUROLOGICAL EXAM:  Mental status: Awake, alert, interactive, oriented to person, place, time follows commands   Cranial Nerves: No facial asymmetry, no nystagmus, no dysarthria,  tongue midline  Motor exam: Normal tone, moves all extremities well against gravity   Sensation: decreased to temperature on right side   Coordination/ Gait: No dysmetria, gait deferred.     LABS:                        12.1   5.96  )-----------( 306      ( 01 Oct 2019 12:25 )             38.9    10-02    135  |  105  |  77<H>  ----------------------------<  154<H>  4.9   |  18<L>  |  2.13<H>    Ca    9.6      02 Oct 2019 10:07    TPro  7.0  /  Alb  3.8  /  TBili  0.4  /  DBili  x   /  AST  16  /  ALT  12  /  AlkPhos  93  10-02    Hemoglobin A1C, Whole Blood: 9.6 % (09-30 @ 08:11)  Hemoglobin A1C, Whole Blood: 9.6 % (09-30 @ 08:11)  Hemoglobin A1C, Whole Blood: 9.4 % (09-29 @ 09:35)      IMAGING: Reviewed by me.     MRI/MR Angio Head Neck No Cont (09.29.19)  Acute infarct left thalamus new since 8/19/2016. Small vessel   white matter ischemic changes. Rhiannon there is multifocal narrowing of the   distal anterior middle and posterior cerebral arteries bilaterally which   are new in the interval. This is suspicious for vasculitis or vasospasm   confirmed with conventional angiography.     CT Head No Cont (09.28.19)  No CT evidence of acute intracranial hemorrhage, extra-axial collection,   or mass effect.

## 2019-10-02 NOTE — PROGRESS NOTE ADULT - ASSESSMENT
Assessment: 59 year old woman with HTN, HLD, LVH, uncontrolled IDDM (HbA1c- 9%) and prior CVA presents with acute L CVA    Plan of Care:    #Acute L thalamic CVA-  MRA suggestive of vasculitis.  No evidence of atrial ectopy on telemetry thus far.  Awaiting echocardiogram.   Would hold off on loop recorder unless vasculitis or small vessel disease is considered unlikely to be the cause of CVA.     #LVH-  Due to hypertensive disease.  BP acceptable at present.

## 2019-10-03 DIAGNOSIS — E78.5 HYPERLIPIDEMIA, UNSPECIFIED: ICD-10-CM

## 2019-10-03 DIAGNOSIS — I10 ESSENTIAL (PRIMARY) HYPERTENSION: ICD-10-CM

## 2019-10-03 DIAGNOSIS — E11.65 TYPE 2 DIABETES MELLITUS WITH HYPERGLYCEMIA: ICD-10-CM

## 2019-10-03 LAB
ANION GAP SERPL CALC-SCNC: 12 MMOL/L — SIGNIFICANT CHANGE UP (ref 5–17)
BUN SERPL-MCNC: 69 MG/DL — HIGH (ref 7–23)
CALCIUM SERPL-MCNC: 9.5 MG/DL — SIGNIFICANT CHANGE UP (ref 8.4–10.5)
CHLORIDE SERPL-SCNC: 112 MMOL/L — HIGH (ref 96–108)
CO2 SERPL-SCNC: 16 MMOL/L — LOW (ref 22–31)
CREAT SERPL-MCNC: 1.94 MG/DL — HIGH (ref 0.5–1.3)
GLUCOSE BLDC GLUCOMTR-MCNC: 103 MG/DL — HIGH (ref 70–99)
GLUCOSE BLDC GLUCOMTR-MCNC: 154 MG/DL — HIGH (ref 70–99)
GLUCOSE BLDC GLUCOMTR-MCNC: 155 MG/DL — HIGH (ref 70–99)
GLUCOSE BLDC GLUCOMTR-MCNC: 87 MG/DL — SIGNIFICANT CHANGE UP (ref 70–99)
GLUCOSE SERPL-MCNC: 181 MG/DL — HIGH (ref 70–99)
HCT VFR BLD CALC: 33.2 % — LOW (ref 34.5–45)
HGB BLD-MCNC: 10.3 G/DL — LOW (ref 11.5–15.5)
MCHC RBC-ENTMCNC: 26.3 PG — LOW (ref 27–34)
MCHC RBC-ENTMCNC: 31 GM/DL — LOW (ref 32–36)
MCV RBC AUTO: 84.7 FL — SIGNIFICANT CHANGE UP (ref 80–100)
PLATELET # BLD AUTO: 241 K/UL — SIGNIFICANT CHANGE UP (ref 150–400)
POTASSIUM SERPL-MCNC: 5 MMOL/L — SIGNIFICANT CHANGE UP (ref 3.5–5.3)
POTASSIUM SERPL-SCNC: 5 MMOL/L — SIGNIFICANT CHANGE UP (ref 3.5–5.3)
RBC # BLD: 3.92 M/UL — SIGNIFICANT CHANGE UP (ref 3.8–5.2)
RBC # FLD: 13.5 % — SIGNIFICANT CHANGE UP (ref 10.3–14.5)
SODIUM SERPL-SCNC: 140 MMOL/L — SIGNIFICANT CHANGE UP (ref 135–145)
WBC # BLD: 5.91 K/UL — SIGNIFICANT CHANGE UP (ref 3.8–10.5)
WBC # FLD AUTO: 5.91 K/UL — SIGNIFICANT CHANGE UP (ref 3.8–10.5)

## 2019-10-03 PROCEDURE — 99223 1ST HOSP IP/OBS HIGH 75: CPT

## 2019-10-03 PROCEDURE — 99231 SBSQ HOSP IP/OBS SF/LOW 25: CPT | Mod: GC

## 2019-10-03 RX ADMIN — ATORVASTATIN CALCIUM 80 MILLIGRAM(S): 80 TABLET, FILM COATED ORAL at 21:01

## 2019-10-03 RX ADMIN — Medication 3 UNIT(S): at 09:36

## 2019-10-03 RX ADMIN — ENOXAPARIN SODIUM 30 MILLIGRAM(S): 100 INJECTION SUBCUTANEOUS at 11:54

## 2019-10-03 RX ADMIN — Medication 1: at 13:21

## 2019-10-03 RX ADMIN — CLOPIDOGREL BISULFATE 75 MILLIGRAM(S): 75 TABLET, FILM COATED ORAL at 11:54

## 2019-10-03 RX ADMIN — Medication 1: at 09:36

## 2019-10-03 RX ADMIN — Medication 81 MILLIGRAM(S): at 11:54

## 2019-10-03 RX ADMIN — Medication 3 UNIT(S): at 18:03

## 2019-10-03 RX ADMIN — SODIUM CHLORIDE 50 MILLILITER(S): 9 INJECTION INTRAMUSCULAR; INTRAVENOUS; SUBCUTANEOUS at 18:00

## 2019-10-03 RX ADMIN — Medication 3 UNIT(S): at 13:21

## 2019-10-03 NOTE — CONSULT NOTE ADULT - CONSULT REQUESTED DATE/TIME
03-Oct-2019 15:32
03-Oct-2019 16:13
30-Sep-2019 11:00
30-Sep-2019 12:30
30-Sep-2019 23:34
30-Sep-2019
28-Sep-2019 18:13

## 2019-10-03 NOTE — PROGRESS NOTE ADULT - ASSESSMENT
Assessment: 59 year old woman with HTN, HLD, LVH, uncontrolled IDDM (HbA1c- 9%) and prior CVA presents with acute L CVA    Plan of Care:    #Acute L thalamic CVA  MRA suggestive of vasculitis.  No evidence of atrial ectopy on telemetry thus far.  Awaiting echocardiogram.   Would hold off on loop recorder unless vasculitis or small vessel disease is considered unlikely to be the cause of CVA.     #LVH-  Due to hypertensive disease.  BP acceptable at present.

## 2019-10-03 NOTE — PROGRESS NOTE ADULT - ASSESSMENT
59 year old RH F with a PMH of stroke? 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 LKN was 18:30 on 9/27.  Brain MRI revealed restricted diffusion at the left thalamus. Head and neck MRA with areas of significant constriction follow by dilation (beads in a string pattern) at  the anterior and posterior cerebral arteries noted to be concerning for vasculitis.    Impression: Cerebral thrombosis with cerebral infarction etiology likely small vessel disease, clinically does not appear to be CNS vasculitis.     NEURO: neurologically without acute change, states symptoms are improving, continue close monitoring for neurologic deterioration, permissive HTN followed by gradual normotension as tolerated, titrate statin to LDL goal less than 70, MR imaging as noted, if able to tolerate at later time can consider repeat vessel imaging for further evaluation. Physical therapy/OT recommend AR.     ANTITHROMBOTIC THERAPY: ASA, add Plavix 3 weeks then resume asa alone there after.     PULMONARY:  protecting airway, saturating well on room air.     CARDIOVASCULAR:  TTE: ef 75-80%, minimal MR, mild stage 1 diastolic dysfunction, minimal TR, minimal NM, small pericardial effusion adjacent to right heart , cardiac monitoring without any recorded events.  Cardiology consult appreciated.               SBP goal: Permissive HTN to gradual normotension.     GASTROINTESTINAL:  dysphagia screen passed, tolerating diet      Diet: regular     RENAL: CKD stage 3b, good urine output. Continue to monitor.  Per Nephrology Dr. Nunez, prior to neuro angio perform IV hydration for renal protection, patient is high risk for worsening renal fxn including renal replacement therapy with IV contrast. Renal US: Cannot entirely exclude renal parenchymal disease. No hydronephrosis.     Na Goal: Greater than 135     Andrade: n    HEMATOLOGY: no active bleeding noted      DVT ppx: LMWH    ID: afebrile, no signs or symptoms of infection.     OTHER:   Given that clinically there is a very low suspicion for CNS vasculitis, I do not believe that there is a role for further testing at this time. Spoke with pts son and grandson at bedside previously, all questions addressed. Endocrine follow up for long term glycemic control.    DISPOSITION: Acute Rehab once bed available.     CORE MEASURES:        Admission NIHSS: 1     TPA: [] YES [x] NO      LDL/HDL: 211/49     Depression Screen: 0     Statin Therapy: y      Dysphagia Screen: [x] PASS [] FAIL     Smoking [] YES [x] NO      Afib [] YES [x] NO     Stroke Education [x] YES [] NO    Obtain screening lower extremity venous ultrasound in patients who meet 1 or more of the following criteria as patient is high risk for DVT/PE on admission:   [] History of DVT/PE  []Hypercoagulable states (Factor V Leiden, Cancer, OCP, etc. )  []Prolonged immobility (hemiplegia/hemiparesis/post operative or any other extended immobilization)  [] Transferred from outside facility (Rehab or Long term care)  [] Age </= to 50 59 year old RH F with a PMH of stroke? 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 LKN was 18:30 on 9/27.  Brain MRI revealed restricted diffusion at the left thalamus. Head and neck MRA with areas of significant constriction follow by dilation (beads in a string pattern) at  the anterior and posterior cerebral arteries noted to be concerning for vasculopathy    Impression: Cerebral thrombosis with cerebral infarction etiology likely small vessel disease, clinically does not appear to be CNS vasculitis. Suspect atherosclerotic changes in the setting of poorly controlled vascular risk factors.    NEURO: neurologically without acute change, states symptoms are improving, continue close monitoring for neurologic deterioration, permissive HTN followed by gradual normotension as tolerated, titrate statin to LDL goal less than 70, MR imaging as noted, if able to tolerate at later time can consider repeat vessel imaging for further evaluation. Physical therapy/OT recommend AR.     ANTITHROMBOTIC THERAPY: ASA, add Plavix 3 weeks then resume asa alone there after.     PULMONARY:  protecting airway, saturating well on room air.     CARDIOVASCULAR:  TTE: ef 75-80%, minimal MR, mild stage 1 diastolic dysfunction, minimal TR, minimal ME, small pericardial effusion adjacent to right heart , cardiac monitoring without any recorded events.  Cardiology consult appreciated.               SBP goal: Permissive HTN to gradual normotension.     GASTROINTESTINAL:  dysphagia screen passed, tolerating diet      Diet: regular     RENAL: CKD stage 3b, good urine output. Continue to monitor.  Per Nephrology Dr. Nunez, prior to neuro angio perform IV hydration for renal protection, patient is high risk for worsening renal fxn including renal replacement therapy with IV contrast. Renal US: Cannot entirely exclude renal parenchymal disease. No hydronephrosis.     Na Goal: Greater than 135     Andrade: n    HEMATOLOGY: no active bleeding noted      DVT ppx: LMWH    ID: afebrile, no signs or symptoms of infection.     OTHER:   Given that clinically there is overall  low suspicion for CNS vasculitis, I do not believe that there is a role for further testing at this time. Spoke with pts son and grandson at bedside previously, all questions addressed. Endocrine follow up for long term glycemic control. Will arrange outpatient rheum follow-up on outpatient hospital follow-up.    DISPOSITION: Acute Rehab once bed available.     CORE MEASURES:        Admission NIHSS: 1     TPA: [] YES [x] NO      LDL/HDL: 211/49     Depression Screen: 0     Statin Therapy: y      Dysphagia Screen: [x] PASS [] FAIL     Smoking [] YES [x] NO      Afib [] YES [x] NO     Stroke Education [x] YES [] NO    Obtain screening lower extremity venous ultrasound in patients who meet 1 or more of the following criteria as patient is high risk for DVT/PE on admission:   [] History of DVT/PE  []Hypercoagulable states (Factor V Leiden, Cancer, OCP, etc. )  []Prolonged immobility (hemiplegia/hemiparesis/post operative or any other extended immobilization)  [] Transferred from outside facility (Rehab or Long term care)  [] Age </= to 50

## 2019-10-03 NOTE — PROGRESS NOTE ADULT - SUBJECTIVE AND OBJECTIVE BOX
Subjective: Patient seen and examined. No new events except as noted.   no cp or sob     REVIEW OF SYSTEMS:    CONSTITUTIONAL: + weakness, fevers or chills  EYES/ENT: No visual changes;  No vertigo or throat pain   NECK: No pain or stiffness  RESPIRATORY: No cough, wheezing, hemoptysis; No shortness of breath  CARDIOVASCULAR: No chest pain or palpitations  GASTROINTESTINAL: No abdominal or epigastric pain. No nausea, vomiting, or hematemesis; No diarrhea or constipation. No melena or hematochezia.  GENITOURINARY: No dysuria, frequency or hematuria  NEUROLOGICAL: No numbness or weakness  SKIN: No itching, burning, rashes, or lesions   All other review of systems is negative unless indicated above.    MEDICATIONS:  MEDICATIONS  (STANDING):  aspirin  chewable 81 milliGRAM(s) Oral daily  atorvastatin 80 milliGRAM(s) Oral at bedtime  clopidogrel Tablet 75 milliGRAM(s) Oral daily  dextrose 5%. 1000 milliLiter(s) (50 mL/Hr) IV Continuous <Continuous>  dextrose 50% Injectable 25 Gram(s) IV Push once  dextrose 50% Injectable 25 Gram(s) IV Push once  dextrose 50% Injectable 12.5 Gram(s) IV Push once  enoxaparin Injectable 30 milliGRAM(s) SubCutaneous daily  insulin lispro (HumaLOG) corrective regimen sliding scale   SubCutaneous three times a day before meals  insulin lispro Injectable (HumaLOG) 3 Unit(s) SubCutaneous three times a day before meals  sodium chloride 0.9%. 1000 milliLiter(s) (50 mL/Hr) IV Continuous <Continuous>      PHYSICAL EXAM:  T(C): 36.9 (10-03-19 @ 08:50), Max: 37.1 (10-02-19 @ 11:31)  HR: 97 (10-03-19 @ 08:50) (85 - 97)  BP: 102/67 (10-03-19 @ 08:50) (102/67 - 158/94)  RR: 17 (10-03-19 @ 08:50) (17 - 18)  SpO2: 100% (10-03-19 @ 08:50) (97% - 100%)  Wt(kg): --  I&O's Summary    02 Oct 2019 07:01  -  03 Oct 2019 07:00  --------------------------------------------------------  IN: 600 mL / OUT: 0 mL / NET: 600 mL            Appearance: NAD	  HEENT:   Normal oral mucosa, PERRL, EOMI	  Lymphatic: No lymphadenopathy , no edema  Cardiovascular: Normal S1 S2, No JVD, No murmurs , Peripheral pulses palpable 2+ bilaterally  Respiratory: Lungs clear to auscultation, normal effort 	  Gastrointestinal:  Soft, Non-tender, + BS	  Skin: No rashes, No ecchymoses, No cyanosis, warm to touch  Musculoskeletal: Normal range of motion, normal strength  Psychiatry:  Mood & affect appropriate  Ext: No edema  NEUROLOGICAL EXAM:  Mental status: Awake, alert, interactive, oriented to person, place, time follows commands   Cranial Nerves: No facial asymmetry, no nystagmus, no dysarthria,  tongue midline  Motor exam: Normal tone, moves all extremities well against gravity   Sensation: decreased to temperature on right side   Coordination/ Gait: No dysmetria, gait deferred.         LABS:    CARDIAC MARKERS:  CARDIAC MARKERS ( 02 Oct 2019 10:07 )  x     / x     / 57 U/L / x     / x                                    10.9   5.94  )-----------( 267      ( 02 Oct 2019 12:10 )             34.9     10-02    135  |  105  |  77<H>  ----------------------------<  154<H>  4.9   |  18<L>  |  2.13<H>    Ca    9.6      02 Oct 2019 10:07    TPro  7.0  /  Alb  3.8  /  TBili  0.4  /  DBili  x   /  AST  16  /  ALT  12  /  AlkPhos  93  10-02    proBNP:   Lipid Profile:   HgA1c:   TSH:               TELEMETRY: 	 SR   ECG:  	  RADIOLOGY:   < from: US Kidney and Bladder (10.02.19 @ 14:48) >    EXAM:  US KIDNEYS AND BLADDER                            PROCEDURE DATE:  10/02/2019            INTERPRETATION:  CLINICAL INFORMATION: AK I. Renal failure. Creatinine is   2.32    COMPARISON: None available.    TECHNIQUE: Sonography of the kidneys and bladder.     FINDINGS:    Right kidney:  10.6 cm. No renal mass, hydronephrosis or calculi.   Minimal/Mild increased parenchymal echogenicity.    Left kidney:  10.1 cm. No renal mass, hydronephrosis or calculi.   Minimal/mild increased parenchymalechogenicity.    Urinary bladder: Not completely distended.    IMPRESSION:     Cannot entirely exclude renal parenchymal disease. No hydronephrosis.                            SUYAPA HAYNES M.D., ATTENDING RADIOLOGIST  This document has been electronically signed. Oct  2 2019  4:08PM                < end of copied text >    DIAGNOSTIC TESTING:  [ ] Echocardiogram:  [ ]  Catheterization:  [ ] Stress Test:    OTHER:

## 2019-10-03 NOTE — PROGRESS NOTE ADULT - ASSESSMENT
60 yo RH F w/ PMH of stroke (3 yrs ago (presented w/ right hemiparesis/dysarthria)), HTN, T2DM (w/ ?retinopathy), glaucoma w/ R eye blindness, and hearing loss s/p hearing aids who presented w/ R hand and mouth numbness and R mouth numbness in setting of restricted diffusion in L thalamus w/ other findings of multifocal narrowing of the distal anterior middle and posterior cerebral arteries b/l. Ddx includes vasospasms, infection, reversible cerebral vasoconstriction syndromes, systemic vasculitides.      Recs:  - Would recommend further evaluation w/ temporal bx if pt redevelops symptoms or does not improve  - Could consider outpt PET/CT to evaluate for ongoing inflammation in the abnormal vessels   - Can follow up w/ Rheumatology outpt if pt desires    Case discussed w/ Dr. Rodriguez  Rheum will sign off 60 yo RH F w/ PMH of stroke (3 yrs ago (presented w/ right hemiparesis/dysarthria)), HTN, T2DM (w/ ?retinopathy), glaucoma w/ R eye blindness, and hearing loss s/p hearing aids who presented w/ R hand and mouth numbness and R mouth numbness in setting of restricted diffusion in L thalamus w/ other findings of multifocal narrowing of the distal anterior middle and posterior cerebral arteries b/l. Ddx includes vasospasm, infection, reversible cerebral vasoconstriction syndromes, systemic vasculitides.      Recs:  - Would recommend further evaluation w/ temporal bx if pt redevelops symptoms or does not improve  - Could consider outpt PET/CT to evaluate for ongoing inflammation in the abnormal vessels   - Can follow up w/ Rheumatology outpt if pt desires or abnormalities noted on PET/CT    Case discussed w/ Dr. Rodriguez  Rheum will sign off

## 2019-10-03 NOTE — PROGRESS NOTE ADULT - ATTENDING COMMENTS
Patient seen and examined at bedside. Agree with assessment and plan as stated above. Please call [phone] for any questions or concerns
Seen and examined with resident. Agree with note.   Patient with gait dysfunction.  Patient will need acute rehabilitation when stable.
Patient seen and examined at bedside. Agree with assessment and plan as stated above. Please call [phone] for any questions or concerns
agree with above; ROS otherwise negative

## 2019-10-03 NOTE — DISCHARGE NOTE PROVIDER - NSDCCPCAREPLAN_GEN_ALL_CORE_FT
PRINCIPAL DISCHARGE DIAGNOSIS  Diagnosis: Stroke  Assessment and Plan of Treatment: Please follow up with neurologist after being discharged from rehab. Continue taking medications as prescribed. Monitor your blood pressure. Reduce fat, cholesterol and salt in your diet. Increase intake of fruits and vegetables. Limit alcohol to minimum and do not smoke. You may be at risk for falling, make changes to your home to help you walk easier. Keep up to date on vaccinations.  If you experience any symptoms of facial drooping, slurred speech, arm or leg weakness, severe headache, vision changes or any worsening symptoms, notify provider immediatley and return to ER.

## 2019-10-03 NOTE — DISCHARGE NOTE PROVIDER - HOSPITAL COURSE
59 year old RH F with a PMH of stroke? 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 LKN was 18:30 on 9/27.  Brain MRI revealed restricted diffusion at the left thalamus. Head and neck MRA with areas of significant constriction follow by dilation (beads in a string pattern) at  the anterior and posterior cerebral arteries noted to be concerning for vasculitis.        Impression: Cerebral thrombosis with cerebral infarction etiology likely small vessel disease, clinically does not appear to be CNS vasculitis.         MRI/MR Angio Head Neck No Cont (09.29.19)    Acute infarct left thalamus new since 8/19/2016. Small vessel     white matter ischemic changes. [place] there is multifocal narrowing of the     distal anterior middle and posterior cerebral arteries bilaterally which     are new in the interval. This is suspicious for vasculitis or vasospasm     confirmed with conventional angiography.        CT Head No Cont (09.28.19)    No CT evidence of acute intracranial hemorrhage, extra-axial collection,     or mass effect.         Patient discharged on ASA/Plavix for 3 weeks and then ASA alone for stroke prevention, - started on high intenstiy statin 59 year old RH F with a PMH of stroke? 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 LKN was 18:30 on 9/27.  Brain MRI revealed restricted diffusion at the left thalamus. Head and neck MRA with areas of significant constriction follow by dilation (beads in a string pattern) at  the anterior and posterior cerebral arteries noted to be concerning for vasculitis.        Impression: Cerebral thrombosis with cerebral infarction etiology likely small vessel disease, clinically does not appear to be CNS vasculitis. Suspect atherosclerotic changes in the setting of poorly controlled vascular risk factors.        MRI/MR Angio Head Neck No Cont (09.29.19)    Acute infarct left thalamus new since 8/19/2016. Small vessel     white matter ischemic changes. [place] there is multifocal narrowing of the     distal anterior middle and posterior cerebral arteries bilaterally which     are new in the interval. This is suspicious for vasculitis or vasospasm     confirmed with conventional angiography.        CT Head No Cont (09.28.19)    No CT evidence of acute intracranial hemorrhage, extra-axial collection,     or mass effect.         Nephrology team consulted for CKD stage 3b, creatinine improved now 1.76,    Renal US: Cannot entirely exclude renal parenchymal disease. No hydronephrosis.        Endocrine consulted for HgA1c of 9.6: will be discharged on insulin and follow with endocrine, optho and podiatry as outpatient    Rheumatology consulted: plan for outpatient PET/CT to evaluate for ongoing inflammation in the abnormal vessels        Patient discharged on ASA/Plavix for 3 weeks and then ASA alone for stroke prevention, - started on high intenstiy statin        Patient evaluated by PT/OT/PMR and was recommended Acute Rehab. Patient stable for discharge.

## 2019-10-03 NOTE — CONSULT NOTE ADULT - SUBJECTIVE AND OBJECTIVE BOX
59 year old RH F with a PMH of stroke? 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 LKN was 18:30 on .  Patient had previously taken aspirin, but does not remember why she decided to stop taking it. Patient does not have a neurologist. Patient ambulates with a cane due to right sided weakness/states she drifts to the right without a cane for the past 3 years. Patient states that she gets physical therapy twice per week.           PAST MEDICAL & SURGICAL HISTORY:  CVA (cerebral vascular accident)  Hypertension  Diabetes mellitus  Diabetes  No significant past surgical history      Review of Systems:   CONSTITUTIONAL: No fever, weight loss, or fatigue  EYES: No eye pain, visual disturbances, or discharge  ENMT:  No difficulty hearing, tinnitus, vertigo; No sinus or throat pain  NECK: No pain or stiffness  BREASTS: No pain, masses, or nipple discharge  RESPIRATORY: No cough, wheezing, chills or hemoptysis; No shortness of breath  CARDIOVASCULAR: No chest pain, palpitations, dizziness, or leg swelling  GASTROINTESTINAL: No abdominal or epigastric pain. No nausea, vomiting, or hematemesis; No diarrhea or constipation. No melena or hematochezia.  GENITOURINARY: No dysuria, frequency, hematuria, or incontinence  NEUROLOGICAL: right side numbness  SKIN: No itching, burning, rashes, or lesions   LYMPH NODES: No enlarged glands  ENDOCRINE: No heat or cold intolerance; No hair loss  MUSCULOSKELETAL: No joint pain or swelling; No muscle, back, or extremity pain  PSYCHIATRIC: No depression, anxiety, mood swings, or difficulty sleeping  HEME/LYMPH: No easy bruising, or bleeding gums  ALLERY AND IMMUNOLOGIC: No hives or eczema    Allergies    No Known Allergies    Intolerances        Social History:     FAMILY HISTORY:  Family history of cerebrovascular accident (CVA)  No pertinent family history in first degree relatives      MEDICATIONS  (STANDING):  aspirin  chewable 81 milliGRAM(s) Oral daily  atorvastatin 80 milliGRAM(s) Oral at bedtime  clopidogrel Tablet 75 milliGRAM(s) Oral daily  dextrose 5%. 1000 milliLiter(s) (50 mL/Hr) IV Continuous <Continuous>  dextrose 50% Injectable 25 Gram(s) IV Push once  dextrose 50% Injectable 25 Gram(s) IV Push once  dextrose 50% Injectable 12.5 Gram(s) IV Push once  enoxaparin Injectable 30 milliGRAM(s) SubCutaneous daily  insulin lispro (HumaLOG) corrective regimen sliding scale   SubCutaneous three times a day before meals  insulin lispro Injectable (HumaLOG) 3 Unit(s) SubCutaneous three times a day before meals  sodium chloride 0.9%. 1000 milliLiter(s) (50 mL/Hr) IV Continuous <Continuous>    MEDICATIONS  (PRN):  dextrose 40% Gel 15 Gram(s) Oral once PRN Blood Glucose LESS THAN 70 milliGRAM(s)/deciliter  glucagon  Injectable 1 milliGRAM(s) IntraMuscular once PRN Glucose LESS THAN 70 milligrams/deciliter      Vital Signs Last 24 Hrs  T(C): 37.2 (03 Oct 2019 12:36), Max: 37.2 (03 Oct 2019 12:36)  T(F): 98.9 (03 Oct 2019 12:36), Max: 98.9 (03 Oct 2019 12:36)  HR: 80 (03 Oct 2019 12:36) (80 - 97)  BP: 146/87 (03 Oct 2019 12:36) (102/67 - 158/94)  BP(mean): --  RR: 17 (03 Oct 2019 12:36) (17 - 18)  SpO2: 100% (03 Oct 2019 12:36) (97% - 100%)  CAPILLARY BLOOD GLUCOSE      POCT Blood Glucose.: 155 mg/dL (03 Oct 2019 13:03)  POCT Blood Glucose.: 154 mg/dL (03 Oct 2019 09:29)  POCT Blood Glucose.: 122 mg/dL (02 Oct 2019 21:30)  POCT Blood Glucose.: 106 mg/dL (02 Oct 2019 17:30)    I&O's Summary    02 Oct 2019 07:01  -  03 Oct 2019 07:00  --------------------------------------------------------  IN: 600 mL / OUT: 0 mL / NET: 600 mL        PHYSICAL EXAM:  GENERAL: NAD, well-developed  HEAD:  Atraumatic, Normocephalic  EYES: EOMI, PERRLA, conjunctiva and sclera clear  NECK: Supple, No JVD  CHEST/LUNG: Clear to auscultation bilaterally; No wheeze  HEART: Regular rate and rhythm; No murmurs, rubs, or gallops  ABDOMEN: Soft, Nontender, Nondistended; Bowel sounds present  EXTREMITIES:  2+ Peripheral Pulses, No clubbing, cyanosis, or edema  PSYCH: AAOx3  NEUROLOGY: Mental status: Awake, alert, interactive, oriented to person, place, time follows commands   Cranial Nerves: No facial asymmetry, no nystagmus, no dysarthria,  tongue midline  Motor exam: Normal tone, moves all extremities well against gravity   Sensation: decreased to temperature on right side   Coordination/ Gait: No dysmetria, gait deferred.   SKIN: No rashes or lesions    LABS:                        10.3   5.91  )-----------( 241      ( 03 Oct 2019 13:56 )             33.2     10-    140  |  112<H>  |  69<H>  ----------------------------<  181<H>  5.0   |  16<L>  |  1.94<H>    Ca    9.5      03 Oct 2019 09:38    TPro  7.0  /  Alb  3.8  /  TBili  0.4  /  DBili  x   /  AST  16  /  ALT  12  /  AlkPhos  93  10-02      CARDIAC MARKERS ( 02 Oct 2019 10:07 )  x     / x     / 57 U/L / x     / x          Urinalysis Basic - ( 01 Oct 2019 19:02 )    Color: Light Yellow / Appearance: Slightly Turbid / S.015 / pH: x  Gluc: x / Ketone: Negative  / Bili: Negative / Urobili: <2 mg/dL   Blood: x / Protein: 100 mg/dL / Nitrite: Negative   Leuk Esterase: Large / RBC: 4 /HPF /  /HPF   Sq Epi: x / Non Sq Epi: 4 /HPF / Bacteria: Negative        RADIOLOGY & ADDITIONAL TESTS:    Imaging Personally Reviewed:    < from: MR Angio Neck No Cont (19 @ 09:40) >  Magnetic resonance imaging of the brain was carried out with transaxial   SPGR, FLAIR, fast spin echo T2 weighted images, axial susceptibility   weighted series, diffusion weighted series and sagittal T1 weighted   series on a 1.5 Sherrill magnet.    Comparison is made with the prior CT 2019 and MRI 2016.           fourth, third and lateral ventricles are normal in size and position.   There is no hemorrhage, mass or shift of the midline structures.   Scattered small vessel white matter ischemic changes are noted. There is   an acute infarct in the left thalamus erosion restriction. No acute   hemorrhage is identified. The sellar and parasellar structures are   unremarkable. The paranasal sinuses are clear.          Magnetic resonance angiography of the carotid and vertebral circulation   the neck was obtained using 2D time-of-flight technique with an   additional 3D time-of-flight acquisition through the carotid bifurcation.   The intracranial circulation centered the [place] was evaluated   using 3D time-of-flight technique.    The common, internal and external carotid arteries are unremarkable   bilaterally. There is no carotid stenosis. The right vertebral artery is   dominant.     Intracranially, petrous, cavernous and supraclinoid internal carotid   arteries are normal. There are multifocal areas of narrowing involving   the distal middle cerebral artery branches as well as the A2 branches in   the anterior interhemispheric fissure. There is also narrowing of the   distal posterior cerebral arteries. This is suspicious for vasculitis or   vasculopathy and is new when compared with 2016. This can be   confirmed with conventional angiography.    The distal vertebral arteries and basilar artery are normal.      IMPRESSION: Acute infarct left thalamus new since 2016. Small vessel   white matter ischemic changes. [place] there is multifocal narrowing of the   distal anterior middle and posterior cerebral arteries bilaterally which   are new in the interval. This is suspicious for vasculitis or vasospasm   confirmed with conventional angiography.    < end of copied text >    Consultant(s) Notes Reviewed:      Care Discussed with Consultants/Other Providers:

## 2019-10-03 NOTE — CONSULT NOTE ADULT - CONSULT REQUESTED BY NAME
Dr Edgar Miller
Dr. Edgar Miller
Dr. Hernández
Edgar Miller, Vita
Edgar Miller, Vita
On Behalf of Gilberto Hernandez
ED

## 2019-10-03 NOTE — DISCHARGE NOTE PROVIDER - CARE PROVIDERS DIRECT ADDRESSES
,DirectAddress_Unknown,DirectAddress_Unknown,DirectAddress_Unknown ,DirectAddress_Unknown,DirectAddress_Unknown,DirectAddress_Unknown,[email or web address]

## 2019-10-03 NOTE — PROGRESS NOTE ADULT - SUBJECTIVE AND OBJECTIVE BOX
THE PATIENT WAS SEEN AND EXAMINED BY ME WITH THE HOUSESTAFF AND STROKE TEAM DURING MORNING ROUNDS.   HPI:  59 year old RH F with a PMH of stroke? 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 LKN was 18:30 on 9/27.  Patient had previously taken aspirin, but does not remember why she decided to stop taking it. Patient does not have a neurologist. Patient ambulates with a cane due to right sided weakness/states she drifts to the right without a cane for the past 3 years. Patient stated that she gets physical therapy twice per week. NIHSS: 1 (right hand numbness) MRS: 2     SUBJECTIVE: No events overnight.  No new neurologic complaints.    aspirin  chewable 81 milliGRAM(s) Oral daily  atorvastatin 80 milliGRAM(s) Oral at bedtime  clopidogrel Tablet 75 milliGRAM(s) Oral daily  dextrose 40% Gel 15 Gram(s) Oral once PRN  dextrose 5%. 1000 milliLiter(s) IV Continuous <Continuous>  dextrose 50% Injectable 25 Gram(s) IV Push once  dextrose 50% Injectable 25 Gram(s) IV Push once  dextrose 50% Injectable 12.5 Gram(s) IV Push once  enoxaparin Injectable 30 milliGRAM(s) SubCutaneous daily  glucagon  Injectable 1 milliGRAM(s) IntraMuscular once PRN  insulin lispro (HumaLOG) corrective regimen sliding scale   SubCutaneous three times a day before meals  insulin lispro Injectable (HumaLOG) 3 Unit(s) SubCutaneous three times a day before meals  sodium chloride 0.9%. 1000 milliLiter(s) IV Continuous <Continuous>    PHYSICAL EXAM:   Vital Signs Last 24 Hrs  T(C): 37 (03 Oct 2019 05:00), Max: 37.1 (02 Oct 2019 11:31)  T(F): 98.6 (03 Oct 2019 05:00), Max: 98.8 (02 Oct 2019 11:31)  HR: 95 (03 Oct 2019 05:00) (85 - 95)  BP: 127/80 (03 Oct 2019 05:00) (119/80 - 158/94)  RR: 18 (03 Oct 2019 05:00) (18 - 18)  SpO2: 100% (03 Oct 2019 05:00) (97% - 100%)    General: No acute distress  HEENT: EOM intact, visual fields full  Abdomen: Soft, nontender, nondistended   Extremities: No edema    NEUROLOGICAL EXAM:  Mental status: Awake, alert, interactive, oriented to person, place, time follows commands   Cranial Nerves: No facial asymmetry, no nystagmus, no dysarthria,  tongue midline  Motor exam: Normal tone, moves all extremities well against gravity   Sensation: decreased to temperature on right side   Coordination/ Gait: No dysmetria, gait deferred.     LABS:                        10.9   5.94  )-----------( 267      ( 02 Oct 2019 12:10 )             34.9    10-02    135  |  105  |  77<H>  ----------------------------<  154<H>  4.9   |  18<L>  |  2.13<H>    Ca    9.6      02 Oct 2019 10:07    TPro  7.0  /  Alb  3.8  /  TBili  0.4  /  DBili  x   /  AST  16  /  ALT  12  /  AlkPhos  93  10-02    Hemoglobin A1C, Whole Blood: 9.6 % (09-30 @ 08:11)  Hemoglobin A1C, Whole Blood: 9.6 % (09-30 @ 08:11)  Hemoglobin A1C, Whole Blood: 9.4 % (09-29 @ 09:35)    IMAGING: Reviewed by me.     MRI/MR Angio Head Neck No Cont (09.29.19)  Acute infarct left thalamus new since 8/19/2016. Small vessel   white matter ischemic changes. [place] there is multifocal narrowing of the   distal anterior middle and posterior cerebral arteries bilaterally which   are new in the interval. This is suspicious for vasculitis or vasospasm   confirmed with conventional angiography.    CT Head No Cont (09.28.19)  No CT evidence of acute intracranial hemorrhage, extra-axial collection,   or mass effect. THE PATIENT WAS SEEN AND EXAMINED BY ME WITH THE HOUSESTAFF AND STROKE TEAM DURING MORNING ROUNDS.   HPI:  59 year old RH F with a PMH of stroke? 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 LKN was 18:30 on 9/27.  Patient had previously taken aspirin, but does not remember why she decided to stop taking it. Patient does not have a neurologist. Patient ambulates with a cane due to right sided weakness/states she drifts to the right without a cane for the past 3 years. Patient stated that she gets physical therapy twice per week. NIHSS: 1 (right hand numbness) MRS: 2     SUBJECTIVE: No events overnight.  No new neurologic complaints.    aspirin  chewable 81 milliGRAM(s) Oral daily  atorvastatin 80 milliGRAM(s) Oral at bedtime  clopidogrel Tablet 75 milliGRAM(s) Oral daily  dextrose 40% Gel 15 Gram(s) Oral once PRN  dextrose 5%. 1000 milliLiter(s) IV Continuous <Continuous>  dextrose 50% Injectable 25 Gram(s) IV Push once  dextrose 50% Injectable 25 Gram(s) IV Push once  dextrose 50% Injectable 12.5 Gram(s) IV Push once  enoxaparin Injectable 30 milliGRAM(s) SubCutaneous daily  glucagon  Injectable 1 milliGRAM(s) IntraMuscular once PRN  insulin lispro (HumaLOG) corrective regimen sliding scale   SubCutaneous three times a day before meals  insulin lispro Injectable (HumaLOG) 3 Unit(s) SubCutaneous three times a day before meals  sodium chloride 0.9%. 1000 milliLiter(s) IV Continuous <Continuous>    PHYSICAL EXAM:   Vital Signs Last 24 Hrs  T(C): 37 (03 Oct 2019 05:00), Max: 37.1 (02 Oct 2019 11:31)  T(F): 98.6 (03 Oct 2019 05:00), Max: 98.8 (02 Oct 2019 11:31)  HR: 95 (03 Oct 2019 05:00) (85 - 95)  BP: 127/80 (03 Oct 2019 05:00) (119/80 - 158/94)  RR: 18 (03 Oct 2019 05:00) (18 - 18)  SpO2: 100% (03 Oct 2019 05:00) (97% - 100%)    General: No acute distress  HEENT: EOM intact, visual fields full  Abdomen: Soft, nontender, nondistended   Extremities: No edema    NEUROLOGICAL EXAM:  Mental status: Awake, alert, interactive, oriented to person, place, time follows commands   Cranial Nerves: No facial asymmetry, no nystagmus, no dysarthria,  tongue midline  Motor exam: Normal tone, moves all extremities well against gravity   Sensation: decreased to temperature on right side   Coordination/ Gait: No dysmetria, gait deferred.     LABS:                        10.9   5.94  )-----------( 267      ( 02 Oct 2019 12:10 )             34.9    10-02    135  |  105  |  77<H>  ----------------------------<  154<H>  4.9   |  18<L>  |  2.13<H>    Ca    9.6      02 Oct 2019 10:07    TPro  7.0  /  Alb  3.8  /  TBili  0.4  /  DBili  x   /  AST  16  /  ALT  12  /  AlkPhos  93  10-02    Hemoglobin A1C, Whole Blood: 9.6 % (09-30 @ 08:11)  Hemoglobin A1C, Whole Blood: 9.6 % (09-30 @ 08:11)  Hemoglobin A1C, Whole Blood: 9.4 % (09-29 @ 09:35)    IMAGING: Reviewed by me.     MRI/MR Angio Head Neck No Cont (09.29.19)  Acute infarct left thalamus new since 8/19/2016. Small vessel   white matter ischemic changes. [place] there is multifocal narrowing of the   distal anterior middle and posterior cerebral arteries bilaterally which   are new in the interval.     CT Head No Cont (09.28.19)  No CT evidence of acute intracranial hemorrhage, extra-axial collection,   or mass effect.

## 2019-10-03 NOTE — CONSULT NOTE ADULT - ASSESSMENT
Cerebral thrombosis with cerebral infarction etiology likely small vessel disease, clinically does not appear to be CNS vasculitis. Suspect atherosclerotic changes in the setting of poorly controlled vascular risk factors.  -  neurologically without acute change, states symptoms are improving, continue close monitoring for neurologic deterioration, permissive HTN followed by gradual normotension as tolerated, titrate statin to LDL goal less than 70, MR imaging as noted, if able to tolerate at later time can consider repeat vessel imaging for further evaluation. Physical therapy/OT recommend AR.   -  ASA, add Plavix 3 weeks then resume asa alone there after.     BREANNE/ CKD 3  - c3/c4/nusrat/c-anca/p-anca  neg so far. she is not anemic.  - bladder scan, renal sono  ni hydro/retention  - gentle ivf to cont as cr improving   - urinalysis  suggestive of infection to have urine culture   - she will stand at risk for worsening renal function including renal replacement therapy as well with iv contrast.   - if neuro angio planned then to receive emma-procedure ivf hydration     diabetes  hgb a1c 9.6  - fs qid  - lispro tid q ac  - insulin ss  - endocrine consult    HLD  - c/w lipitor    DVT px  - lovenox    pt eval

## 2019-10-03 NOTE — DISCHARGE NOTE PROVIDER - PROVIDER TOKENS
PROVIDER:[[id number]],FOLLOWUP:[1 month]],PROVIDER:[[id number]],FOLLOWUP:[1 month]],PROVIDER:[TOKEN:[[id number]],FOLLOWUP:[1 month]] PROVIDER:[TOKEN:[[id number]],FOLLOWUP:[1 month]],PROVIDER:[[id number]],FOLLOWUP:[1 month]],PROVIDER:[[id number]],FOLLOWUP:[1 month]],PROVIDER:[TOKEN:[[id number]],FOLLOWUP:[1 month]],PROVIDER:[TOKEN:[[id number]],FOLLOWUP:[1 month]]

## 2019-10-03 NOTE — PROGRESS NOTE ADULT - SUBJECTIVE AND OBJECTIVE BOX
[facility]   [phone]  RENAL FOLLOW UP NOTE  --------------------------------------------------------------------------------  Chief Complaint:    24 hour events/subjective:    states still decrease ROM RUE    PAST HISTORY  --------------------------------------------------------------------------------  No significant changes to PMH, PSH, FHx, SHx, unless otherwise noted    ALLERGIES & MEDICATIONS  --------------------------------------------------------------------------------  Allergies    No Known Allergies    Intolerances      Standing Inpatient Medications  aspirin  chewable 81 milliGRAM(s) Oral daily  atorvastatin 80 milliGRAM(s) Oral at bedtime  clopidogrel Tablet 75 milliGRAM(s) Oral daily  dextrose 5%. 1000 milliLiter(s) IV Continuous <Continuous>  dextrose 50% Injectable 25 Gram(s) IV Push once  dextrose 50% Injectable 25 Gram(s) IV Push once  dextrose 50% Injectable 12.5 Gram(s) IV Push once  enoxaparin Injectable 30 milliGRAM(s) SubCutaneous daily  insulin lispro (HumaLOG) corrective regimen sliding scale   SubCutaneous three times a day before meals  insulin lispro Injectable (HumaLOG) 3 Unit(s) SubCutaneous three times a day before meals  sodium chloride 0.9%. 1000 milliLiter(s) IV Continuous <Continuous>    PRN Inpatient Medications  dextrose 40% Gel 15 Gram(s) Oral once PRN  glucagon  Injectable 1 milliGRAM(s) IntraMuscular once PRN      REVIEW OF SYSTEMS  --------------------------------------------------------------------------------    Gen: denies  fevers/chills,  CVS: denies chest pain/palpitations  Resp: denies SOB/Cough  GI: Denies N/V/Abd pain  : Denies dysuria/oliguria/hematuria    All other systems were reviewed and are negative, except as noted.    VITALS/PHYSICAL EXAM  --------------------------------------------------------------------------------  T(C): 36.6 (10-03-19 @ 16:17), Max: 37.2 (10-03-19 @ 12:36)  HR: 84 (10-03-19 @ 16:17) (80 - 97)  BP: 144/83 (10-03-19 @ 16:17) (102/67 - 146/87)  RR: 18 (10-03-19 @ 16:17) (17 - 18)  SpO2: 100% (10-03-19 @ 16:17) (100% - 100%)  Wt(kg): --        10-02-19 @ 07:01  -  10-03-19 @ 07:00  --------------------------------------------------------  IN: 600 mL / OUT: 0 mL / NET: 600 mL      Physical Exam:  	  Gen: Non toxic comfortable appearing   	no jvd ,   	Pulm: CTA no rales or ronchi or wheezing  	CV: RRR, S1S2; no rub  	Abd: +BS, soft, nontender/nondistended  	: No suprapubic tenderness  	UE: Warm, no cyanosis  no clubbing,  no edema  	LE: Warm, no cyanosis  no clubbing, no edema  	Neuro: alert and oriented. speech coherent   	Skin: Warm, no decrease skin turgor   			    LABS/STUDIES  --------------------------------------------------------------------------------              10.3   5.91  >-----------<  241      [10-03-19 @ 13:56]              33.2     140  |  112  |  69  ----------------------------<  181      [10-03-19 @ 09:38]  5.0   |  16  |  1.94        Ca     9.5     [10-03-19 @ 09:38]    TPro  7.0  /  Alb  3.8  /  TBili  0.4  /  DBili  x   /  AST  16  /  ALT  12  /  AlkPhos  93  [10-02-19 @ 10:07]        CK 57      [10-02-19 @ 10:07]    Creatinine Trend:  SCr 1.94 [10-03 @ 09:38]  SCr 2.13 [10-02 @ 10:07]  SCr 2.32 [10-01 @ 09:56]  SCr 1.93 [09-30 @ 12:03]  SCr 1.80 [09-30 @ 06:30]              Urinalysis - [10-01-19 @ 19:02]      Color Light Yellow / Appearance Slightly Turbid / SG 1.015 / pH 5.5      Gluc Negative / Ketone Negative  / Bili Negative / Urobili <2 mg/dL       Blood Negative / Protein 100 mg/dL / Leuk Est Large / Nitrite Negative      RBC 4 /  / Hyaline 5 / Gran  / Sq Epi  / Non Sq Epi 4 / Bacteria Negative    Urine Creatinine 116      [10-01-19 @ 16:50]  Urine Protein 109      [10-01-19 @ 16:50]    HbA1c 9.6      [09-30-19 @ 08:11]  TSH 1.38      [09-28-19 @ 15:57]  Lipid: chol 384, , HDL 64,       [09-30-19 @ 09:19]    dsDNA <12      [09-30-19 @ 08:02]  C3 Complement 140      [10-01-19 @ 12:25]  C4 Complement 49      [10-01-19 @ 12:25]  ANCA: cANCA Negative, pANCA Negative, atypical ANCA Negative      [09-30-19 @ 08:02]  MPO-ANCA <5.0, interpretation: Negative      [10-01-19 @ 22:57]  PR3-ANCA <5.0, interpretation: Negative      [10-01-19 @ 22:57]  Syphilis Screen (Treponema Pallidum Ab) Negative      [10-01-19 @ 01:02]    < from: US Kidney and Bladder (10.02.19 @ 14:48) >    EXAM:  US KIDNEYS AND BLADDER                            PROCEDURE DATE:  10/02/2019            INTERPRETATION:  CLINICAL INFORMATION: AK I. Renal failure. Creatinine is   2.32    COMPARISON: None available.    TECHNIQUE: Sonography of the kidneys and bladder.     FINDINGS:    Right kidney:  10.6 cm. No renal mass, hydronephrosis or calculi.   Minimal/Mild increased parenchymal echogenicity.    Left kidney:  10.1 cm. No renal mass, hydronephrosis or calculi.   Minimal/mild increased parenchymalechogenicity.    Urinary bladder: Not completely distended.    IMPRESSION:     Cannot entirely exclude renal parenchymal disease. No hydronephrosis.          SUYAPA HAYNES M.D., ATTENDING RADIOLOGIST  This document has been electronically signed. Oct  2 2019  4:08PM        < end of copied text >

## 2019-10-03 NOTE — CONSULT NOTE ADULT - CONSULT REASON
Uncontrolled Type 2 DM w/ hyperglycemia
medical issues
58 yo F with left thalamic infarct
abnormal creatinine requiring iv contrast
r/o vasculitis
CVA
right face/hand numbness

## 2019-10-03 NOTE — PROGRESS NOTE ADULT - SUBJECTIVE AND OBJECTIVE BOX
Patient is a 59y old  Female who presents with a chief complaint of Stroke (30 Sep 2019 12:48)    Overnight: C/o slight HA in frontal area lasting about 20 minutes in the AM. Denies any temporal pain or changes in her vision. Endorses improvement in her numbness.     MEDICATIONS  (STANDING):  aspirin  chewable 81 milliGRAM(s) Oral daily  atorvastatin 80 milliGRAM(s) Oral at bedtime  clopidogrel Tablet 75 milliGRAM(s) Oral daily  dextrose 5%. 1000 milliLiter(s) (50 mL/Hr) IV Continuous <Continuous>  dextrose 50% Injectable 25 Gram(s) IV Push once  dextrose 50% Injectable 25 Gram(s) IV Push once  dextrose 50% Injectable 12.5 Gram(s) IV Push once  enoxaparin Injectable 30 milliGRAM(s) SubCutaneous daily  insulin lispro (HumaLOG) corrective regimen sliding scale   SubCutaneous three times a day before meals  insulin lispro Injectable (HumaLOG) 3 Unit(s) SubCutaneous three times a day before meals  sodium chloride 0.9%. 1000 milliLiter(s) (50 mL/Hr) IV Continuous <Continuous>    MEDICATIONS  (PRN):  dextrose 40% Gel 15 Gram(s) Oral once PRN Blood Glucose LESS THAN 70 milliGRAM(s)/deciliter  glucagon  Injectable 1 milliGRAM(s) IntraMuscular once PRN Glucose LESS THAN 70 milligrams/deciliter    Allergies: NKDA    PAST MEDICAL & SURGICAL HISTORY:  CVA (cerebral vascular accident)  Hypertension  Diabetes mellitus  Diabetes  No significant past surgical history    FAMILY HISTORY: No family hx of any autoimmune processes such as SLE, RA, IBD    Vital Signs Last 24 Hrs  T(C): 36.9 (03 Oct 2019 08:50), Max: 37 (02 Oct 2019 16:22)  T(F): 98.4 (03 Oct 2019 08:50), Max: 98.6 (02 Oct 2019 16:22)  HR: 97 (03 Oct 2019 08:50) (85 - 97)  BP: 102/67 (03 Oct 2019 08:50) (102/67 - 158/94)  BP(mean): --  RR: 17 (03 Oct 2019 08:50) (17 - 18)  SpO2: 100% (03 Oct 2019 08:50) (97% - 100%)    PHYSICAL EXAM:  All physical exam findings normal, except for those marked:  General Appearance:  Skin 		WNL: no rash, lesion, ulcers, indurations, nodules or tightening, normal nail bed capillaries  		[] Abnormal: 5cm hyperpigmented patch over L shoulder  Eyes		WNL: normal conjunctiva and lids, normal pupils and iris  ENT		WNL: normal appearance of ears, nose lips, teeth, gums, oropharynx, oral mucosal and palate  Neck: 		WNL: no masses, normal thyroid  Cardiovascular: WNL: normal auscultation, normal peripheral pulses, no peripheral edema  Respiratory: 	WNL: normal respiratory effort  GI:		WNL: nontender, nondistended, normoactive bowel sounds  Lymphatic: 	WNL: normal cervical, axillary and inguinal nodes  Neurologic: 	WNL: normal DTR’s, normal sensation  		[] Abnormal: Numbness noted over R CN V (V3) and R arm  Psychiatric: 	WNL: normal judgment and insight, normal memory, normal mood and affect  Musculoskeletal:	WNL: normal digits, normal muscle strength, full ROM,    Lab Results:                          10.9   5.94  )-----------( 267      ( 02 Oct 2019 12:10 )             34.9     10-03    140  |  112<H>  |  69<H>  ----------------------------<  181<H>  5.0   |  16<L>  |  1.94<H>    Ca    9.5      03 Oct 2019 09:38    TPro  7.0  /  Alb  3.8  /  TBili  0.4  /  DBili  x   /  AST  16  /  ALT  12  /  AlkPhos  93  10-02    CARDIAC MARKERS ( 02 Oct 2019 10:07 )  x     / x     / 57 U/L / x     / x        LIVER FUNCTIONS - ( 02 Oct 2019 10:07 )  Alb: 3.8 g/dL / Pro: 7.0 g/dL / ALK PHOS: 93 U/L / ALT: 12 U/L / AST: 16 U/L / GGT: x           Urinalysis Basic - ( 01 Oct 2019 19:02 )    Color: Light Yellow / Appearance: Slightly Turbid / S.015 / pH: x  Gluc: x / Ketone: Negative  / Bili: Negative / Urobili: <2 mg/dL   Blood: x / Protein: 100 mg/dL / Nitrite: Negative   Leuk Esterase: Large / RBC: 4 /HPF /  /HPF   Sq Epi: x / Non Sq Epi: 4 /HPF / Bacteria: Negative    Lipid Profile (19 @ 09:19)    Total Cholesterol/HDL Ratio Measurement: 6.0 RATIO    Cholesterol, Serum: 384 mg/dL    Triglycerides, Serum: 251 mg/dL    HDL Cholesterol, Serum: 64    ESR: 104, CRP: 0.16  C3/C4: 140/49  dsDNA: 12

## 2019-10-03 NOTE — CONSULT NOTE ADULT - PROBLEM SELECTOR RECOMMENDATION 9
Diabetes Education and Nutrition Eval  Glucose values currently at or close to goal.  Hold off on basal insulin  Monitor on Humalog 3 units qac  Low correction scale qac + bedtime  Goal glucose 100-180  Outpt. endo follow-up  Outpt. optho, podiatry, nephrology  Plan to d/c on basal bolus vs. basal + Prandin depending on insulin requirements and renal function. Needs education and pen teaching. Diabetes Education and Nutrition Eval  Glucose values currently at or close to goal.  Hold off on basal insulin  Monitor on Humalog 3 units qac  Low correction scale qac + bedtime  Goal glucose 100-180  Outpt. endo follow-up  Outpt. optho, podiatry, nephrology  Plan to d/c on basal bolus vs. basal + Prandin depending on insulin requirements and renal function. Needs education and pen teaching. Please order her a freestyle Brady upon discharge. She can be educated on its use as an outpatient.

## 2019-10-03 NOTE — DISCHARGE NOTE PROVIDER - CARE PROVIDER_API CALL
Otis Bourne (DO)  Neurology; Vascular Neurology  [address] [facility] [address]  [place] [address]  Phone: [phone]  Fax: [phone]  Follow Up Time: 1 month    Gilberto Hernandez (DO)  Cardiology; Internal Medicine  [address] [facility] [address] [facility]  [place] [address]  Phone: [phone]  Fax: [phone]  Follow Up Time: 1 month    Kadie Wakefield (DO)  Nephrology  [address]  [place] [address]  Phone: [phone]  Fax: [phone]  Follow Up Time: 1 month Otis Bourne (DO)  Neurology; Vascular Neurology  [address] [facility] [address]  [place] [address]  Phone: [phone]  Fax: [phone]  Follow Up Time: 1 month    Gilberto Hernandez (DO)  Cardiology; Internal Medicine  [address], [address]  [place] [address]  Phone: [phone]  Fax: [phone]  Follow Up Time: 1 month    Kadie Wakefield (DO)  Nephrology  [address] [facility] [facility] [address]  [place] [address]  Phone: [phone]  Fax: [phone]  Follow Up Time: 1 month    Ed Gillette (DO)  EndocrinologyMetabDiabetes; Internal Medicine  [address] [place] [address]  Phone: [phone]  Fax: [phone]  Follow Up Time: 1 month    Aura Rodriguez; MPH)  Internal Medicine; Rheumatology  [address] [facility] [address] [facility] [address]  [place] [address]  Phone: [phone]  Fax: [phone]  Follow Up Time: 1 month

## 2019-10-03 NOTE — PROGRESS NOTE ADULT - ASSESSMENT
59 year old RH F with a PMH of stroke? 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  rifts to the right without a cane for the past 3 years. Brain MRI revealed restricted diffusion at the left thalamus. Head and neck MRA with areas of significant constriction follow by dilation (beads in a string pattern) at  the anterior and posterior cerebral arteries concerning for vasculitis. now is being scheduled for neuro angio. pt with smooth vs borderline ckd IV  [ her weight is 118lb]     1- smooth   2- htn   3- uti  4- uncontrolled DM   5- proteinuria  subnephrotic         c3/c4/nusrat/c-anca/p-anca  neg so far.   bladder scan, renal sono non revealing   gentle ivf to cont as cr cont to improve   urinalysis  suggestive of infection to have urine culture  requested again  hyperglycemia control   suspect diabetic nephropathy and uncontrolled htn likely culprit for ckd as all serologies have been non revealing. pt to follow up with nephrology as outpt at rehab and afterwards as well d/w pt

## 2019-10-03 NOTE — CONSULT NOTE ADULT - ASSESSMENT
60 y/o F w/ uncontrolled Type 2 DM w/ hyperglycemia A1c 9.6% complicated by CVA, neuropathy, nephropathy, and retinopathy, HTN, HLD admitted with CVA (high risk patient with high level decision making).

## 2019-10-04 ENCOUNTER — INPATIENT (INPATIENT)
Facility: HOSPITAL | Age: 59
LOS: 7 days | Discharge: HOME HEALTH | DRG: 949 | End: 2019-10-12
Attending: STUDENT IN AN ORGANIZED HEALTH CARE EDUCATION/TRAINING PROGRAM | Admitting: STUDENT IN AN ORGANIZED HEALTH CARE EDUCATION/TRAINING PROGRAM
Payer: COMMERCIAL

## 2019-10-04 VITALS
HEART RATE: 81 BPM | SYSTOLIC BLOOD PRESSURE: 177 MMHG | DIASTOLIC BLOOD PRESSURE: 108 MMHG | OXYGEN SATURATION: 100 % | TEMPERATURE: 98 F | RESPIRATION RATE: 18 BRPM

## 2019-10-04 VITALS
HEART RATE: 87 BPM | HEIGHT: 62 IN | WEIGHT: 123.24 LBS | SYSTOLIC BLOOD PRESSURE: 157 MMHG | TEMPERATURE: 98 F | DIASTOLIC BLOOD PRESSURE: 90 MMHG | RESPIRATION RATE: 18 BRPM | OXYGEN SATURATION: 100 %

## 2019-10-04 DIAGNOSIS — H91.90 UNSPECIFIED HEARING LOSS, UNSPECIFIED EAR: ICD-10-CM

## 2019-10-04 DIAGNOSIS — E11.319 TYPE 2 DIABETES MELLITUS WITH UNSPECIFIED DIABETIC RETINOPATHY WITHOUT MACULAR EDEMA: ICD-10-CM

## 2019-10-04 DIAGNOSIS — Z51.89 ENCOUNTER FOR OTHER SPECIFIED AFTERCARE: ICD-10-CM

## 2019-10-04 DIAGNOSIS — I63.9 CEREBRAL INFARCTION, UNSPECIFIED: ICD-10-CM

## 2019-10-04 DIAGNOSIS — E43 UNSPECIFIED SEVERE PROTEIN-CALORIE MALNUTRITION: ICD-10-CM

## 2019-10-04 DIAGNOSIS — I12.9 HYPERTENSIVE CHRONIC KIDNEY DISEASE WITH STAGE 1 THROUGH STAGE 4 CHRONIC KIDNEY DISEASE, OR UNSPECIFIED CHRONIC KIDNEY DISEASE: ICD-10-CM

## 2019-10-04 DIAGNOSIS — E11.65 TYPE 2 DIABETES MELLITUS WITH HYPERGLYCEMIA: ICD-10-CM

## 2019-10-04 DIAGNOSIS — H54.61 UNQUALIFIED VISUAL LOSS, RIGHT EYE, NORMAL VISION LEFT EYE: ICD-10-CM

## 2019-10-04 DIAGNOSIS — R60.0 LOCALIZED EDEMA: ICD-10-CM

## 2019-10-04 DIAGNOSIS — E78.5 HYPERLIPIDEMIA, UNSPECIFIED: ICD-10-CM

## 2019-10-04 DIAGNOSIS — K59.00 CONSTIPATION, UNSPECIFIED: ICD-10-CM

## 2019-10-04 DIAGNOSIS — E11.22 TYPE 2 DIABETES MELLITUS WITH DIABETIC CHRONIC KIDNEY DISEASE: ICD-10-CM

## 2019-10-04 DIAGNOSIS — I51.9 HEART DISEASE, UNSPECIFIED: ICD-10-CM

## 2019-10-04 DIAGNOSIS — I69.322 DYSARTHRIA FOLLOWING CEREBRAL INFARCTION: ICD-10-CM

## 2019-10-04 DIAGNOSIS — I69.351 HEMIPLEGIA AND HEMIPARESIS FOLLOWING CEREBRAL INFARCTION AFFECTING RIGHT DOMINANT SIDE: ICD-10-CM

## 2019-10-04 DIAGNOSIS — I69.311 MEMORY DEFICIT FOLLOWING CEREBRAL INFARCTION: ICD-10-CM

## 2019-10-04 DIAGNOSIS — R20.0 ANESTHESIA OF SKIN: ICD-10-CM

## 2019-10-04 DIAGNOSIS — N18.3 CHRONIC KIDNEY DISEASE, STAGE 3 (MODERATE): ICD-10-CM

## 2019-10-04 DIAGNOSIS — H40.9 UNSPECIFIED GLAUCOMA: ICD-10-CM

## 2019-10-04 PROBLEM — E11.9 TYPE 2 DIABETES MELLITUS WITHOUT COMPLICATIONS: Chronic | Status: ACTIVE | Noted: 2019-09-28

## 2019-10-04 PROBLEM — I10 ESSENTIAL (PRIMARY) HYPERTENSION: Chronic | Status: ACTIVE | Noted: 2019-09-28

## 2019-10-04 LAB
ANA PAT FLD IF-IMP: ABNORMAL
ANA PATTERN 2: ABNORMAL
ANA TITR SER: ABNORMAL
ANION GAP SERPL CALC-SCNC: 11 MMOL/L — SIGNIFICANT CHANGE UP (ref 5–17)
ANTI NUCLEAR FACTOR TITER 2: ABNORMAL
BUN SERPL-MCNC: 58 MG/DL — HIGH (ref 7–23)
CALCIUM SERPL-MCNC: 9.3 MG/DL — SIGNIFICANT CHANGE UP (ref 8.4–10.5)
CHLORIDE SERPL-SCNC: 113 MMOL/L — HIGH (ref 96–108)
CO2 SERPL-SCNC: 14 MMOL/L — LOW (ref 22–31)
CREAT SERPL-MCNC: 1.76 MG/DL — HIGH (ref 0.5–1.3)
GLUCOSE BLDC GLUCOMTR-MCNC: 111 MG/DL — HIGH (ref 70–99)
GLUCOSE BLDC GLUCOMTR-MCNC: 114 MG/DL — HIGH (ref 70–99)
GLUCOSE BLDC GLUCOMTR-MCNC: 131 MG/DL — HIGH (ref 70–99)
GLUCOSE BLDC GLUCOMTR-MCNC: 136 MG/DL — HIGH (ref 70–99)
GLUCOSE SERPL-MCNC: 125 MG/DL — HIGH (ref 70–99)
HCT VFR BLD CALC: 31.5 % — LOW (ref 34.5–45)
HGB BLD-MCNC: 9.7 G/DL — LOW (ref 11.5–15.5)
MCHC RBC-ENTMCNC: 26.3 PG — LOW (ref 27–34)
MCHC RBC-ENTMCNC: 30.8 GM/DL — LOW (ref 32–36)
MCV RBC AUTO: 85.4 FL — SIGNIFICANT CHANGE UP (ref 80–100)
PLATELET # BLD AUTO: 238 K/UL — SIGNIFICANT CHANGE UP (ref 150–400)
POTASSIUM SERPL-MCNC: 5 MMOL/L — SIGNIFICANT CHANGE UP (ref 3.5–5.3)
POTASSIUM SERPL-SCNC: 5 MMOL/L — SIGNIFICANT CHANGE UP (ref 3.5–5.3)
RBC # BLD: 3.69 M/UL — LOW (ref 3.8–5.2)
RBC # FLD: 13.6 % — SIGNIFICANT CHANGE UP (ref 10.3–14.5)
SODIUM SERPL-SCNC: 138 MMOL/L — SIGNIFICANT CHANGE UP (ref 135–145)
WBC # BLD: 4.54 K/UL — SIGNIFICANT CHANGE UP (ref 3.8–10.5)
WBC # FLD AUTO: 4.54 K/UL — SIGNIFICANT CHANGE UP (ref 3.8–10.5)

## 2019-10-04 PROCEDURE — 83735 ASSAY OF MAGNESIUM: CPT

## 2019-10-04 PROCEDURE — 86160 COMPLEMENT ANTIGEN: CPT

## 2019-10-04 PROCEDURE — 76770 US EXAM ABDO BACK WALL COMP: CPT

## 2019-10-04 PROCEDURE — 82570 ASSAY OF URINE CREATININE: CPT

## 2019-10-04 PROCEDURE — 83516 IMMUNOASSAY NONANTIBODY: CPT

## 2019-10-04 PROCEDURE — 97162 PT EVAL MOD COMPLEX 30 MIN: CPT

## 2019-10-04 PROCEDURE — 99232 SBSQ HOSP IP/OBS MODERATE 35: CPT

## 2019-10-04 PROCEDURE — 87086 URINE CULTURE/COLONY COUNT: CPT

## 2019-10-04 PROCEDURE — 80048 BASIC METABOLIC PNL TOTAL CA: CPT

## 2019-10-04 PROCEDURE — 71045 X-RAY EXAM CHEST 1 VIEW: CPT

## 2019-10-04 PROCEDURE — 70551 MRI BRAIN STEM W/O DYE: CPT

## 2019-10-04 PROCEDURE — 85652 RBC SED RATE AUTOMATED: CPT

## 2019-10-04 PROCEDURE — 84100 ASSAY OF PHOSPHORUS: CPT

## 2019-10-04 PROCEDURE — 93306 TTE W/DOPPLER COMPLETE: CPT

## 2019-10-04 PROCEDURE — 80307 DRUG TEST PRSMV CHEM ANLYZR: CPT

## 2019-10-04 PROCEDURE — 70544 MR ANGIOGRAPHY HEAD W/O DYE: CPT

## 2019-10-04 PROCEDURE — 86803 HEPATITIS C AB TEST: CPT

## 2019-10-04 PROCEDURE — 85610 PROTHROMBIN TIME: CPT

## 2019-10-04 PROCEDURE — 81001 URINALYSIS AUTO W/SCOPE: CPT

## 2019-10-04 PROCEDURE — 86480 TB TEST CELL IMMUN MEASURE: CPT

## 2019-10-04 PROCEDURE — 82962 GLUCOSE BLOOD TEST: CPT

## 2019-10-04 PROCEDURE — 97116 GAIT TRAINING THERAPY: CPT

## 2019-10-04 PROCEDURE — 86038 ANTINUCLEAR ANTIBODIES: CPT

## 2019-10-04 PROCEDURE — 86140 C-REACTIVE PROTEIN: CPT

## 2019-10-04 PROCEDURE — 93005 ELECTROCARDIOGRAM TRACING: CPT

## 2019-10-04 PROCEDURE — G0378: CPT

## 2019-10-04 PROCEDURE — 82550 ASSAY OF CK (CPK): CPT

## 2019-10-04 PROCEDURE — 70450 CT HEAD/BRAIN W/O DYE: CPT

## 2019-10-04 PROCEDURE — 97166 OT EVAL MOD COMPLEX 45 MIN: CPT

## 2019-10-04 PROCEDURE — 80053 COMPREHEN METABOLIC PANEL: CPT

## 2019-10-04 PROCEDURE — 84156 ASSAY OF PROTEIN URINE: CPT

## 2019-10-04 PROCEDURE — 84443 ASSAY THYROID STIM HORMONE: CPT

## 2019-10-04 PROCEDURE — 85730 THROMBOPLASTIN TIME PARTIAL: CPT

## 2019-10-04 PROCEDURE — 83036 HEMOGLOBIN GLYCOSYLATED A1C: CPT

## 2019-10-04 PROCEDURE — 86780 TREPONEMA PALLIDUM: CPT

## 2019-10-04 PROCEDURE — 86036 ANCA SCREEN EACH ANTIBODY: CPT

## 2019-10-04 PROCEDURE — 86225 DNA ANTIBODY NATIVE: CPT

## 2019-10-04 PROCEDURE — 97110 THERAPEUTIC EXERCISES: CPT

## 2019-10-04 PROCEDURE — 92523 SPEECH SOUND LANG COMPREHEN: CPT

## 2019-10-04 PROCEDURE — 85027 COMPLETE CBC AUTOMATED: CPT

## 2019-10-04 PROCEDURE — 97530 THERAPEUTIC ACTIVITIES: CPT

## 2019-10-04 PROCEDURE — 99285 EMERGENCY DEPT VISIT HI MDM: CPT | Mod: 25

## 2019-10-04 PROCEDURE — 99222 1ST HOSP IP/OBS MODERATE 55: CPT

## 2019-10-04 PROCEDURE — 70547 MR ANGIOGRAPHY NECK W/O DYE: CPT

## 2019-10-04 PROCEDURE — 86235 NUCLEAR ANTIGEN ANTIBODY: CPT

## 2019-10-04 PROCEDURE — 80061 LIPID PANEL: CPT

## 2019-10-04 RX ORDER — ATORVASTATIN CALCIUM 80 MG/1
80 TABLET, FILM COATED ORAL AT BEDTIME
Refills: 0 | Status: DISCONTINUED | OUTPATIENT
Start: 2019-10-04 | End: 2019-10-12

## 2019-10-04 RX ORDER — AMLODIPINE BESYLATE 2.5 MG/1
5 TABLET ORAL ONCE
Refills: 0 | Status: COMPLETED | OUTPATIENT
Start: 2019-10-04 | End: 2019-10-04

## 2019-10-04 RX ORDER — INSULIN LISPRO 100/ML
VIAL (ML) SUBCUTANEOUS
Refills: 0 | Status: DISCONTINUED | OUTPATIENT
Start: 2019-10-04 | End: 2019-10-12

## 2019-10-04 RX ORDER — DEXTROSE 50 % IN WATER 50 %
25 SYRINGE (ML) INTRAVENOUS ONCE
Refills: 0 | Status: DISCONTINUED | OUTPATIENT
Start: 2019-10-04 | End: 2019-10-12

## 2019-10-04 RX ORDER — SODIUM CHLORIDE 9 MG/ML
1000 INJECTION, SOLUTION INTRAVENOUS
Refills: 0 | Status: DISCONTINUED | OUTPATIENT
Start: 2019-10-04 | End: 2019-10-12

## 2019-10-04 RX ORDER — ACETAZOLAMIDE 250 MG/1
1 TABLET ORAL
Qty: 0 | Refills: 0 | DISCHARGE

## 2019-10-04 RX ORDER — INSULIN LISPRO 100/ML
3 VIAL (ML) SUBCUTANEOUS
Refills: 0 | Status: DISCONTINUED | OUTPATIENT
Start: 2019-10-04 | End: 2019-10-07

## 2019-10-04 RX ORDER — ENOXAPARIN SODIUM 100 MG/ML
30 INJECTION SUBCUTANEOUS DAILY
Refills: 0 | Status: DISCONTINUED | OUTPATIENT
Start: 2019-10-04 | End: 2019-10-12

## 2019-10-04 RX ORDER — LISINOPRIL 2.5 MG/1
20 TABLET ORAL ONCE
Refills: 0 | Status: COMPLETED | OUTPATIENT
Start: 2019-10-04 | End: 2019-10-04

## 2019-10-04 RX ORDER — INSULIN ASPART 100 [IU]/ML
10 INJECTION, SOLUTION SUBCUTANEOUS
Qty: 0 | Refills: 0 | DISCHARGE

## 2019-10-04 RX ORDER — LISINOPRIL/HYDROCHLOROTHIAZIDE 10-12.5 MG
2 TABLET ORAL
Qty: 0 | Refills: 0 | DISCHARGE

## 2019-10-04 RX ORDER — INSULIN DETEMIR 100/ML (3)
14 INSULIN PEN (ML) SUBCUTANEOUS
Qty: 0 | Refills: 0 | DISCHARGE

## 2019-10-04 RX ORDER — ATORVASTATIN CALCIUM 80 MG/1
1 TABLET, FILM COATED ORAL
Qty: 0 | Refills: 0 | DISCHARGE

## 2019-10-04 RX ORDER — GLUCAGON INJECTION, SOLUTION 0.5 MG/.1ML
1 INJECTION, SOLUTION SUBCUTANEOUS ONCE
Refills: 0 | Status: DISCONTINUED | OUTPATIENT
Start: 2019-10-04 | End: 2019-10-12

## 2019-10-04 RX ORDER — CLOPIDOGREL BISULFATE 75 MG/1
1 TABLET, FILM COATED ORAL
Qty: 0 | Refills: 0 | DISCHARGE
Start: 2019-10-04

## 2019-10-04 RX ORDER — DEXTROSE 50 % IN WATER 50 %
12.5 SYRINGE (ML) INTRAVENOUS ONCE
Refills: 0 | Status: DISCONTINUED | OUTPATIENT
Start: 2019-10-04 | End: 2019-10-12

## 2019-10-04 RX ORDER — ATORVASTATIN CALCIUM 80 MG/1
1 TABLET, FILM COATED ORAL
Qty: 0 | Refills: 0 | DISCHARGE
Start: 2019-10-04

## 2019-10-04 RX ORDER — ASPIRIN/CALCIUM CARB/MAGNESIUM 324 MG
81 TABLET ORAL DAILY
Refills: 0 | Status: DISCONTINUED | OUTPATIENT
Start: 2019-10-04 | End: 2019-10-12

## 2019-10-04 RX ORDER — SITAGLIPTIN AND METFORMIN HYDROCHLORIDE 500; 50 MG/1; MG/1
1 TABLET, FILM COATED ORAL
Qty: 0 | Refills: 0 | DISCHARGE

## 2019-10-04 RX ORDER — CLOPIDOGREL BISULFATE 75 MG/1
75 TABLET, FILM COATED ORAL DAILY
Refills: 0 | Status: DISCONTINUED | OUTPATIENT
Start: 2019-10-04 | End: 2019-10-12

## 2019-10-04 RX ORDER — ASPIRIN/CALCIUM CARB/MAGNESIUM 324 MG
1 TABLET ORAL
Qty: 0 | Refills: 0 | DISCHARGE
Start: 2019-10-04

## 2019-10-04 RX ORDER — DEXTROSE 50 % IN WATER 50 %
15 SYRINGE (ML) INTRAVENOUS ONCE
Refills: 0 | Status: DISCONTINUED | OUTPATIENT
Start: 2019-10-04 | End: 2019-10-12

## 2019-10-04 RX ADMIN — Medication 3 UNIT(S): at 20:50

## 2019-10-04 RX ADMIN — Medication 3 UNIT(S): at 12:20

## 2019-10-04 RX ADMIN — AMLODIPINE BESYLATE 5 MILLIGRAM(S): 2.5 TABLET ORAL at 13:45

## 2019-10-04 RX ADMIN — LISINOPRIL 20 MILLIGRAM(S): 2.5 TABLET ORAL at 15:12

## 2019-10-04 RX ADMIN — CLOPIDOGREL BISULFATE 75 MILLIGRAM(S): 75 TABLET, FILM COATED ORAL at 12:06

## 2019-10-04 RX ADMIN — ATORVASTATIN CALCIUM 80 MILLIGRAM(S): 80 TABLET, FILM COATED ORAL at 21:25

## 2019-10-04 RX ADMIN — SODIUM CHLORIDE 50 MILLILITER(S): 9 INJECTION INTRAMUSCULAR; INTRAVENOUS; SUBCUTANEOUS at 08:43

## 2019-10-04 RX ADMIN — Medication 3 UNIT(S): at 17:42

## 2019-10-04 RX ADMIN — ENOXAPARIN SODIUM 30 MILLIGRAM(S): 100 INJECTION SUBCUTANEOUS at 12:05

## 2019-10-04 RX ADMIN — Medication 81 MILLIGRAM(S): at 12:06

## 2019-10-04 RX ADMIN — Medication 3 UNIT(S): at 08:43

## 2019-10-04 NOTE — PROGRESS NOTE ADULT - SUBJECTIVE AND OBJECTIVE BOX
Chief Complaint: Evaluating this 58 y/o F for uncontrolled Type 2 DM w/ hyperglycemia      Interval History: Was waiting for discharge but BP now elevated. Pt asymptomatic Attributes the B[P to having soup and dressing on her salad. Glucose values remain at goal only on pre-meal insulin.     MEDICATIONS  (STANDING):  aspirin  chewable 81 milliGRAM(s) Oral daily  atorvastatin 80 milliGRAM(s) Oral at bedtime  clopidogrel Tablet 75 milliGRAM(s) Oral daily  dextrose 5%. 1000 milliLiter(s) (50 mL/Hr) IV Continuous <Continuous>  dextrose 50% Injectable 25 Gram(s) IV Push once  dextrose 50% Injectable 25 Gram(s) IV Push once  dextrose 50% Injectable 12.5 Gram(s) IV Push once  enoxaparin Injectable 30 milliGRAM(s) SubCutaneous daily  insulin lispro (HumaLOG) corrective regimen sliding scale   SubCutaneous three times a day before meals  insulin lispro Injectable (HumaLOG) 3 Unit(s) SubCutaneous three times a day before meals    MEDICATIONS  (PRN):  dextrose 40% Gel 15 Gram(s) Oral once PRN Blood Glucose LESS THAN 70 milliGRAM(s)/deciliter  glucagon  Injectable 1 milliGRAM(s) IntraMuscular once PRN Glucose LESS THAN 70 milligrams/deciliter      Allergies    No Known Allergies    Intolerances      Review of Systems:  Constitutional: No fever  Eyes: No blurry vision  Cardiovascular: No chest pain  Respiratory: No SOB  GI: No abdominal pain, No nausea, No vomiting  Endocrine: as noted in HPI    All other negative      PHYSICAL EXAM:  VITALS: T(C): 36.6 (10-04-19 @ 12:36)  T(F): 97.9 (10-04-19 @ 12:36), Max: 98.2 (10-04-19 @ 04:42)  HR: 74 (10-04-19 @ 15:12) (74 - 84)  BP: 179/87 (10-04-19 @ 15:12) (127/82 - 191/110)  RR:  (18 - 18)  SpO2:  (97% - 100%)  Wt(kg): --  GENERAL: NAD at this time  EYES: EOMI, No proptosis  HEENT:  Atraumatic, Normocephalic,   RESPIRATORY: full excursion, non labored  CARDIOVASCULAR: Regular rhythm; normal S1/S2, no peripheral edema  GI: Soft, nontender, non distended, normal bowel sounds  SKIN: Warm and dry  PSYCH: normal affect, normal mood      POCT Blood Glucose.: 131 mg/dL (10-04-19 @ 12:17)  POCT Blood Glucose.: 136 mg/dL (10-04-19 @ 08:35)  POCT Blood Glucose.: 87 mg/dL (10-03-19 @ 22:07)  POCT Blood Glucose.: 103 mg/dL (10-03-19 @ 17:36)  POCT Blood Glucose.: 155 mg/dL (10-03-19 @ 13:03)  POCT Blood Glucose.: 154 mg/dL (10-03-19 @ 09:29)  POCT Blood Glucose.: 122 mg/dL (10-02-19 @ 21:30)  POCT Blood Glucose.: 106 mg/dL (10-02-19 @ 17:30)  POCT Blood Glucose.: 84 mg/dL (10-02-19 @ 12:30)  POCT Blood Glucose.: 173 mg/dL (10-02-19 @ 08:21)  POCT Blood Glucose.: 87 mg/dL (10-01-19 @ 21:39)  POCT Blood Glucose.: 118 mg/dL (10-01-19 @ 17:32)        10-04    138  |  113<H>  |  58<H>  ----------------------------<  125<H>  5.0   |  14<L>  |  1.76<H>    EGFR if : 36<L>  EGFR if non : 31<L>    Ca    9.3      10-04    TPro  7.0  /  Alb  3.8  /  TBili  0.4  /  DBili  x   /  AST  16  /  ALT  12  /  AlkPhos  93  10-02        Thyroid Function Tests:  09-28 @ 15:57 TSH 1.38 FreeT4 -- T3 -- Anti TPO -- Anti Thyroglobulin Ab -- TSI --      Hemoglobin A1C, Whole Blood: 9.6 % <H> [4.0 - 5.6] (09-30-19 @ 08:11)  Hemoglobin A1C, Whole Blood: 9.6 % <H> [4.0 - 5.6] (09-30-19 @ 08:11)  Hemoglobin A1C, Whole Blood: 9.4 % <H> [4.0 - 5.6] (09-29-19 @ 09:35)

## 2019-10-04 NOTE — PROGRESS NOTE ADULT - ASSESSMENT
Cerebral thrombosis with cerebral infarction etiology likely small vessel disease, clinically does not appear to be CNS vasculitis. Suspect atherosclerotic changes in the setting of poorly controlled vascular risk factors.  -  neurologically without acute change, states symptoms are improving, continue close monitoring for neurologic deterioration, permissive HTN followed by gradual normotension as tolerated, titrate statin to LDL goal less than 70, MR imaging as noted, if able to tolerate at later time can consider repeat vessel imaging for further evaluation. Physical therapy/OT recommend AR.   -  ASA, add Plavix 3 weeks then resume asa alone there after.     BREANNE/ CKD 3  cre is improving    anemia  - workup as out pt    diabetes  hgb a1c 9.6  - fs qid  - lispro tid q ac  - insulin ss  - endocrine consult    HLD  - c/w lipitor    DVT px  - lovenox    pt eval

## 2019-10-04 NOTE — PROGRESS NOTE ADULT - SUBJECTIVE AND OBJECTIVE BOX
THE PATIENT WAS SEEN AND EXAMINED BY ME WITH THE HOUSESTAFF AND STROKE TEAM DURING MORNING ROUNDS.   HPI:  59 year old RH F with a PMH of stroke? 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 LKN was 18:30 on 9/27.  Patient had previously taken aspirin, but does not remember why she decided to stop taking it. Patient does not have a neurologist. Patient ambulates with a cane due to right sided weakness/states she drifts to the right without a cane for the past 3 years. Patient stated that she gets physical therapy twice per week. NIHSS: 1 (right hand numbness) MRS: 2     SUBJECTIVE: No events overnight.  No new neurologic complaints.    aspirin  chewable 81 milliGRAM(s) Oral daily  atorvastatin 80 milliGRAM(s) Oral at bedtime  clopidogrel Tablet 75 milliGRAM(s) Oral daily  dextrose 40% Gel 15 Gram(s) Oral once PRN  dextrose 5%. 1000 milliLiter(s) IV Continuous <Continuous>  dextrose 50% Injectable 25 Gram(s) IV Push once  dextrose 50% Injectable 25 Gram(s) IV Push once  dextrose 50% Injectable 12.5 Gram(s) IV Push once  enoxaparin Injectable 30 milliGRAM(s) SubCutaneous daily  glucagon  Injectable 1 milliGRAM(s) IntraMuscular once PRN  insulin lispro (HumaLOG) corrective regimen sliding scale   SubCutaneous three times a day before meals  insulin lispro Injectable (HumaLOG) 3 Unit(s) SubCutaneous three times a day before meals  sodium chloride 0.9%. 1000 milliLiter(s) IV Continuous <Continuous>    PHYSICAL EXAM:   Vital Signs Last 24 Hrs  T(C): 36.8 (04 Oct 2019 04:42), Max: 37.2 (03 Oct 2019 12:36)  T(F): 98.2 (04 Oct 2019 04:42), Max: 98.9 (03 Oct 2019 12:36)  HR: 78 (04 Oct 2019 04:42) (77 - 97)  BP: 137/84 (04 Oct 2019 04:42) (102/67 - 156/88)  RR: 18 (04 Oct 2019 04:42) (17 - 18)  SpO2: 100% (04 Oct 2019 04:42) (100% - 100%)    General: No acute distress  HEENT: EOM intact, visual fields full  Abdomen: Soft, nontender, nondistended   Extremities: No edema    NEUROLOGICAL EXAM:  Mental status: Awake, alert, interactive, oriented to person, place, time, follows commands   Cranial Nerves: No facial asymmetry, no nystagmus, no dysarthria,  tongue midline  Motor exam: Normal tone, moves all extremities well against gravity   Sensation: decreased to temperature on right side   Coordination/ Gait: No dysmetria, gait deferred.     LABS:                        10.3   5.91  )-----------( 241      ( 03 Oct 2019 13:56 )             33.2    10-03    140  |  112<H>  |  69<H>  ----------------------------<  181<H>  5.0   |  16<L>  |  1.94<H>    Ca    9.5      03 Oct 2019 09:38    TPro  7.0  /  Alb  3.8  /  TBili  0.4  /  DBili  x   /  AST  16  /  ALT  12  /  AlkPhos  93  10-02    Hemoglobin A1C, Whole Blood: 9.6 % (09-30 @ 08:11)  Hemoglobin A1C, Whole Blood: 9.6 % (09-30 @ 08:11)  Hemoglobin A1C, Whole Blood: 9.4 % (09-29 @ 09:35)    IMAGING: Reviewed by me.     MRI/MR Angio Head Neck No Cont (09.29.19)  Acute infarct left thalamus new since 8/19/2016. Small vessel   white matter ischemic changes. [place] there is multifocal narrowing of the   distal anterior middle and posterior cerebral arteries bilaterally which   are new in the interval.     CT Head No Cont (09.28.19)  No CT evidence of acute intracranial hemorrhage, extra-axial collection,   or mass effect.

## 2019-10-04 NOTE — PROGRESS NOTE ADULT - SUBJECTIVE AND OBJECTIVE BOX
Patient is a 59y old  Female who presents with a chief complaint of Stroke (04 Oct 2019 11:21)      SUBJECTIVE / OVERNIGHT EVENTS:    MEDICATIONS  (STANDING):  aspirin  chewable 81 milliGRAM(s) Oral daily  atorvastatin 80 milliGRAM(s) Oral at bedtime  clopidogrel Tablet 75 milliGRAM(s) Oral daily  dextrose 5%. 1000 milliLiter(s) (50 mL/Hr) IV Continuous <Continuous>  dextrose 50% Injectable 25 Gram(s) IV Push once  dextrose 50% Injectable 25 Gram(s) IV Push once  dextrose 50% Injectable 12.5 Gram(s) IV Push once  enoxaparin Injectable 30 milliGRAM(s) SubCutaneous daily  insulin lispro (HumaLOG) corrective regimen sliding scale   SubCutaneous three times a day before meals  insulin lispro Injectable (HumaLOG) 3 Unit(s) SubCutaneous three times a day before meals    MEDICATIONS  (PRN):  dextrose 40% Gel 15 Gram(s) Oral once PRN Blood Glucose LESS THAN 70 milliGRAM(s)/deciliter  glucagon  Injectable 1 milliGRAM(s) IntraMuscular once PRN Glucose LESS THAN 70 milligrams/deciliter      Vital Signs Last 24 Hrs  T(C): 36.6 (04 Oct 2019 08:25), Max: 36.8 (04 Oct 2019 04:42)  T(F): 97.8 (04 Oct 2019 08:25), Max: 98.2 (04 Oct 2019 04:42)  HR: 83 (04 Oct 2019 08:25) (77 - 84)  BP: 145/74 (04 Oct 2019 08:25) (127/82 - 156/88)  BP(mean): --  RR: 18 (04 Oct 2019 08:25) (18 - 18)  SpO2: 100% (04 Oct 2019 08:25) (100% - 100%)  CAPILLARY BLOOD GLUCOSE      POCT Blood Glucose.: 131 mg/dL (04 Oct 2019 12:17)  POCT Blood Glucose.: 136 mg/dL (04 Oct 2019 08:35)  POCT Blood Glucose.: 87 mg/dL (03 Oct 2019 22:07)  POCT Blood Glucose.: 103 mg/dL (03 Oct 2019 17:36)  POCT Blood Glucose.: 155 mg/dL (03 Oct 2019 13:03)    I&O's Summary      PHYSICAL EXAM:  GENERAL: NAD, well-developed  HEAD:  Atraumatic, Normocephalic  EYES: EOMI, PERRLA, conjunctiva and sclera clear  NECK: Supple, No JVD  CHEST/LUNG: Clear to auscultation bilaterally; No wheeze  HEART: Regular rate and rhythm; No murmurs, rubs, or gallops  ABDOMEN: Soft, Nontender, Nondistended; Bowel sounds present  EXTREMITIES:  2+ Peripheral Pulses, No clubbing, cyanosis, or edema  PSYCH: AAOx3  NEUROLOGY: non-focal  SKIN: No rashes or lesions    LABS:                        9.7    4.54  )-----------( 238      ( 04 Oct 2019 08:22 )             31.5     10-04    138  |  113<H>  |  58<H>  ----------------------------<  125<H>  5.0   |  14<L>  |  1.76<H>    Ca    9.3      04 Oct 2019 07:02                RADIOLOGY & ADDITIONAL TESTS:    Imaging Personally Reviewed:    Consultant(s) Notes Reviewed:      Care Discussed with Consultants/Other Providers:

## 2019-10-04 NOTE — PROGRESS NOTE ADULT - SUBJECTIVE AND OBJECTIVE BOX
[facility]   [phone]  RENAL FOLLOW UP NOTE  --------------------------------------------------------------------------------  Chief Complaint:    24 hour events/subjective:    seen earlier. no c/o     PAST HISTORY  --------------------------------------------------------------------------------  No significant changes to PMH, PSH, FHx, SHx, unless otherwise noted    ALLERGIES & MEDICATIONS  --------------------------------------------------------------------------------  Allergies    No Known Allergies    Intolerances      Standing Inpatient Medications  aspirin  chewable 81 milliGRAM(s) Oral daily  atorvastatin 80 milliGRAM(s) Oral at bedtime  clopidogrel Tablet 75 milliGRAM(s) Oral daily  dextrose 5%. 1000 milliLiter(s) IV Continuous <Continuous>  dextrose 50% Injectable 25 Gram(s) IV Push once  dextrose 50% Injectable 25 Gram(s) IV Push once  dextrose 50% Injectable 12.5 Gram(s) IV Push once  enoxaparin Injectable 30 milliGRAM(s) SubCutaneous daily  insulin lispro (HumaLOG) corrective regimen sliding scale   SubCutaneous three times a day before meals  insulin lispro Injectable (HumaLOG) 3 Unit(s) SubCutaneous three times a day before meals    PRN Inpatient Medications  dextrose 40% Gel 15 Gram(s) Oral once PRN  glucagon  Injectable 1 milliGRAM(s) IntraMuscular once PRN      REVIEW OF SYSTEMS  --------------------------------------------------------------------------------    Gen: denies  fevers/chills,  CVS: denies chest pain/palpitations  Resp: denies SOB/Cough  GI: Denies N/V/Abd pain  : Denies dysuria    All other systems were reviewed and are negative, except as noted.    VITALS/PHYSICAL EXAM  --------------------------------------------------------------------------------  T(C): 36.7 (10-04-19 @ 20:11), Max: 36.9 (10-04-19 @ 16:10)  HR: 87 (10-04-19 @ 20:11) (74 - 87)  BP: 157/90 (10-04-19 @ 20:11) (137/84 - 191/110)  RR: 18 (10-04-19 @ 20:11) (18 - 18)  SpO2: 100% (10-04-19 @ 20:11) (97% - 100%)  Wt(kg): --  Height (cm): 157.5 (10-04-19 @ 20:11)  Weight (kg): 55.9 (10-04-19 @ 20:11)  BMI (kg/m2): 22.5 (10-04-19 @ 20:11)  BSA (m2): 1.56 (10-04-19 @ 20:11)      Physical Exam:  	  Gen: Non toxic comfortable appearing   	no jvd ,   	Pulm: CTA no rales or ronchi or wheezing  	CV: RRR, S1S2; no rub  	Abd: +BS, soft, nontender/nondistended  	: No suprapubic tenderness  	UE: Warm, no cyanosis  no clubbing,  no edema  	LE: Warm, no cyanosis  no clubbing, no edema  	Neuro: alert and oriented. speech coherent   	Skin: Warm, no decrease skin turgor   			  	    LABS/STUDIES  --------------------------------------------------------------------------------              9.7    4.54  >-----------<  238      [10-04-19 @ 08:22]              31.5     138  |  113  |  58  ----------------------------<  125      [10-04-19 @ 07:02]  5.0   |  14  |  1.76        Ca     9.3     [10-04-19 @ 07:02]            Creatinine Trend:  SCr 1.76 [10-04 @ 07:02]  SCr 1.94 [10-03 @ 09:38]  SCr 2.13 [10-02 @ 10:07]  SCr 2.32 [10-01 @ 09:56]  SCr 1.93 [09-30 @ 12:03]              Urinalysis - [10-01-19 @ 19:02]      Color Light Yellow / Appearance Slightly Turbid / SG 1.015 / pH 5.5      Gluc Negative / Ketone Negative  / Bili Negative / Urobili <2 mg/dL       Blood Negative / Protein 100 mg/dL / Leuk Est Large / Nitrite Negative      RBC 4 /  / Hyaline 5 / Gran  / Sq Epi  / Non Sq Epi 4 / Bacteria Negative    Urine Creatinine 116      [10-01-19 @ 16:50]  Urine Protein 109      [10-01-19 @ 16:50]    HbA1c 9.6      [09-30-19 @ 08:11]  TSH 1.38      [09-28-19 @ 15:57]  Lipid: chol 384, , HDL 64,       [09-30-19 @ 09:19]    MARILOU: titer 1:160, pattern Speckled      [09-30-19 @ 08:02]  dsDNA <12      [09-30-19 @ 08:02]  C3 Complement 140      [10-01-19 @ 12:25]  C4 Complement 49      [10-01-19 @ 12:25]  ANCA: cANCA Negative, pANCA Negative, atypical ANCA Negative      [09-30-19 @ 08:02]  MPO-ANCA <5.0, interpretation: Negative      [10-01-19 @ 22:57]  PR3-ANCA <5.0, interpretation: Negative      [10-01-19 @ 22:57]  Syphilis Screen (Treponema Pallidum Ab) Negative      [10-01-19 @ 01:02]

## 2019-10-04 NOTE — DISCHARGE NOTE NURSING/CASE MANAGEMENT/SOCIAL WORK - NSDCPEPTSTRK_GEN_ALL_CORE
Prescribed medications/Stroke education booklet/Risk factors for stroke/Stroke support groups for patients, families, and friends/Call 911 for stroke/Need for follow up after discharge/Stroke warning signs and symptoms/Signs and symptoms of stroke

## 2019-10-04 NOTE — H&P ADULT - HISTORY OF PRESENT ILLNESS
Pt is a 60 y/o RH F with PMHx of stroke? 3 years ago (presented with right hemiparesis/dysarthria), HTN, T2DM (with retinopathy?), glaucoma? with right eye blindness, and hearing loss (has hearing aids) who presented with right hand weakness/numbness and right mouth numbness to [facility] on 9/29/19. LKN was 18:30 on 9/27.  Brain MRI revealed acute infarct at the left thalamus. Head and neck MRA with areas of significant constriction follow by dilation (beads in a string pattern) at the anterior and posterior cerebral arteries noted to be concerning for vasculitis. Rheumatology was consulted for concerns of vasculitis, but her workup was unrevealing. Per Neuro, etiology of stroke likely small vessel disease vs less likely CNS vasculitis as they suspect atherosclerotic changes in the setting of poorly controlled vascular risk factors.

## 2019-10-04 NOTE — CHART NOTE - NSCHARTNOTEFT_GEN_A_CORE
Brief Education Note:    Pt seen for T2DM diet education prior to discharge, HbA1c 9.6%. Chart events reviewed and noted. Per chart, 60 y/o female admitted for stroke.    Patient stated she began to make diet modifications such as limiting refined carbohydrates and omitting soda from diet. Pt with limited prior nutrition education regarding T2DM and dietary recommendations.     Education: Discussed the following with the patient- carbohydrate sources, portion sizes, balanced plate, consistent carbohydrate intake, increasing fiber intake, signs and symptoms of hypoglycemia, how to treat hypoglycemia, outpatient endocrinologist and Registered Dietitian reference, relationship of carbs and blood sugar, appropriate BG ranges, necessary diet modifications. Written education also provided. Pt receptive to information.     RD remains available.     Molly Ma RD. Pager: [phone]

## 2019-10-04 NOTE — H&P ADULT - ASSESSMENT
Pt is a 58 y/o RH F with PMHx of stroke? 3 years ago (presented with right hemiparesis/dysarthria), HTN, T2DM (with retinopathy?), glaucoma? with right eye blindness, and hearing loss (has hearing aids) who presented with right hand weakness/numbness and right mouth numbness to [facility] on 9/29/19. Pt found to have an acute left thalamic infarct, likely secondary to small vessel disease vs CNS vasculitis.    #CVA  - left thalamic infarct with right hand and mouth weakness and numbness  - continue Aspirin  - continue Plavix for a total of 3 weeks (until 10/23)  - continue statin    #DM  - endocrine following at [facility]  - lispro 3u before meals  - sliding scale    #Diet  - Consistent Carb    #DVT ppx  - Lovenox    MEDICAL PROGNOSIS: GOOD                                   REHAB POTENTIAL: GOOD  ESTIMATED DISPOSITION: HOME                             ELOS: 10-14 Days   EXPECTED THERAPY:     P.T. 1 hr/day       O.T. 1 hr/day      S.L.P. 1 hr/day      EXP FREQUENCY: 5 days per 7 day period     PRESCREEN COMPARISON I have reviewed the prescreen information and I have found no relevant changes between the preadmission screening and my post admission evaluation     RATIONALE FOR INPATIENT ADMISSION - Patient demonstrates the following: (check all that apply)  [X] Medically appropriate for rehabilitation admission  [X] Has attainable rehab goals with an appropriate initial discharge plan  [X] Has rehabilitation potential (expected to make a significant improvement within a reasonable period of time)   [X] Requires close medical management by a rehab physician, rehab nursing care, Hospitalist and comprehensive interdisciplinary team (including PT, OT, & or SLP, Prosthetics and Orthotics)

## 2019-10-04 NOTE — H&P ADULT - ATTENDING COMMENTS
Patient seen and examined this AM. Begin ACUTE inpatient rehabilitation for the functional deficits consisting of 3 hours of therapy/day & 24 hour RN/daily PMR physician for comorbid medical management.

## 2019-10-04 NOTE — H&P ADULT - NSHPPHYSICALEXAM_GEN_ALL_CORE
PHYSICAL EXAM    Gen - NAD, Comfortable  HEENT - NCAT, EOMI, MMM; pt is legally blind in right eye  Neck - Supple, No limited ROM  Pulm - CTAB, No wheeze, No rhonchi, No crackles  Cardiovascular - RRR, S1S2, No murmurs  Abdomen - Soft, NT/ND, +BS  Extremities - No C/C/E, No calf tenderness  Neuro-     Cognitive - AAOx3     Communication - Fluent, No dysarthria     Attention: able to recite days of week backwards; serial 7's x6     Memory: Recall 3 objects immediate and 3 min later         Cranial Nerves - CN 2-12 intact     Motor -                     LEFT    UE - ShAB 4/5, EF 4/5, EE 4/5, WE 4/5,  5/5                    RIGHT UE - ShAB 4/5, EF 4/5, EE 4/5, WE 4/5,  5/5                    LEFT    LE - HF 5/5, KE 5/5, DF 5/5, PF 5/5                    RIGHT LE - HF 5/5, KE 5/5, DF 5/5, PF 5/5        Sensory - Intact to LT     Reflexes - DTR Intact, No primitive reflexive     Coordination - FTN intact, HTS intact bilaterally     Tone - normal  Psychiatric - Mood stable, Affect WNL

## 2019-10-04 NOTE — PROGRESS NOTE ADULT - ASSESSMENT
59 year old RH F with a PMH of stroke? 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 LKN was 18:30 on 9/27.  Brain MRI revealed restricted diffusion at the left thalamus. Head and neck MRA with areas of significant constriction follow by dilation (beads in a string pattern) at  the anterior and posterior cerebral arteries noted to be concerning for vasculopathy    Impression: Cerebral thrombosis with cerebral infarction etiology likely small vessel disease, clinically does not appear to be CNS vasculitis. Suspect atherosclerotic changes in the setting of poorly controlled vascular risk factors.    NEURO: neurologically without acute change, states symptoms are improving, permissive HTN followed by gradual normotension as tolerated, LDL on admission 270- started on high intensity statin, MR imaging as noted, if able to tolerate at later time can consider repeat vessel imaging for further evaluation as outpatient. Physical therapy/OT recommend AR.     ANTITHROMBOTIC THERAPY: ASA, add Plavix 3 weeks then resume asa alone there after.     PULMONARY:  protecting airway, saturating well on room air.     CARDIOVASCULAR:  TTE: ef 75-80%, minimal MR, mild stage 1 diastolic dysfunction, minimal TR, minimal NM, small pericardial effusion adjacent to right heart. Cardiac monitoring without any recorded events.  Cardiology consult appreciated.               SBP goal: Permissive HTN to gradual normotension.     GASTROINTESTINAL:  dysphagia screen passed, tolerating diet      Diet: regular     RENAL: CKD stage 3b, good urine output. Continue to monitor.  Per Nephrology Dr. Nunez, prior to neuro angio perform IV hydration for renal protection, patient is high risk for worsening renal fxn including renal replacement therapy with IV contrast. Renal US: Cannot entirely exclude renal parenchymal disease. No hydronephrosis.     Na Goal: Greater than 135     Andrade: n    HEMATOLOGY: no active bleeding noted      DVT ppx: LMWH    ID: afebrile, no signs or symptoms of infection.     OTHER:   Given that clinically there is overall  low suspicion for CNS vasculitis, I do not believe that there is a role for further testing at this time. Spoke with pts son and grandson at bedside previously, all questions addressed. Endocrine follow up for long term glycemic control. Will arrange outpatient rheum follow-up on outpatient hospital follow-up.    DISPOSITION: Acute Rehab once bed available.     CORE MEASURES:        Admission NIHSS: 1     TPA: [] YES [x] NO      LDL/HDL: 211/49     Depression Screen: 0     Statin Therapy: y      Dysphagia Screen: [x] PASS [] FAIL     Smoking [] YES [x] NO      Afib [] YES [x] NO     Stroke Education [x] YES [] NO    Obtain screening lower extremity venous ultrasound in patients who meet 1 or more of the following criteria as patient is high risk for DVT/PE on admission:   [] History of DVT/PE  []Hypercoagulable states (Factor V Leiden, Cancer, OCP, etc. )  []Prolonged immobility (hemiplegia/hemiparesis/post operative or any other extended immobilization)  [] Transferred from outside facility (Rehab or Long term care)  [] Age </= to 50 59 year old RH F with a PMH of stroke? 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 LKN was 18:30 on 9/27.  Brain MRI revealed restricted diffusion at the left thalamus. Head and neck MRA with areas of significant constriction follow by dilation (beads in a string pattern) at  the anterior and posterior cerebral arteries noted to be concerning for vasculopathy    Impression: Cerebral thrombosis with cerebral infarction etiology likely small vessel disease, clinically does not appear to be CNS vasculitis. Suspect atherosclerotic changes in the setting of poorly controlled vascular risk factors.    NEURO: neurologically without acute change, states symptoms are improving, permissive HTN followed by gradual normotension as tolerated, LDL on admission 270- started on high intensity statin, MR imaging as noted, if able to tolerate at later time can consider repeat vessel imaging for further evaluation as outpatient. Physical therapy/OT recommend AR.     ANTITHROMBOTIC THERAPY: ASA, add Plavix 3 weeks then resume asa alone there after.     PULMONARY:  protecting airway, saturating well on room air.     CARDIOVASCULAR:  TTE: ef 75-80%, minimal MR, mild stage 1 diastolic dysfunction, minimal TR, minimal WA, small pericardial effusion adjacent to right heart. Cardiac monitoring without any recorded events.  Cardiology consult appreciated.               SBP goal: Permissive HTN to gradual normotension.     GASTROINTESTINAL:  dysphagia screen passed, tolerating diet      Diet: regular     RENAL: CKD stage 3b, good urine output. Per Nephrology Dr. Nunez, prior to neuro angio perform IV hydration for renal protection, patient is high risk for worsening renal fxn including renal replacement therapy with IV contrast. Renal US: Cannot entirely exclude renal parenchymal disease. No hydronephrosis.     Na Goal: Greater than 135     Andrade: n    HEMATOLOGY: H/H without acute change; Platelets 238.     DVT ppx: LMWH    ID: afebrile, no signs or symptoms of infection.     OTHER:   Given that clinically there is overall  low suspicion for CNS vasculitis, I do not believe that there is a role for further testing at this time. Spoke with pts son and grandson at bedside previously, all questions addressed. Endocrine follow up for long term glycemic control. Will arrange outpatient rheum follow-up on outpatient hospital follow-up.    DISPOSITION: Acute Rehab on 10/4    CORE MEASURES:        Admission NIHSS: 1     TPA: [] YES [x] NO      LDL/HDL: 211/49     Depression Screen: 0     Statin Therapy: y      Dysphagia Screen: [x] PASS [] FAIL     Smoking [] YES [x] NO      Afib [] YES [x] NO     Stroke Education [x] YES [] NO    Obtain screening lower extremity venous ultrasound in patients who meet 1 or more of the following criteria as patient is high risk for DVT/PE on admission:   [] History of DVT/PE  []Hypercoagulable states (Factor V Leiden, Cancer, OCP, etc. )  []Prolonged immobility (hemiplegia/hemiparesis/post operative or any other extended immobilization)  [] Transferred from outside facility (Rehab or Long term care)  [] Age </= to 50

## 2019-10-04 NOTE — H&P ADULT - NSHPSOCIALHISTORY_GEN_ALL_CORE
SOCIAL HISTORY  - Smoking: denied  - Alcohol: denied  - Drug Use: denied    FUNCTIONAL HISTORY  Pt lives with her adult sons in an apartment building with an elevator. There are no steps to enter the apartment building, and no stairs inside her apartment. Prior to admission, she received assistance with dressing and bathing from her sons and would ambulate with a cane. Pt stated that she receives physical therapy twice a week at home.    CURRENT FUNCTIONAL STATUS  10/2/19  Bed Mobility  Bed Mobility Training Supine-to-Sit: supervsion;  supervision;  verbal cues  Bed Mobility Training Limitations: decreased flexibility;  decreased strength;  impaired balance    Sit-Stand Transfer Training  Transfer Training Sit-to-Stand Transfer: contact guard;  verbal cues;  supervision;  1 person assist;  full weight-bearing  Transfer Training Stand-to-Sit Transfer: contact guard;  supervision;  verbal cues;  1 person assist;  full weight-bearing  Sit-to-Stand Transfer Training Transfer Safety Analysis: decreased balance;  decreased flexibility;  impaired balance;  decreased strength    Gait Training  Gait Training: minimum assist (75% patient effort);  verbal cues;  supervision;  1 person assist;  full weight-bearing   rolling walker;  40 ft  Gait Analysis: swing-through gait   decreased letitia;  decreased stride length;  decreased step length;  decreased flexibility;  decreased strength;  impaired balance;  40 ft;  rolling walker  Type of Rest Type of Rest: standing  Duration of Rest Duration of Rest: 2 mins x 2

## 2019-10-04 NOTE — PROGRESS NOTE ADULT - ASSESSMENT
59 year old RH F with a PMH of stroke? 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  rifts to the right without a cane for the past 3 years. Brain MRI revealed restricted diffusion at the left thalamus. Head and neck MRA with areas of significant constriction follow by dilation (beads in a string pattern) at  the anterior and posterior cerebral arteries concerning for vasculitis. now is being scheduled for neuro angio. pt with smooth vs borderline ckd IV  [ her weight is 118lb]     1- smooth   2- htn   3- uti  4- uncontrolled DM   5- proteinuria  subnephrotic         c3/c4/nusrat/c-anca/p-anca  neg so far.   bladder scan, renal sono non revealing   gentle ivf leading to  cr cont to improve   urine culture  pending   acidosis add sodium bicarbonate 650 mg tid   hyperglycemia control   suspect diabetic nephropathy and uncontrolled htn likely culprit for ckd as all serologies have been non revealing. pt to follow up with nephrology at rehab. I [facility]

## 2019-10-04 NOTE — PROGRESS NOTE ADULT - PROBLEM SELECTOR PLAN 1
Goal glucose 100-180  Continue with Humalog 3 with meals.  No need for basal at this time.  If going to rehab can d/c on current regimen.

## 2019-10-04 NOTE — H&P ADULT - NSHPOUTPATIENTPROVIDERS_GEN_ALL_CORE
Otis Bourne (DO)  Neurology; Vascular Neurology  [address]  [place] [address]  Phone: [phone]  Gilberto Hernandez (DO)  Cardiology; Internal Medicine  [address], [address]  [place] [address]  Phone: [phone]  Kadie Wakefield (DO)  Nephrology  Phone: [phone]  Ed Gillette (DO)  EndocrinologyMetabDiabetes; Internal Medicine  Phone: [phone]  Aura Rodriguez; MPH)  Internal Medicine; Rheumatology  Phone: [phone]

## 2019-10-04 NOTE — H&P ADULT - NSHPLABSRESULTS_GEN_ALL_CORE
.  LABS:                         9.7    4.54  )-----------( 238      ( 04 Oct 2019 08:22 )             31.5     10-04    138  |  113<H>  |  58<H>  ----------------------------<  125<H>  5.0   |  14<L>  |  1.76<H>    Ca    9.3      04 Oct 2019 07:02                    RADIOLOGY, EKG & ADDITIONAL TESTS: Reviewed.

## 2019-10-04 NOTE — PROGRESS NOTE ADULT - REASON FOR ADMISSION
Stroke

## 2019-10-04 NOTE — PROGRESS NOTE ADULT - SUBJECTIVE AND OBJECTIVE BOX
Subjective: Patient seen and examined. No new events except as noted.     REVIEW OF SYSTEMS:    CONSTITUTIONAL: No weakness, fevers or chills  EYES/ENT: No visual changes;  No vertigo or throat pain   NECK: No pain or stiffness  RESPIRATORY: No cough, wheezing, hemoptysis; No shortness of breath  CARDIOVASCULAR: No chest pain or palpitations  GASTROINTESTINAL: No abdominal or epigastric pain. No nausea, vomiting, or hematemesis; No diarrhea or constipation. No melena or hematochezia.  GENITOURINARY: No dysuria, frequency or hematuria  NEUROLOGICAL: No numbness or weakness  SKIN: No itching, burning, rashes, or lesions   All other review of systems is negative unless indicated above.    MEDICATIONS:  MEDICATIONS  (STANDING):  aspirin  chewable 81 milliGRAM(s) Oral daily  atorvastatin 80 milliGRAM(s) Oral at bedtime  clopidogrel Tablet 75 milliGRAM(s) Oral daily  dextrose 5%. 1000 milliLiter(s) (50 mL/Hr) IV Continuous <Continuous>  dextrose 50% Injectable 25 Gram(s) IV Push once  dextrose 50% Injectable 25 Gram(s) IV Push once  dextrose 50% Injectable 12.5 Gram(s) IV Push once  enoxaparin Injectable 30 milliGRAM(s) SubCutaneous daily  insulin lispro (HumaLOG) corrective regimen sliding scale   SubCutaneous three times a day before meals  insulin lispro Injectable (HumaLOG) 3 Unit(s) SubCutaneous three times a day before meals  sodium chloride 0.9%. 1000 milliLiter(s) (50 mL/Hr) IV Continuous <Continuous>      PHYSICAL EXAM:  T(C): 36.6 (10-04-19 @ 08:25), Max: 37.2 (10-03-19 @ 12:36)  HR: 83 (10-04-19 @ 08:25) (77 - 84)  BP: 145/74 (10-04-19 @ 08:25) (127/82 - 156/88)  RR: 18 (10-04-19 @ 08:25) (17 - 18)  SpO2: 100% (10-04-19 @ 08:25) (100% - 100%)  Wt(kg): --  I&O's Summary        Appearance: Normal	  HEENT:   Normal oral mucosa, PERRL, EOMI	  Lymphatic: No lymphadenopathy , no edema  Cardiovascular: Normal S1 S2, No JVD, No murmurs , Peripheral pulses palpable 2+ bilaterally  Respiratory: Lungs clear to auscultation, normal effort 	  Gastrointestinal:  Soft, Non-tender, + BS	  Skin: No rashes, No ecchymoses, No cyanosis, warm to touch  Musculoskeletal: Normal range of motion, normal strength  Psychiatry:  Mood & affect appropriate  Ext: No edema      LABS:    CARDIAC MARKERS:  CARDIAC MARKERS ( 02 Oct 2019 10:07 )  x     / x     / 57 U/L / x     / x                                    9.7    4.54  )-----------( 238      ( 04 Oct 2019 08:22 )             31.5     10-04    138  |  113<H>  |  58<H>  ----------------------------<  125<H>  5.0   |  14<L>  |  1.76<H>    Ca    9.3      04 Oct 2019 07:02      proBNP:   Lipid Profile:   HgA1c:   TSH:     5          TELEMETRY: 	 SR   ECG:  	  RADIOLOGY:   DIAGNOSTIC TESTING:  [ ] Echocardiogram:  < from: Transthoracic Echocardiogram (10.02.19 @ 07:20) >    Patient name: DEBORAH NUNEZ  YOB: 1960   Age: 59 (F)   MR#: [id number]  Study Date: 10/2/2019  [facility]  [facility] Sonographer: Chioma Gonsales RDCS  Study quality: Technically fair  Referring Physician: Vita Miller MD  Blood Pressure: 125/84 mmHg  Height: 157 cm  Weight: 54 kg  BSA: 1.5 m2  ------------------------------------------------------------------------  PROCEDURE: Transthoracic echocardiogram with 2-D, M-Mode  and complete spectral and color flow Doppler.  INDICATION: Cerebral infarction, unspecified (I63.9)  ------------------------------------------------------------------------  Dimensions:    Normal Values:  LA:     3.0    2.0 - 4.0 cm  Ao:     2.4    2.0 - 3.8 cm  SEPTUM: 1.6    0.6 - 1.2 cm  PWT:    1.3    0.6 - 1.1 cm  LVIDd:  3.4    3.0 - 5.6 cm  LVIDs:  2.0    1.8 - 4.0 cm  Derived variables:  LVMI: 115 g/m2  RWT: 0.76  Fractional short: 41 %  EF (Visual Estimate): 75-80 %  Doppler Peak Velocity (m/sec): AoV=1.1  ------------------------------------------------------------------------  Observations:  Mitral Valve: Normal mitral valve. Minimal mitral  regurgitation.  Aortic Valve/Aorta: Normal trileaflet aortic valve. Peak  transaortic valve gradient equals 5 mm Hg. No aortic valve  regurgitation seen. Intracavitary gradient of 8 mmHg.  Aortic Root: 2.4 cm.  Left Atrium: Normal left atrium.  LA volume index = 24  cc/m2.  Left Ventricle: Hyperdynamic left ventricular systolic  function. Severe concentric left ventricular hypertrophy.  Mild diastolic dysfunction (Stage I).  Right Heart: Normal right atrium. Normal right ventricular  size and function. Normal tricuspid valve. Minimal  tricuspid regurgitation. Normal pulmonic valve. Minimal  pulmonic regurgitation.  Pericardium/Pleura: Small pericardial effusion adjacent to  the right heart.  Hemodynamic: Estimated right atrial pressure equals 3 mmHg.  Inadequate tricuspid regurgitation Doppler envelope  precludes estimation of RVSP.  ------------------------------------------------------------------------  Conclusions:  1. Normal mitral valve. Minimal mitral regurgitation.  2. Normal trileaflet aortic valve. No aortic valve  regurgitation seen.  3. Severe concentric left ventricular hypertrophy.  4. Hyperdynamic left ventricular systolic function.  5. Mild diastolic dysfunction (Stage I).  6. Normal right ventricular size and function.  7. Small pericardial effusion adjacent to the right heart.  *** No previous Echo exam.  ------------------------------------------------------------------------  Confirmed on  10/2/2019 - 10:16:17 by Jorge Auguste M.D.  ------------------------------------------------------------------------    < end of copied text >    [ ]  Catheterization:  [ ] Stress Test:    OTHER:

## 2019-10-04 NOTE — DISCHARGE NOTE NURSING/CASE MANAGEMENT/SOCIAL WORK - PATIENT PORTAL LINK FT
You can access the FollowMyHealth Patient Portal offered by [facility] by registering at the following website: [email or web address]. By joining [facility]’s FollowMyHealth portal, you will also be able to view your health information using other applications (apps) compatible with our system.

## 2019-10-05 LAB
ANION GAP SERPL CALC-SCNC: 10 MMOL/L — SIGNIFICANT CHANGE UP (ref 5–17)
BUN SERPL-MCNC: 58 MG/DL — HIGH (ref 7–23)
CALCIUM SERPL-MCNC: 9.8 MG/DL — SIGNIFICANT CHANGE UP (ref 8.4–10.5)
CHLORIDE SERPL-SCNC: 111 MMOL/L — HIGH (ref 96–108)
CO2 SERPL-SCNC: 18 MMOL/L — LOW (ref 22–31)
CREAT SERPL-MCNC: 1.67 MG/DL — HIGH (ref 0.5–1.3)
GLUCOSE BLDC GLUCOMTR-MCNC: 154 MG/DL — HIGH (ref 70–99)
GLUCOSE BLDC GLUCOMTR-MCNC: 177 MG/DL — HIGH (ref 70–99)
GLUCOSE BLDC GLUCOMTR-MCNC: 214 MG/DL — HIGH (ref 70–99)
GLUCOSE SERPL-MCNC: 162 MG/DL — HIGH (ref 70–99)
HCT VFR BLD CALC: 35 % — SIGNIFICANT CHANGE UP (ref 34.5–45)
HGB BLD-MCNC: 11 G/DL — LOW (ref 11.5–15.5)
MCHC RBC-ENTMCNC: 26.3 PG — LOW (ref 27–34)
MCHC RBC-ENTMCNC: 31.4 GM/DL — LOW (ref 32–36)
MCV RBC AUTO: 83.5 FL — SIGNIFICANT CHANGE UP (ref 80–100)
NRBC # BLD: 0 /100 WBCS — SIGNIFICANT CHANGE UP (ref 0–0)
PLATELET # BLD AUTO: 247 K/UL — SIGNIFICANT CHANGE UP (ref 150–400)
POTASSIUM SERPL-MCNC: 5 MMOL/L — SIGNIFICANT CHANGE UP (ref 3.5–5.3)
POTASSIUM SERPL-SCNC: 5 MMOL/L — SIGNIFICANT CHANGE UP (ref 3.5–5.3)
RBC # BLD: 4.19 M/UL — SIGNIFICANT CHANGE UP (ref 3.8–5.2)
RBC # FLD: 13.2 % — SIGNIFICANT CHANGE UP (ref 10.3–14.5)
SODIUM SERPL-SCNC: 139 MMOL/L — SIGNIFICANT CHANGE UP (ref 135–145)
WBC # BLD: 4.93 K/UL — SIGNIFICANT CHANGE UP (ref 3.8–10.5)
WBC # FLD AUTO: 4.93 K/UL — SIGNIFICANT CHANGE UP (ref 3.8–10.5)

## 2019-10-05 PROCEDURE — 99223 1ST HOSP IP/OBS HIGH 75: CPT | Mod: GC,AI

## 2019-10-05 PROCEDURE — 99223 1ST HOSP IP/OBS HIGH 75: CPT

## 2019-10-05 RX ADMIN — ATORVASTATIN CALCIUM 80 MILLIGRAM(S): 80 TABLET, FILM COATED ORAL at 21:20

## 2019-10-05 RX ADMIN — Medication 2: at 12:05

## 2019-10-05 RX ADMIN — Medication 1: at 08:27

## 2019-10-05 RX ADMIN — Medication 81 MILLIGRAM(S): at 12:00

## 2019-10-05 RX ADMIN — Medication 1: at 16:50

## 2019-10-05 RX ADMIN — Medication 3 UNIT(S): at 08:27

## 2019-10-05 RX ADMIN — Medication 3 UNIT(S): at 16:50

## 2019-10-05 RX ADMIN — Medication 3 UNIT(S): at 12:06

## 2019-10-05 RX ADMIN — CLOPIDOGREL BISULFATE 75 MILLIGRAM(S): 75 TABLET, FILM COATED ORAL at 12:00

## 2019-10-05 RX ADMIN — ENOXAPARIN SODIUM 30 MILLIGRAM(S): 100 INJECTION SUBCUTANEOUS at 12:00

## 2019-10-06 LAB
GLUCOSE BLDC GLUCOMTR-MCNC: 157 MG/DL — HIGH (ref 70–99)
GLUCOSE BLDC GLUCOMTR-MCNC: 177 MG/DL — HIGH (ref 70–99)
GLUCOSE BLDC GLUCOMTR-MCNC: 180 MG/DL — HIGH (ref 70–99)

## 2019-10-06 PROCEDURE — 99232 SBSQ HOSP IP/OBS MODERATE 35: CPT

## 2019-10-06 PROCEDURE — 99233 SBSQ HOSP IP/OBS HIGH 50: CPT

## 2019-10-06 RX ADMIN — ENOXAPARIN SODIUM 30 MILLIGRAM(S): 100 INJECTION SUBCUTANEOUS at 11:35

## 2019-10-06 RX ADMIN — Medication 3 UNIT(S): at 16:39

## 2019-10-06 RX ADMIN — Medication 1: at 11:36

## 2019-10-06 RX ADMIN — CLOPIDOGREL BISULFATE 75 MILLIGRAM(S): 75 TABLET, FILM COATED ORAL at 11:35

## 2019-10-06 RX ADMIN — Medication 3 UNIT(S): at 08:32

## 2019-10-06 RX ADMIN — Medication 1: at 08:32

## 2019-10-06 RX ADMIN — Medication 81 MILLIGRAM(S): at 11:35

## 2019-10-06 RX ADMIN — Medication 1: at 16:39

## 2019-10-06 RX ADMIN — ATORVASTATIN CALCIUM 80 MILLIGRAM(S): 80 TABLET, FILM COATED ORAL at 21:20

## 2019-10-06 RX ADMIN — Medication 3 UNIT(S): at 11:36

## 2019-10-06 NOTE — CONSULT NOTE ADULT - ASSESSMENT
59F with HTN, HLD, DM II and prior CVA admitted to [facility] after suffering from another Acute CVA of her L. Thalamus on 9/29/2019.  Imaging was concerned for possible rheumatologic cause vs. small vessel disease.    Acute CVA   MRI Confirms L. Thalamic Stroke (9/29/2019 )   Aspirin + Plavix + Atorvastatin   Echocardiogram with normal EF, LVH and Grade I diastolic dysfunction (10/2/2019)  Last LDL= 270 (9/30/2019) ;  Last A1C = 9.6 (9/30/2019)  Out of permissive HTN window; Control BP more tightly   Continue with PT/OT/SLT; Follow along with Neurology   Cardiology waiting for completion of Rheumatology workup before proceeding with implantable device monitoring;  Consult Rheumatology for pending workup    Diabetes Mellitus 2  Currently on on sliding and premeal; Last A1C = 9.6 (9/30/2019)  Endocrine reommends no Basal for now; Will monitor FS here QACHS and maintain BS <180 with ISS.    HTN / HLD   Controlled; Currently not on any anti-hypertensives but should consider starting on Lisinopril if Creatinine remains stable  Atorvastatin    CKD Stage 4  Creatinine at baseline  Renally dose all medications and avoid Nephrotoxic agents   Cautious with any contrast studies   Monitor BMP and electrolytes     Diet  Diabetic     DVT Prophylaxis  Lovenox    Disposition  Full Code/Inpatient  Discharge planning pending hospital course

## 2019-10-06 NOTE — CONSULT NOTE ADULT - SUBJECTIVE AND OBJECTIVE BOX
HPI: 59F with HTN, DM II and prior CVA admitted to [facility] after suffering from another Acute CVA of her L. Thalamus on 9/29/2019.  Imaging was concerned for possible rheumatologic cause vs. small vessel disease. For full details here is the HPI from Rehab physician:     "Pt is a 60 y/o RH F with PMHx of stroke? 3 years ago (presented with right hemiparesis/dysarthria), HTN, T2DM (with retinopathy?), glaucoma? with right eye blindness, and hearing loss (has hearing aids) who presented with right hand weakness/numbness and right mouth numbness to [facility] on 9/29/19. LKN was 18:30 on 9/27.  Brain MRI revealed acute infarct at the left thalamus. Head and neck MRA with areas of significant constriction follow by dilation (beads in a string pattern) at the anterior and posterior cerebral arteries noted to be concerning for vasculitis. Rheumatology was consulted for concerns of vasculitis, but her workup was unrevealing. Per Neuro, etiology of stroke likely small vessel disease vs less likely CNS vasculitis as they suspect atherosclerotic changes in the setting of poorly controlled vascular risk factors."    REVIEW OF SYSTEMS:  CONSTITUTIONAL: No fever, weight loss, or fatigue  EYES: No eye pain, visual disturbances, or discharge  ENMT:  No difficulty hearing, tinnitus, vertigo; No sinus or throat pain  NECK: No pain or stiffness  RESPIRATORY: No cough, wheezing, chills or hemoptysis; No shortness of breath  CARDIOVASCULAR: No chest pain, palpitations, dizziness, or leg swelling  GASTROINTESTINAL: No abdominal or epigastric pain. No nausea, vomiting, or hematemesis; No diarrhea or constipation. No melena or hematochezia.  GENITOURINARY: No dysuria, frequency, hematuria, or incontinence  NEUROLOGICAL: No headaches, memory loss, loss of strength, numbness, or tremors  MUSCULOSKELETAL: No muscle or back pain      PAST MEDICAL & SURGICAL HISTORY:  CVA (cerebral vascular accident)  Hypertension  Diabetes mellitus  Diabetes  No significant past surgical history      SOCIAL HISTORY:  Tobacco; EtOH; Illicit Drugs    Allergies    No Known Allergies    Intolerances        MEDICATIONS  (STANDING):  aspirin  chewable 81 milliGRAM(s) Oral daily  atorvastatin 80 milliGRAM(s) Oral at bedtime  clopidogrel Tablet 75 milliGRAM(s) Oral daily  dextrose 5%. 1000 milliLiter(s) (50 mL/Hr) IV Continuous <Continuous>  dextrose 50% Injectable 12.5 Gram(s) IV Push once  dextrose 50% Injectable 25 Gram(s) IV Push once  enoxaparin Injectable 30 milliGRAM(s) SubCutaneous daily  insulin lispro (HumaLOG) corrective regimen sliding scale   SubCutaneous three times a day before meals  insulin lispro Injectable (HumaLOG) 3 Unit(s) SubCutaneous three times a day before meals    MEDICATIONS  (PRN):  dextrose 40% Gel 15 Gram(s) Oral once PRN Blood Glucose LESS THAN 70 milliGRAM(s)/deciliter  glucagon  Injectable 1 milliGRAM(s) IntraMuscular once PRN Glucose LESS THAN 70 milligrams/deciliter      FAMILY HISTORY:  Family history of cerebrovascular accident (CVA)      Vital Signs Last 24 Hrs  T(C): 36.8 (06 Oct 2019 08:24), Max: 36.8 (06 Oct 2019 08:24)  T(F): 98.2 (06 Oct 2019 08:24), Max: 98.2 (06 Oct 2019 08:24)  HR: 78 (06 Oct 2019 08:24) (77 - 78)  BP: 146/92 (06 Oct 2019 08:24) (146/92 - 152/86)  BP(mean): --  RR: 14 (06 Oct 2019 08:24) (14 - 16)  SpO2: 100% (06 Oct 2019 08:24) (100% - 100%)    PHYSICAL EXAM:    GENERAL: NAD, well-developed  HEAD:  Atraumatic, Normocephalic  EYES: EOMI, PERRLA, conjunctiva and sclera clear  ENMT: No tonsillar erythema, exudates, or enlargement; Moist mucous membranes, Good dentition, No lesions  NECK: Supple, No JVD, Normal thyroid  NERVOUS SYSTEM:  Alert & Oriented X3, Good concentration;  CHEST/LUNG: Clear to auscultation bilaterally; No rales, rhonchi, wheezing, or rubs  HEART: Regular rate and rhythm; No murmurs, rubs, or gallops  ABDOMEN: Soft, Nontender, Nondistended; Bowel sounds present  EXTREMITIES:  2+ Peripheral Pulses, No clubbing, cyanosis, or edema      LABS:                        11.0   4.93  )-----------( 247      ( 05 Oct 2019 06:30 )             35.0     10-05    139  |  111<H>  |  58<H>  ----------------------------<  162<H>  5.0   |  18<L>  |  1.67<H>    Ca    9.8      05 Oct 2019 06:30          CAPILLARY BLOOD GLUCOSE      POCT Blood Glucose.: 180 mg/dL (06 Oct 2019 11:34)      RADIOLOGY & ADDITIONAL STUDIES:    EKG:   Personally Reviewed:  [ ] YES     Imaging:   Personally Reviewed:  [ ] YES     Consultant(s) Notes Reviewed:      Care Discussed with Consultants/Other Providers:

## 2019-10-06 NOTE — PROGRESS NOTE ADULT - SUBJECTIVE AND OBJECTIVE BOX
No overnight events.  Patient feels good this AM.   had a good therapy session yesterday and did have an opportunity to walk.   Reports no pain. Noting her right hand and facial numbness are improving.     REVIEW OF SYSTEMS  Constitutional - No fever,  No fatigue  Neurological - No headaches, +loss of strength  Musculoskeletal - No joint pain, No joint swelling, No muscle pain    VITALS  T(C): 36.5 (10-05-19 @ 21:22), Max: 36.5 (10-05-19 @ 21:22)  HR: 77 (10-05-19 @ 21:22) (77 - 77)  BP: 152/86 (10-05-19 @ 21:22) (152/86 - 152/86)  RR: 16 (10-05-19 @ 21:22) (16 - 16)  SpO2: 100% (10-05-19 @ 21:22) (100% - 100%)  Wt(kg): --       MEDICATIONS   aspirin  chewable 81 milliGRAM(s) daily  atorvastatin 80 milliGRAM(s) at bedtime  clopidogrel Tablet 75 milliGRAM(s) daily  dextrose 40% Gel 15 Gram(s) once PRN  dextrose 5%. 1000 milliLiter(s) <Continuous>  dextrose 50% Injectable 12.5 Gram(s) once  dextrose 50% Injectable 25 Gram(s) once  enoxaparin Injectable 30 milliGRAM(s) daily  glucagon  Injectable 1 milliGRAM(s) once PRN  insulin lispro (HumaLOG) corrective regimen sliding scale   three times a day before meals  insulin lispro Injectable (HumaLOG) 3 Unit(s) three times a day before meals      RECENT LABS/IMAGING                        11.0   4.93  )-----------( 247      ( 05 Oct 2019 06:30 )             35.0     10-05    139  |  111<H>  |  58<H>  ----------------------------<  162<H>  5.0   |  18<L>  |  1.67<H>    Ca    9.8      05 Oct 2019 06:30                  POCT Blood Glucose.: 177 mg/dL (10-05-19 @ 16:46)  POCT Blood Glucose.: 214 mg/dL (10-05-19 @ 12:03)    ---------  PHYSICAL EXAM  Constitutional - NAD, Comfortable  Pulm - Breathing comfortably, No wheezing  Abd - Soft, NTND  Extremities - No edema, No calf tenderness  Neurologic Exam -                    Cognitive - Awake, Alert     Communication - Word finding difficulties, Dysarthria     Motor - Right hemiparesis     Sensory - Intact to LT  Psychiatric - Mood WNL, Affect WNL    ASSESSMENT/PLAN  59y Female with functional deficits after CVA  CVA - ASA, Plavix, Lipitor  Pain - Tylenol PRN  DVT PPX - Lovenox    Continue 3hrs a day of comprehensive rehab program.

## 2019-10-07 LAB
CULTURE RESULTS: SIGNIFICANT CHANGE UP
GLUCOSE BLDC GLUCOMTR-MCNC: 147 MG/DL — HIGH (ref 70–99)
GLUCOSE BLDC GLUCOMTR-MCNC: 186 MG/DL — HIGH (ref 70–99)
GLUCOSE BLDC GLUCOMTR-MCNC: 191 MG/DL — HIGH (ref 70–99)
SPECIMEN SOURCE: SIGNIFICANT CHANGE UP

## 2019-10-07 PROCEDURE — 99232 SBSQ HOSP IP/OBS MODERATE 35: CPT

## 2019-10-07 RX ORDER — INSULIN GLARGINE 100 [IU]/ML
15 INJECTION, SOLUTION SUBCUTANEOUS AT BEDTIME
Refills: 0 | Status: DISCONTINUED | OUTPATIENT
Start: 2019-10-07 | End: 2019-10-12

## 2019-10-07 RX ORDER — DORZOLAMIDE HYDROCHLORIDE 20 MG/ML
1 SOLUTION/ DROPS OPHTHALMIC
Refills: 0 | Status: DISCONTINUED | OUTPATIENT
Start: 2019-10-07 | End: 2019-10-07

## 2019-10-07 RX ORDER — DORZOLAMIDE HYDROCHLORIDE TIMOLOL MALEATE 20; 5 MG/ML; MG/ML
1 SOLUTION/ DROPS OPHTHALMIC
Refills: 0 | Status: DISCONTINUED | OUTPATIENT
Start: 2019-10-07 | End: 2019-10-12

## 2019-10-07 RX ORDER — LATANOPROST 0.05 MG/ML
1 SOLUTION/ DROPS OPHTHALMIC; TOPICAL AT BEDTIME
Refills: 0 | Status: DISCONTINUED | OUTPATIENT
Start: 2019-10-07 | End: 2019-10-12

## 2019-10-07 RX ORDER — BRIMONIDINE TARTRATE 2 MG/MG
1 SOLUTION/ DROPS OPHTHALMIC THREE TIMES A DAY
Refills: 0 | Status: DISCONTINUED | OUTPATIENT
Start: 2019-10-07 | End: 2019-10-12

## 2019-10-07 RX ORDER — SODIUM BICARBONATE 1 MEQ/ML
650 SYRINGE (ML) INTRAVENOUS
Refills: 0 | Status: DISCONTINUED | OUTPATIENT
Start: 2019-10-07 | End: 2019-10-12

## 2019-10-07 RX ADMIN — BRIMONIDINE TARTRATE 1 DROP(S): 2 SOLUTION/ DROPS OPHTHALMIC at 13:11

## 2019-10-07 RX ADMIN — BRIMONIDINE TARTRATE 1 DROP(S): 2 SOLUTION/ DROPS OPHTHALMIC at 20:38

## 2019-10-07 RX ADMIN — DORZOLAMIDE HYDROCHLORIDE TIMOLOL MALEATE 1 DROP(S): 20; 5 SOLUTION/ DROPS OPHTHALMIC at 20:25

## 2019-10-07 RX ADMIN — LATANOPROST 1 DROP(S): 0.05 SOLUTION/ DROPS OPHTHALMIC; TOPICAL at 20:38

## 2019-10-07 RX ADMIN — INSULIN GLARGINE 15 UNIT(S): 100 INJECTION, SOLUTION SUBCUTANEOUS at 20:37

## 2019-10-07 RX ADMIN — Medication 3 UNIT(S): at 07:36

## 2019-10-07 RX ADMIN — ENOXAPARIN SODIUM 30 MILLIGRAM(S): 100 INJECTION SUBCUTANEOUS at 11:11

## 2019-10-07 RX ADMIN — Medication 81 MILLIGRAM(S): at 11:11

## 2019-10-07 RX ADMIN — Medication 0: at 16:32

## 2019-10-07 RX ADMIN — CLOPIDOGREL BISULFATE 75 MILLIGRAM(S): 75 TABLET, FILM COATED ORAL at 11:11

## 2019-10-07 RX ADMIN — Medication 1: at 12:13

## 2019-10-07 RX ADMIN — ATORVASTATIN CALCIUM 80 MILLIGRAM(S): 80 TABLET, FILM COATED ORAL at 20:37

## 2019-10-07 RX ADMIN — Medication 1: at 07:36

## 2019-10-07 NOTE — PROGRESS NOTE ADULT - ASSESSMENT
59F with HTN, HLD, DM II and prior CVA admitted to [facility] after suffering from another Acute CVA of her L. Thalamus on 9/29/2019.  Imaging was concerned for possible rheumatologic cause vs. small vessel disease.    Acute CVA   MRI Confirms L. Thalamic Stroke (9/29/2019 )   Aspirin + Plavix + Atorvastatin   Echocardiogram with normal EF, LVH and Grade I diastolic dysfunction (10/2/2019)  Last LDL= 270 (9/30/2019) ;  Last A1C = 9.6 (9/30/2019)  Out of permissive HTN window; Control BP more tightly   Continue with PT/OT/SLT; Follow along with Neurology   Cardiology waiting for completion of Rheumatology workup before proceeding with implantable device monitoring;  Consult Rheumatology for pending workup    Diabetes Mellitus 2  Currently on on sliding and premeal; Last A1C = 9.6 (9/30/2019)  Start Lantus 15U Bedtime; Stop Premeal and continue ISS  Will monitor FS here QACHS and maintain BS <180    HTN / HLD   Controlled; Currently not on any anti-hypertensives but should consider starting on Lisinopril if Creatinine remains stable  Atorvastatin    CKD Stage 4  Creatinine at baseline  Renally dose all medications and avoid Nephrotoxic agents   Cautious with any contrast studies   Monitor BMP and electrolytes     Diet  Diabetic     DVT Prophylaxis  Lovenox    Disposition  Full Code/Inpatient  Discharge planning pending hospital course

## 2019-10-07 NOTE — CHART NOTE - NSCHARTNOTEFT_GEN_A_CORE
Upon Nutritional Assessment by the Registered Dietitian Your Patient was Determined to Meet criteria/has Evidence of the Following Diagnosis:          [X]  Chronic Severe Protein Calorie Malnutrition    Findings as based on:  [X] Comprehensive Nutrition Assessment   [X] Nutrition Focused Physical Exam - Temporalis, Orbital Fat Pads, Buccal Fat Pads, Quadricep & Gastrocnemius(Calf) Wasting/Depletion Observed  [X] Other: Significant Weight Decrease Over Last 6 Months     Nutrition Plan/Recommendations:    1) Glucerna 8oz PO TID (Provides 660kcal-30grams of Protein)     PROVIDER Section:   By Signing This Assessment You Are Acknowledging & Agree with The Diagnosis Assigned by the Registered Dietitian    Marko Klein RDN

## 2019-10-07 NOTE — PROGRESS NOTE ADULT - SUBJECTIVE AND OBJECTIVE BOX
DAILY PROGRESS NOTE:  HPI: 59F RHD pmh stroke? 3 years ago (presented with right hemiparesis/dysarthria), HTN, T2DM (with retinopathy?), glaucoma? with right eye blindness, and hearing loss (has hearing aids) who presented with right hand weakness/numbness and right mouth numbness to [facility] on 9/29/19. LKN was 18:30 on 9/27.  Brain MRI revealed acute infarct at the left thalamus. Head and neck MRA with areas of significant constriction follow by dilation (beads in a string pattern) at the anterior and posterior cerebral arteries noted to be concerning for vasculitis. Rheumatology was consulted for concerns of vasculitis, but her workup was unrevealing. Per Neuro, etiology of stroke likely small vessel disease vs less likely CNS vasculitis as they suspect atherosclerotic changes in the setting of poorly controlled vascular risk factors. (04 Oct 2019 13:12)    Subjective: Patient seen and examined at bedside. Reports swelling of her feet since being in the hospital. Also asking if she can take her home medications that she has in her purse. Discussed patient's medication regimen and kidney function with her, explained all medicines would be provided by nurse. All of patients questions and concerns were addressed, patient demonstrated understanding and agreement with plan.     ROS:  + R eye blind  + Right sided weakness  + R hand numbness  No chest pain, sob, nausea, vomiting, diarrhea, constipation or incontinence    Physical Exam:  Vital Signs Last 24 Hrs  T(C): 36.7 (07 Oct 2019 07:32), Max: 36.7 (06 Oct 2019 21:19)  T(F): 98.1 (07 Oct 2019 07:32), Max: 98.1 (07 Oct 2019 07:32)  HR: 79 (07 Oct 2019 07:32) (75 - 79)  BP: 147/90 (07 Oct 2019 07:32) (146/90 - 147/90)  BP(mean): --  RR: 14 (07 Oct 2019 07:32) (14 - 14)  SpO2: 100% (07 Oct 2019 07:32) (100% - 100%)    Constitutional - NAD, Comfortable  Eyes - Right eye blind  Chest - CTAB  Cardiovascular - RRR, S1S2, no m/r/g  Abdomen - BS+, Soft, NTND  Extremities - No C/C/E, No calf tenderness   Neurologic Exam -                    Cognitive - Awake, Alert, AAO to self, place, date, year, situation     Communication - Fluent, No dysarthria     Motor - No focal deficits  	                  LEFT    UE - ShAB 4/5, EF 4/5, EE 4/5, WE 4/5,  5/5  	                  RIGHT UE - ShAB 4/5, EF 4/5, EE 4/5, WE 4/5,  5/5  	                  LEFT    LE - HF 5/5, KE 5/5, DF 5/5, PF 5/5  	        RIGHT LE - HF 5/5, KE 5/5, DF 5/5, PF 5/5        Sensory - Intact to LT     Reflexes - 2+ b/l patellar, symmetric  Psychiatric - Mood stable, Affect WNL    Recent Labs/Imaging:  Complete Blood Count in AM (10.05.19 @ 06:30)    Nucleated RBC: 0 /100 WBCs    WBC Count: 4.93 K/uL    RBC Count: 4.19 M/uL    Hemoglobin: 11.0 g/dL    Hematocrit: 35.0 %    Mean Cell Volume: 83.5 fl    Mean Cell Hemoglobin: 26.3 pg    Mean Cell Hemoglobin Conc: 31.4 gm/dL    Red Cell Distrib Width: 13.2 %    Platelet Count - Automated: 247 K/uL    Basic Metabolic Panel in AM (10.05.19 @ 06:30)    Sodium, Serum: 139 mmol/L    Potassium, Serum: 5.0 mmol/L    Chloride, Serum: 111 mmol/L    Carbon Dioxide, Serum: 18 mmol/L    Anion Gap, Serum: 10 mmol/L    Blood Urea Nitrogen, Serum: 58 mg/dL    Creatinine, Serum: 1.67 mg/dL    Glucose, Serum: 162 mg/dL    Calcium, Total Serum: 9.8 mg/dL    eGFR if Non : 33    POCT Blood Glucose.: 186 mg/dL (10-07-19 @ 07:34)  POCT Blood Glucose.: 157 mg/dL (10-06-19 @ 16:37)  POCT Blood Glucose.: 180 mg/dL (10-06-19 @ 11:34)    MEDICATIONS  (STANDING):  aspirin  chewable 81 milliGRAM(s) Oral daily  atorvastatin 80 milliGRAM(s) Oral at bedtime  brimonidine 0.2% Ophthalmic Solution 1 Drop(s) Both EYES three times a day  clopidogrel Tablet 75 milliGRAM(s) Oral daily  dextrose 5%. 1000 milliLiter(s) (50 mL/Hr) IV Continuous <Continuous>  dextrose 50% Injectable 12.5 Gram(s) IV Push once  dextrose 50% Injectable 25 Gram(s) IV Push once  dorzolamide 2% Ophthalmic Solution 1 Drop(s) Right EYE <User Schedule>  enoxaparin Injectable 30 milliGRAM(s) SubCutaneous daily  insulin lispro (HumaLOG) corrective regimen sliding scale   SubCutaneous three times a day before meals  insulin lispro Injectable (HumaLOG) 3 Unit(s) SubCutaneous three times a day before meals  latanoprost 0.005% Ophthalmic Solution 1 Drop(s) Right EYE at bedtime    MEDICATIONS  (PRN):  dextrose 40% Gel 15 Gram(s) Oral once PRN Blood Glucose LESS THAN 70 milliGRAM(s)/deciliter  glucagon  Injectable 1 milliGRAM(s) IntraMuscular once PRN Glucose LESS THAN 70 milligrams/deciliter    Assessment/Plan:  DEBORAH NUNEZ is a 59F pmh stroke? 3 years ago (presented with right hemiparesis/dysarthria), HTN, T2DM (with retinopathy?), glaucoma with right eye blindness, and hearing loss (has hearing aids) who presented with right hand weakness/numbness and right mouth numbness to [facility] on 9/29/19 found to have an acute left thalamic infarct, likely secondary to small vessel disease vs CNS vasculitis.    CVA  - left thalamic infarct with right hand and mouth weakness and numbness  - continue Aspirin  - continue Plavix for a total of 3 weeks (until 10/23)  - continue statin    Rehab: Continue PT/OT/SLT    Hypertension: Home med Lisinopril/HCTZ  - Gradual normotension, BP ranging 140's/90's  - Will hold off on restarting Lisinopril/HCTZ due to renal function  - Hospitalist consult appreciated    Glaucoma: Continue Latanoprost, Dorzolamide and Brimonidine gtt    BREANNE on CKD Stage III:   - Creatinine improving, unclear what her baseline is  - Hold Lisinopril/HCTZ  - Hospitalist input appreciated    DM: A1c 9.6%, patient took insuline and janumet at home.   - Endocrine following at [facility]  - FS well controlled on current regimen  - Continue Lispro 3U TID before meals  - Continue sliding scale    DVT ppx: Lovenox  Diet: Consistent Carb  Dispo: TBD DAILY PROGRESS NOTE:  HPI: 59F RHD pmh stroke? 3 years ago (presented with right hemiparesis/dysarthria), HTN, T2DM (with retinopathy?), glaucoma? with right eye blindness, and hearing loss (has hearing aids) who presented with right hand weakness/numbness and right mouth numbness to [facility] on 9/29/19. LKN was 18:30 on 9/27.  Brain MRI revealed acute infarct at the left thalamus. Head and neck MRA with areas of significant constriction follow by dilation (beads in a string pattern) at the anterior and posterior cerebral arteries noted to be concerning for vasculitis. Rheumatology was consulted for concerns of vasculitis, but her workup was unrevealing. Per Neuro, etiology of stroke likely small vessel disease vs less likely CNS vasculitis as they suspect atherosclerotic changes in the setting of poorly controlled vascular risk factors. (04 Oct 2019 13:12)    Subjective: Patient seen and examined at bedside. Reports swelling of her feet since being in the hospital. Also asking if she can take her home medications that she has in her purse. Discussed patient's medication regimen and kidney function with her, explained all medicines would be provided by nurse. All of patients questions and concerns were addressed, patient demonstrated understanding and agreement with plan.     ROS:  + R eye blind  + Right sided weakness  + R hand numbness  No chest pain, sob, nausea, vomiting, diarrhea, constipation or incontinence    Physical Exam:  Vital Signs Last 24 Hrs  T(C): 36.7 (07 Oct 2019 07:32), Max: 36.7 (06 Oct 2019 21:19)  T(F): 98.1 (07 Oct 2019 07:32), Max: 98.1 (07 Oct 2019 07:32)  HR: 79 (07 Oct 2019 07:32) (75 - 79)  BP: 147/90 (07 Oct 2019 07:32) (146/90 - 147/90)  BP(mean): --  RR: 14 (07 Oct 2019 07:32) (14 - 14)  SpO2: 100% (07 Oct 2019 07:32) (100% - 100%)    Constitutional - NAD, Comfortable  Eyes - Right eye blind  Chest - CTAB  Cardiovascular - RRR, S1S2, no m/r/g  Abdomen - BS+, Soft, NTND  Extremities - No C/C/E, No calf tenderness   Neurologic Exam -                    Cognitive - Awake, Alert, AAO to self, place, date, year, situation     Communication - Fluent, No dysarthria     Motor - No focal deficits  	                  LEFT    UE - ShAB 4/5, EF 4/5, EE 4/5, WE 4/5,  5/5  	                  RIGHT UE - ShAB 4/5, EF 4/5, EE 4/5, WE 4/5,  5/5  	                  LEFT    LE - HF 5/5, KE 5/5, DF 5/5, PF 5/5  	        RIGHT LE - HF 5/5, KE 5/5, DF 5/5, PF 5/5        Sensory - Intact to LT     Reflexes - 2+ b/l patellar, symmetric  Psychiatric - Mood stable, Affect WNL    Recent Labs/Imaging:  Complete Blood Count in AM (10.05.19 @ 06:30)    Nucleated RBC: 0 /100 WBCs    WBC Count: 4.93 K/uL    RBC Count: 4.19 M/uL    Hemoglobin: 11.0 g/dL    Hematocrit: 35.0 %    Mean Cell Volume: 83.5 fl    Mean Cell Hemoglobin: 26.3 pg    Mean Cell Hemoglobin Conc: 31.4 gm/dL    Red Cell Distrib Width: 13.2 %    Platelet Count - Automated: 247 K/uL    Basic Metabolic Panel in AM (10.05.19 @ 06:30)    Sodium, Serum: 139 mmol/L    Potassium, Serum: 5.0 mmol/L    Chloride, Serum: 111 mmol/L    Carbon Dioxide, Serum: 18 mmol/L    Anion Gap, Serum: 10 mmol/L    Blood Urea Nitrogen, Serum: 58 mg/dL    Creatinine, Serum: 1.67 mg/dL    Glucose, Serum: 162 mg/dL    Calcium, Total Serum: 9.8 mg/dL    eGFR if Non : 33    POCT Blood Glucose.: 186 mg/dL (10-07-19 @ 07:34)  POCT Blood Glucose.: 157 mg/dL (10-06-19 @ 16:37)  POCT Blood Glucose.: 180 mg/dL (10-06-19 @ 11:34)    MEDICATIONS  (STANDING):  aspirin  chewable 81 milliGRAM(s) Oral daily  atorvastatin 80 milliGRAM(s) Oral at bedtime  brimonidine 0.2% Ophthalmic Solution 1 Drop(s) Both EYES three times a day  clopidogrel Tablet 75 milliGRAM(s) Oral daily  dextrose 5%. 1000 milliLiter(s) (50 mL/Hr) IV Continuous <Continuous>  dextrose 50% Injectable 12.5 Gram(s) IV Push once  dextrose 50% Injectable 25 Gram(s) IV Push once  dorzolamide 2% Ophthalmic Solution 1 Drop(s) Right EYE <User Schedule>  enoxaparin Injectable 30 milliGRAM(s) SubCutaneous daily  insulin lispro (HumaLOG) corrective regimen sliding scale   SubCutaneous three times a day before meals  insulin lispro Injectable (HumaLOG) 3 Unit(s) SubCutaneous three times a day before meals  latanoprost 0.005% Ophthalmic Solution 1 Drop(s) Right EYE at bedtime    MEDICATIONS  (PRN):  dextrose 40% Gel 15 Gram(s) Oral once PRN Blood Glucose LESS THAN 70 milliGRAM(s)/deciliter  glucagon  Injectable 1 milliGRAM(s) IntraMuscular once PRN Glucose LESS THAN 70 milligrams/deciliter    Assessment/Plan:  DEBORAH NUNEZ is a 59F pmh stroke? 3 years ago (presented with right hemiparesis/dysarthria), HTN, T2DM (with retinopathy?), glaucoma with right eye blindness, and hearing loss (has hearing aids) who presented with right hand weakness/numbness and right mouth numbness to [facility] on 9/29/19 found to have an acute left thalamic infarct, likely secondary to small vessel disease vs CNS vasculitis.    CVA  - left thalamic infarct with right hand and mouth weakness and numbness  - continue Aspirin  - continue Plavix for a total of 3 weeks (until 10/23)  - continue statin    Rehab: Continue PT/OT/SLT    Hypertension: Home med Lisinopril/HCTZ  - Gradual normotension, BP ranging 140's/90's  - Will hold off on restarting Lisinopril/HCTZ due to renal function  - Hospitalist consult appreciated    Glaucoma: Continue Latanoprost, Dorzolamide and Brimonidine gtt    BREANNE on CKD Stage III:   - Creatinine improving, unclear what her baseline is  - Hold Lisinopril/HCTZ  - Hospitalist input appreciated    DM: A1c 9.6%, patient took insuline and janumet at home.   - Endocrine following at [facility]  - FS elevated-- hospitalist started on Lantus 15 units  - Continue Lispro 3U TID before meals  - Continue sliding scale    DVT ppx: Lovenox  Diet: Consistent Carb  Dispo: TBD

## 2019-10-07 NOTE — PROGRESS NOTE ADULT - ATTENDING COMMENTS
Pt. seen with resident.  Agree with documentation above as per resident with amendments made as appropriate. Patient medically stable. Making progress towards rehab goals.     FS elevated-- d/w hospitalist--  started on Lantus 15 units    HTN--bp controlled off medications-- cont. to monitor--goal -150    Glaucoma-- medications adjusted

## 2019-10-07 NOTE — DIETITIAN INITIAL EVALUATION ADULT. - DIET TYPE
on Consistent Carbohydrate Diet w/ Thin Liquids  Recommend Initiate Glucerna 8oz PO TID  Education Provided on Need for Supplementation  Patient Does Tries to Follow Low Sodium Diet @Home But Doesn't Eat Any Particular Way to Manage Her DM & Does Take Vitamin B12 Supplements Occasionally @Home supplement (specify)/Recommend Initiate Glucerna 8oz PO TID/consistent carbohydrate (no snacks)

## 2019-10-07 NOTE — CONSULT NOTE ADULT - SUBJECTIVE AND OBJECTIVE BOX
NEPHROLOGY CONSULTATION    CHIEF COMPLAINT: CVA    HPI:  Pt is 58 yo F with PMH of stroke 3 years ago (presented with right hemiparesis/dysarthria), CKD, HTN, T2DM, right eye blindness, and hearing loss (has hearing aids) who presented with right hand weakness/numbness and right mouth numbness to [facility] on 9/29/19. Brain MRI revealed acute infarct at the left thalamus. Head and neck MRA with areas of significant constriction follow by dilation (beads in a string pattern) at the anterior and posterior cerebral arteries noted to be concerning for vasculitis. Rheumatology was consulted for concerns of vasculitis, but her workup was unrevealing. Adm to [facility] 10/4/19.    ROS:  as above    Allergies:  No Known Allergies    PAST MEDICAL & SURGICAL HISTORY:  CVA (cerebral vascular accident)  Hypertension  Diabetes mellitus  Diabetes  CKD  No significant past surgical history    SOCIAL HISTORY:  negative    FAMILY HISTORY:  Family history of cerebrovascular accident (CVA)    MEDICATIONS  (STANDING):  aspirin  chewable 81 milliGRAM(s) Oral daily  atorvastatin 80 milliGRAM(s) Oral at bedtime  brimonidine 0.2% Ophthalmic Solution 1 Drop(s) Both EYES three times a day  clopidogrel Tablet 75 milliGRAM(s) Oral daily  dextrose 5%. 1000 milliLiter(s) (50 mL/Hr) IV Continuous <Continuous>  dextrose 50% Injectable 12.5 Gram(s) IV Push once  dextrose 50% Injectable 25 Gram(s) IV Push once  dorzolamide 2% Ophthalmic Solution 1 Drop(s) Right EYE <User Schedule>  enoxaparin Injectable 30 milliGRAM(s) SubCutaneous daily  insulin glargine Injectable (LANTUS) 15 Unit(s) SubCutaneous at bedtime  insulin lispro (HumaLOG) corrective regimen sliding scale   SubCutaneous three times a day before meals  latanoprost 0.005% Ophthalmic Solution 1 Drop(s) Right EYE at bedtime    Vital Signs Last 24 Hrs  T(C): 36.7 (10-07-19 @ 07:32), Max: 36.7 (10-06-19 @ 21:19)  T(F): 98.1 (10-07-19 @ 07:32), Max: 98.1 (10-07-19 @ 07:32)  HR: 79 (10-07-19 @ 07:32) (75 - 79)  BP: 147/90 (10-07-19 @ 07:32) (146/90 - 147/90)  RR: 14 (10-07-19 @ 07:32) (14 - 14)  SpO2: 100% (10-07-19 @ 07:32) (100% - 100%)    A/P: NEPHROLOGY CONSULTATION    CHIEF COMPLAINT: CVA    HPI:  Pt is 58 yo F with PMH of stroke 3 years ago (presented with right hemiparesis/dysarthria), CKD 3, HTN, T2DM, right eye blindness, and hearing loss (has hearing aids) who presented with right hand weakness/numbness and right mouth numbness to [facility] on 9/29/19. Brain MRI revealed acute infarct at the left thalamus. Head and neck MRA with areas of significant constriction follow by dilation (beads in a string pattern) at the anterior and posterior cerebral arteries noted to be concerning for vasculitis. Rheumatology was consulted for concerns of vasculitis, but workup was unrevealing. Adm to [facility] 10/4/19. Awake, alert, denies CP, SOB, N/V/D/C/F/C.    ROS:  as above    Allergies:  No Known Allergies    PAST MEDICAL & SURGICAL HISTORY:  CVA (cerebral vascular accident)  Hypertension  Diabetes mellitus  Diabetes  CKD  No significant past surgical history    SOCIAL HISTORY:  negative    FAMILY HISTORY:  Family history of cerebrovascular accident (CVA)    MEDICATIONS  (STANDING):  aspirin  chewable 81 milliGRAM(s) Oral daily  atorvastatin 80 milliGRAM(s) Oral at bedtime  brimonidine 0.2% Ophthalmic Solution 1 Drop(s) Both EYES three times a day  clopidogrel Tablet 75 milliGRAM(s) Oral daily  dextrose 5%. 1000 milliLiter(s) (50 mL/Hr) IV Continuous <Continuous>  dextrose 50% Injectable 12.5 Gram(s) IV Push once  dextrose 50% Injectable 25 Gram(s) IV Push once  dorzolamide 2% Ophthalmic Solution 1 Drop(s) Right EYE <User Schedule>  enoxaparin Injectable 30 milliGRAM(s) SubCutaneous daily  insulin glargine Injectable (LANTUS) 15 Unit(s) SubCutaneous at bedtime  insulin lispro (HumaLOG) corrective regimen sliding scale   SubCutaneous three times a day before meals  latanoprost 0.005% Ophthalmic Solution 1 Drop(s) Right EYE at bedtime    Vital Signs Last 24 Hrs  T(C): 36.7 (10-07-19 @ 07:32), Max: 36.7 (10-06-19 @ 21:19)  T(F): 98.1 (10-07-19 @ 07:32), Max: 98.1 (10-07-19 @ 07:32)  HR: 79 (10-07-19 @ 07:32) (75 - 79)  BP: 147/90 (10-07-19 @ 07:32) (146/90 - 147/90)  RR: 14 (10-07-19 @ 07:32) (14 - 14)  SpO2: 100% (10-07-19 @ 07:32) (100% - 100%)    Card S1S2  Lungs b/l air entry  Abd soft, NT, ND  Extr no edema    A/P:    S/p CVA  HTN, CKD 3  Stable lytes, renal fx  BP acceptable  Avoid nephrotoxins  BMP in am

## 2019-10-07 NOTE — DIETITIAN INITIAL EVALUATION ADULT. - ADD RECOMMEND
1) Monitor Weights, Intake, Tolerance, Skin, POCT & Labwork   2) Glucerna 8oz PO TID 3) Education Provided on Need for Supplementation 4) Continue Nutrition Plan of Care

## 2019-10-07 NOTE — PROGRESS NOTE ADULT - SUBJECTIVE AND OBJECTIVE BOX
Subjective:  No overnight events.     MEDICATIONS  (STANDING):  aspirin  chewable 81 milliGRAM(s) Oral daily  atorvastatin 80 milliGRAM(s) Oral at bedtime  brimonidine 0.2% Ophthalmic Solution 1 Drop(s) Both EYES three times a day  clopidogrel Tablet 75 milliGRAM(s) Oral daily  dextrose 5%. 1000 milliLiter(s) (50 mL/Hr) IV Continuous <Continuous>  dextrose 50% Injectable 12.5 Gram(s) IV Push once  dextrose 50% Injectable 25 Gram(s) IV Push once  dorzolamide 2% Ophthalmic Solution 1 Drop(s) Right EYE <User Schedule>  enoxaparin Injectable 30 milliGRAM(s) SubCutaneous daily  insulin lispro (HumaLOG) corrective regimen sliding scale   SubCutaneous three times a day before meals  insulin lispro Injectable (HumaLOG) 3 Unit(s) SubCutaneous three times a day before meals  latanoprost 0.005% Ophthalmic Solution 1 Drop(s) Right EYE at bedtime    MEDICATIONS  (PRN):  dextrose 40% Gel 15 Gram(s) Oral once PRN Blood Glucose LESS THAN 70 milliGRAM(s)/deciliter  glucagon  Injectable 1 milliGRAM(s) IntraMuscular once PRN Glucose LESS THAN 70 milligrams/deciliter      Allergies    No Known Allergies    Intolerances        Vital Signs Last 24 Hrs  T(C): 36.7 (07 Oct 2019 07:32), Max: 36.7 (06 Oct 2019 21:19)  T(F): 98.1 (07 Oct 2019 07:32), Max: 98.1 (07 Oct 2019 07:32)  HR: 79 (07 Oct 2019 07:32) (75 - 79)  BP: 147/90 (07 Oct 2019 07:32) (146/90 - 147/90)  BP(mean): --  RR: 14 (07 Oct 2019 07:32) (14 - 14)  SpO2: 100% (07 Oct 2019 07:32) (100% - 100%)    PHYSICAL EXAM:  GENERAL: NAD, well-groomed, well-developed  HEAD:  Atraumatic, Normocephalic  ENMT: Moist mucous membranes,   NECK: Supple, No JVD, Normal thyroid  CHEST/LUNG: Clear to auscultation bilaterally; No rales, rhonchi, wheezing, or rubs  HEART: Regular rate and rhythm; No murmurs, rubs, or gallops  ABDOMEN: Soft, Nontender, Nondistended; Bowel sounds present  EXTREMITIES:  2+ Peripheral Pulses, No clubbing, cyanosis, or edema      LABS:              CAPILLARY BLOOD GLUCOSE      POCT Blood Glucose.: 186 mg/dL (07 Oct 2019 07:34)  POCT Blood Glucose.: 157 mg/dL (06 Oct 2019 16:37)      RADIOLOGY & ADDITIONAL TESTS:    Imaging Personally Reviewed:  [ ] YES     Consultant(s) Notes Reviewed:      Care Discussed with Consultants/Other Providers:    Advanced Directives: [ ] DNR  [ ] No feeding tube  [ ] MOLST in chart  [ ] MOLST completed today  [ ] Unknown

## 2019-10-07 NOTE — DIETITIAN INITIAL EVALUATION ADULT. - ENERGY NEEDS
Height: 62Inches   Weight: 123lb   Body Mass Index (BMI): 22.5kg/m2   Ideal Body Weight Range: 110lb (+/-10%)   Percent Ideal Body Weight ~112%

## 2019-10-07 NOTE — DIETITIAN INITIAL EVALUATION ADULT. - OTHER INFO
59yr Old Female - Dx: CVA - Initial Assessment - Consistent Carbohydrate Diet (Recommend Initiate Glucerna 8oz PO TID), Denies Food Allergy/Intolerance, Tolerates Diet Well, No Chewing/Swallowing Complications (Per Patient), Consumes % of Meals (as Per Documentation), No Pressure Ulcers (as Per Nursing Flow Sheets), No Edema Noted (as Per Nursing Flow Sheets), No Recent Vomiting/Constipation (as Per Patient)

## 2019-10-07 NOTE — DIETITIAN INITIAL EVALUATION ADULT. - PHYSICAL APPEARANCE
Temporalis, Orbital Fat Pads, Buccal Fat Pads, Quadricep & Gastrocnemius(Calf) Wasting Observed  (Per Nutrition Focused Physical Exam)

## 2019-10-07 NOTE — DIETITIAN INITIAL EVALUATION ADULT. - PERTINENT LABORATORY DATA
10/5 - Na-139, K-5.0, BUN-58H, Creat-1.67H, Gluc-162H POCT (over Last 2 Days) - Ranging from 157-214

## 2019-10-08 DIAGNOSIS — E11.22 TYPE 2 DIABETES MELLITUS WITH DIABETIC CHRONIC KIDNEY DISEASE: ICD-10-CM

## 2019-10-08 DIAGNOSIS — H54.40 BLINDNESS, ONE EYE, UNSPECIFIED EYE: ICD-10-CM

## 2019-10-08 DIAGNOSIS — I63.9 CEREBRAL INFARCTION, UNSPECIFIED: ICD-10-CM

## 2019-10-08 DIAGNOSIS — I10 ESSENTIAL (PRIMARY) HYPERTENSION: ICD-10-CM

## 2019-10-08 DIAGNOSIS — N17.9 ACUTE KIDNEY FAILURE, UNSPECIFIED: ICD-10-CM

## 2019-10-08 DIAGNOSIS — H40.9 UNSPECIFIED GLAUCOMA: ICD-10-CM

## 2019-10-08 DIAGNOSIS — H91.90 UNSPECIFIED HEARING LOSS, UNSPECIFIED EAR: ICD-10-CM

## 2019-10-08 LAB
ANION GAP SERPL CALC-SCNC: 11 MMOL/L — SIGNIFICANT CHANGE UP (ref 5–17)
BUN SERPL-MCNC: 62 MG/DL — HIGH (ref 7–23)
CALCIUM SERPL-MCNC: 9.7 MG/DL — SIGNIFICANT CHANGE UP (ref 8.4–10.5)
CHLORIDE SERPL-SCNC: 109 MMOL/L — HIGH (ref 96–108)
CO2 SERPL-SCNC: 21 MMOL/L — LOW (ref 22–31)
CREAT SERPL-MCNC: 1.73 MG/DL — HIGH (ref 0.5–1.3)
GLUCOSE BLDC GLUCOMTR-MCNC: 111 MG/DL — HIGH (ref 70–99)
GLUCOSE BLDC GLUCOMTR-MCNC: 165 MG/DL — HIGH (ref 70–99)
GLUCOSE BLDC GLUCOMTR-MCNC: 232 MG/DL — HIGH (ref 70–99)
GLUCOSE SERPL-MCNC: 153 MG/DL — HIGH (ref 70–99)
HCT VFR BLD CALC: 32.9 % — LOW (ref 34.5–45)
HGB BLD-MCNC: 10.2 G/DL — LOW (ref 11.5–15.5)
MCHC RBC-ENTMCNC: 26.4 PG — LOW (ref 27–34)
MCHC RBC-ENTMCNC: 31 GM/DL — LOW (ref 32–36)
MCV RBC AUTO: 85 FL — SIGNIFICANT CHANGE UP (ref 80–100)
NRBC # BLD: 0 /100 WBCS — SIGNIFICANT CHANGE UP (ref 0–0)
PLATELET # BLD AUTO: 260 K/UL — SIGNIFICANT CHANGE UP (ref 150–400)
POTASSIUM SERPL-MCNC: 5.1 MMOL/L — SIGNIFICANT CHANGE UP (ref 3.5–5.3)
POTASSIUM SERPL-SCNC: 5.1 MMOL/L — SIGNIFICANT CHANGE UP (ref 3.5–5.3)
RBC # BLD: 3.87 M/UL — SIGNIFICANT CHANGE UP (ref 3.8–5.2)
RBC # FLD: 13.2 % — SIGNIFICANT CHANGE UP (ref 10.3–14.5)
SODIUM SERPL-SCNC: 141 MMOL/L — SIGNIFICANT CHANGE UP (ref 135–145)
WBC # BLD: 6.34 K/UL — SIGNIFICANT CHANGE UP (ref 3.8–10.5)
WBC # FLD AUTO: 6.34 K/UL — SIGNIFICANT CHANGE UP (ref 3.8–10.5)

## 2019-10-08 PROCEDURE — 99232 SBSQ HOSP IP/OBS MODERATE 35: CPT

## 2019-10-08 PROCEDURE — 99233 SBSQ HOSP IP/OBS HIGH 50: CPT

## 2019-10-08 RX ORDER — POLYETHYLENE GLYCOL 3350 17 G/17G
17 POWDER, FOR SOLUTION ORAL DAILY
Refills: 0 | Status: DISCONTINUED | OUTPATIENT
Start: 2019-10-08 | End: 2019-10-12

## 2019-10-08 RX ORDER — AMLODIPINE BESYLATE 2.5 MG/1
10 TABLET ORAL DAILY
Refills: 0 | Status: DISCONTINUED | OUTPATIENT
Start: 2019-10-08 | End: 2019-10-12

## 2019-10-08 RX ADMIN — CLOPIDOGREL BISULFATE 75 MILLIGRAM(S): 75 TABLET, FILM COATED ORAL at 11:26

## 2019-10-08 RX ADMIN — Medication 81 MILLIGRAM(S): at 11:26

## 2019-10-08 RX ADMIN — INSULIN GLARGINE 15 UNIT(S): 100 INJECTION, SOLUTION SUBCUTANEOUS at 21:07

## 2019-10-08 RX ADMIN — Medication 650 MILLIGRAM(S): at 05:57

## 2019-10-08 RX ADMIN — POLYETHYLENE GLYCOL 3350 17 GRAM(S): 17 POWDER, FOR SOLUTION ORAL at 13:55

## 2019-10-08 RX ADMIN — BRIMONIDINE TARTRATE 1 DROP(S): 2 SOLUTION/ DROPS OPHTHALMIC at 13:56

## 2019-10-08 RX ADMIN — Medication 650 MILLIGRAM(S): at 17:27

## 2019-10-08 RX ADMIN — BRIMONIDINE TARTRATE 1 DROP(S): 2 SOLUTION/ DROPS OPHTHALMIC at 21:04

## 2019-10-08 RX ADMIN — BRIMONIDINE TARTRATE 1 DROP(S): 2 SOLUTION/ DROPS OPHTHALMIC at 05:59

## 2019-10-08 RX ADMIN — DORZOLAMIDE HYDROCHLORIDE TIMOLOL MALEATE 1 DROP(S): 20; 5 SOLUTION/ DROPS OPHTHALMIC at 21:02

## 2019-10-08 RX ADMIN — Medication 2: at 11:33

## 2019-10-08 RX ADMIN — AMLODIPINE BESYLATE 10 MILLIGRAM(S): 2.5 TABLET ORAL at 13:55

## 2019-10-08 RX ADMIN — Medication 1: at 07:50

## 2019-10-08 RX ADMIN — ENOXAPARIN SODIUM 30 MILLIGRAM(S): 100 INJECTION SUBCUTANEOUS at 11:26

## 2019-10-08 RX ADMIN — LATANOPROST 1 DROP(S): 0.05 SOLUTION/ DROPS OPHTHALMIC; TOPICAL at 21:05

## 2019-10-08 RX ADMIN — DORZOLAMIDE HYDROCHLORIDE TIMOLOL MALEATE 1 DROP(S): 20; 5 SOLUTION/ DROPS OPHTHALMIC at 07:53

## 2019-10-08 RX ADMIN — ATORVASTATIN CALCIUM 80 MILLIGRAM(S): 80 TABLET, FILM COATED ORAL at 21:02

## 2019-10-08 NOTE — PROGRESS NOTE ADULT - SUBJECTIVE AND OBJECTIVE BOX
DAILY PROGRESS NOTE:  HPI: 59F RHD pmh stroke? 3 years ago (presented with right hemiparesis/dysarthria), HTN, T2DM (with retinopathy?), glaucoma? with right eye blindness, and hearing loss (has hearing aids) who presented with right hand weakness/numbness and right mouth numbness to [facility] on 9/29/19. LKN was 18:30 on 9/27.  Brain MRI revealed acute infarct at the left thalamus. Head and neck MRA with areas of significant constriction follow by dilation (beads in a string pattern) at the anterior and posterior cerebral arteries noted to be concerning for vasculitis. Rheumatology was consulted for concerns of vasculitis, but her workup was unrevealing. Per Neuro, etiology of stroke likely small vessel disease vs less likely CNS vasculitis as they suspect atherosclerotic changes in the setting of poorly controlled vascular risk factors. (04 Oct 2019 13:12)    Subjective: Patient seen and examined in PT, standing and doing leg raises with ankle weights while holding railing with supervision. Patient had questions regarding her diabetes management, all questions and concerns were addressed. Also reports constipation, last BM was 5 days ago when she had diarrhea.     ROS:  + R eye blind  + Right sided weakness  + R hand numbness  + Constipation  No chest pain, sob, nausea, vomiting, diarrhea, or incontinence    Physical Exam:  Vital Signs Last 24 Hrs  T(C): 36.4 (08 Oct 2019 07:36), Max: 36.8 (07 Oct 2019 20:43)  T(F): 97.5 (08 Oct 2019 07:36), Max: 98.2 (07 Oct 2019 20:43)  HR: 71 (08 Oct 2019 07:36) (71 - 77)  BP: 151/88 (08 Oct 2019 07:36) (151/88 - 159/85)  BP(mean): --  RR: 12 (08 Oct 2019 07:36) (12 - 15)  SpO2: 98% (08 Oct 2019 07:36) (98% - 100%)    Constitutional - NAD, Comfortable  Eyes - Right eye blind  Chest - CTAB  Cardiovascular - RRR, S1S2, no m/r/g  Abdomen - BS+, Soft, NTND  Extremities - No C/C/E, No calf tenderness   Neurologic Exam -                    Cognitive - Awake, Alert, AAO to self, place, date, year, situation     Communication - Fluent, No dysarthria     Motor - No focal deficits  	                  LEFT    UE - ShAB 4/5, EF 4/5, EE 4/5, WE 4/5,  5/5  	                  RIGHT UE - ShAB 4/5, EF 4/5, EE 4/5, WE 4/5,  5/5  	                  LEFT    LE - HF 5/5, KE 5/5, DF 5/5, PF 5/5  	        RIGHT LE - HF 5/5, KE 5/5, DF 5/5, PF 5/5        Sensory - Intact to LT     Reflexes - 2+ b/l patellar, symmetric  Psychiatric - Mood stable, Affect WNL    Recent Labs/Imaging:  Complete Blood Count in AM (10.05.19 @ 06:30)    Nucleated RBC: 0 /100 WBCs    WBC Count: 4.93 K/uL    RBC Count: 4.19 M/uL    Hemoglobin: 11.0 g/dL    Hematocrit: 35.0 %    Mean Cell Volume: 83.5 fl    Mean Cell Hemoglobin: 26.3 pg    Mean Cell Hemoglobin Conc: 31.4 gm/dL    Red Cell Distrib Width: 13.2 %    Platelet Count - Automated: 247 K/uL    10-08    141  |  109<H>  |  62<H>  ----------------------------<  153<H>  5.1   |  21<L>  |  1.73<H>    Ca    9.7      08 Oct 2019 05:45    CAPILLARY BLOOD GLUCOSE  POCT Blood Glucose.: 165 mg/dL (08 Oct 2019 07:47)  POCT Blood Glucose.: 147 mg/dL (07 Oct 2019 16:27)  POCT Blood Glucose.: 191 mg/dL (07 Oct 2019 12:12)    MEDICATIONS  (STANDING):  amLODIPine   Tablet 10 milliGRAM(s) Oral daily  aspirin  chewable 81 milliGRAM(s) Oral daily  atorvastatin 80 milliGRAM(s) Oral at bedtime  brimonidine 0.2% Ophthalmic Solution 1 Drop(s) Both EYES three times a day  clopidogrel Tablet 75 milliGRAM(s) Oral daily  dextrose 5%. 1000 milliLiter(s) (50 mL/Hr) IV Continuous <Continuous>  dextrose 50% Injectable 12.5 Gram(s) IV Push once  dextrose 50% Injectable 25 Gram(s) IV Push once  dorzolamide 2%/timolol 0.5% Ophthalmic Solution 1 Drop(s) Right EYE <User Schedule>  enoxaparin Injectable 30 milliGRAM(s) SubCutaneous daily  insulin glargine Injectable (LANTUS) 15 Unit(s) SubCutaneous at bedtime  insulin lispro (HumaLOG) corrective regimen sliding scale   SubCutaneous three times a day before meals  latanoprost 0.005% Ophthalmic Solution 1 Drop(s) Right EYE at bedtime  polyethylene glycol 3350 17 Gram(s) Oral daily  sodium bicarbonate 650 milliGRAM(s) Oral two times a day    MEDICATIONS  (PRN):  dextrose 40% Gel 15 Gram(s) Oral once PRN Blood Glucose LESS THAN 70 milliGRAM(s)/deciliter  glucagon  Injectable 1 milliGRAM(s) IntraMuscular once PRN Glucose LESS THAN 70 milligrams/deciliter    Assessment/Plan:  DEBORAH NUNEZ is a 59F pmh stroke? 3 years ago (presented with right hemiparesis/dysarthria), HTN, T2DM (with retinopathy?), glaucoma with right eye blindness, and hearing loss (has hearing aids) who presented with right hand weakness/numbness and right mouth numbness to [facility] on 9/29/19 found to have an acute left thalamic infarct, likely secondary to small vessel disease vs CNS vasculitis.    CVA  - left thalamic infarct with right hand and mouth weakness and numbness  - continue Aspirin  - continue Plavix for a total of 3 weeks (until 10/23)  - continue statin    Rehab: Continue PT/OT/SLT    Hypertension: Home med Lisinopril/HCTZ  - Gradual normotension, BP ranging 150's/90's  - Will start amlodipine 10mg daily  - Will hold off on restarting home Lisinopril/HCTZ due to renal function  - Hospitalist consult appreciated    Glaucoma: Continue Latanoprost, Dorzolamide and Brimonidine gtt    BREANNE on CKD Stage III v. IV:   - Creatinine stable, unclear what her baseline is  - Hold Lisinopril/HCTZ  - Hospitalist input appreciated    DM: A1c 9.6%, patient took insulin and janumet at home.   - Endocrine following at [facility]  - FS better controlled, now on basal and bolus insulin  - Continue Lantus 15 units  - Continue Lispro 3U TID before meals  - Continue sliding scale    Constipation: starting Miralax    DVT ppx: Lovenox  Diet: Consistent Carb  Dispo: Pending team meeting, however patient making good progress, may be ready for d/c to home by this weekend.

## 2019-10-08 NOTE — PROGRESS NOTE ADULT - ASSESSMENT
59 female with htn, dm2, glaucoma, ckd stage 4, right eye blindness, hearing loss, s/p acute left thalamic infarct now admitted to [facility] with functional deficits

## 2019-10-08 NOTE — PROGRESS NOTE ADULT - SUBJECTIVE AND OBJECTIVE BOX
Patient is a 59y old  Female who presents with a chief complaint of CVA (07 Oct 2019 13:44)  No acute events overnight          Patient seen and examined at bedside.    ALLERGIES:  No Known Allergies        Vital Signs Last 24 Hrs  T(F): 97.5 (08 Oct 2019 07:36), Max: 98.2 (07 Oct 2019 20:43)  HR: 71 (08 Oct 2019 07:36) (71 - 77)  BP: 151/88 (08 Oct 2019 07:36) (151/88 - 159/85)  RR: 12 (08 Oct 2019 07:36) (12 - 15)  SpO2: 98% (08 Oct 2019 07:36) (98% - 100%)  I&O's Summary    MEDICATIONS:  aspirin  chewable 81 milliGRAM(s) Oral daily  atorvastatin 80 milliGRAM(s) Oral at bedtime  brimonidine 0.2% Ophthalmic Solution 1 Drop(s) Both EYES three times a day  clopidogrel Tablet 75 milliGRAM(s) Oral daily  dextrose 40% Gel 15 Gram(s) Oral once PRN  dextrose 5%. 1000 milliLiter(s) IV Continuous <Continuous>  dextrose 50% Injectable 12.5 Gram(s) IV Push once  dextrose 50% Injectable 25 Gram(s) IV Push once  dorzolamide 2%/timolol 0.5% Ophthalmic Solution 1 Drop(s) Right EYE <User Schedule>  enoxaparin Injectable 30 milliGRAM(s) SubCutaneous daily  glucagon  Injectable 1 milliGRAM(s) IntraMuscular once PRN  insulin glargine Injectable (LANTUS) 15 Unit(s) SubCutaneous at bedtime  insulin lispro (HumaLOG) corrective regimen sliding scale   SubCutaneous three times a day before meals  latanoprost 0.005% Ophthalmic Solution 1 Drop(s) Right EYE at bedtime  sodium bicarbonate 650 milliGRAM(s) Oral two times a day      PHYSICAL EXAM:  General: NAD, comfortable  ENT: MMM  Neck: Supple, No JVD  Lungs: Clear to auscultation bilaterally, good air entry  Cardio: S1S2 regular  Abdomen: Soft, Nontender  Extremities: No cyanosis, No edema    LABS:                        10.2   6.34  )-----------( 260      ( 08 Oct 2019 05:45 )             32.9     10-08    141  |  109  |  62  ----------------------------<  153  5.1   |  21  |  1.73    Ca    9.7      08 Oct 2019 05:45      eGFR if Non African American: 32 mL/min/1.73M2 (10-08-19 @ 05:45)  eGFR if African American: 37 mL/min/1.73M2 (10-08-19 @ 05:45)            09-30 Chol 384 mg/dL  mg/dL HDL 64 mg/dL Trig 251 mg/dL              POCT Blood Glucose.: 165 mg/dL (08 Oct 2019 07:47)  POCT Blood Glucose.: 147 mg/dL (07 Oct 2019 16:27)  POCT Blood Glucose.: 191 mg/dL (07 Oct 2019 12:12)    09-30 [id number] 9.6  09-29 [id number] 9.4        Culture - Urine (collected 04 Oct 2019 13:36)  Source: .Urine  Final Report (07 Oct 2019 23:02):    >=3 organisms. Probable collection contamination.        RADIOLOGY & ADDITIONAL TESTS:    Care Discussed with Consultants/Other Providers:

## 2019-10-08 NOTE — PROGRESS NOTE ADULT - PROBLEM SELECTOR PROBLEM 2
Type 2 diabetes mellitus with stage 4 chronic kidney disease, unspecified whether long term insulin use

## 2019-10-08 NOTE — PROGRESS NOTE ADULT - SUBJECTIVE AND OBJECTIVE BOX
Resting    Vital Signs Last 24 Hrs  T(C): 36.4 (10-08-19 @ 07:36), Max: 36.8 (10-07-19 @ 20:43)  T(F): 97.5 (10-08-19 @ 07:36), Max: 98.2 (10-07-19 @ 20:43)  HR: 74 (10-08-19 @ 13:53) (71 - 77)  BP: 147/71 (10-08-19 @ 13:53) (147/71 - 159/85)  RR: 12 (10-08-19 @ 07:36) (12 - 15)  SpO2: 98% (10-08-19 @ 07:36) (98% - 100%)    Card S1S2  Lungs b/l air entry  Abd soft, NT, ND  Extr no edema                        10.2   6.34  )-----------( 260      ( 08 Oct 2019 05:45 )             32.9     08 Oct 2019 05:45    141    |  109    |  62     ----------------------------<  153    5.1     |  21     |  1.73     Ca    9.7        08 Oct 2019 05:45    amLODIPine   Tablet 10 milliGRAM(s) Oral daily  aspirin  chewable 81 milliGRAM(s) Oral daily  atorvastatin 80 milliGRAM(s) Oral at bedtime  brimonidine 0.2% Ophthalmic Solution 1 Drop(s) Both EYES three times a day  clopidogrel Tablet 75 milliGRAM(s) Oral daily  dextrose 40% Gel 15 Gram(s) Oral once PRN  dextrose 5%. 1000 milliLiter(s) IV Continuous <Continuous>  dextrose 50% Injectable 12.5 Gram(s) IV Push once  dextrose 50% Injectable 25 Gram(s) IV Push once  dorzolamide 2%/timolol 0.5% Ophthalmic Solution 1 Drop(s) Right EYE <User Schedule>  enoxaparin Injectable 30 milliGRAM(s) SubCutaneous daily  glucagon  Injectable 1 milliGRAM(s) IntraMuscular once PRN  insulin glargine Injectable (LANTUS) 15 Unit(s) SubCutaneous at bedtime  insulin lispro (HumaLOG) corrective regimen sliding scale   SubCutaneous three times a day before meals  latanoprost 0.005% Ophthalmic Solution 1 Drop(s) Right EYE at bedtime  polyethylene glycol 3350 17 Gram(s) Oral daily  sodium bicarbonate 650 milliGRAM(s) Oral two times a day    A/P:    S/p CVA  HTN, CKD 3  Stable lytes, renal fx  BP acceptable  Avoid nephrotoxins  Interval renal fx f/u  Rehab

## 2019-10-08 NOTE — PROGRESS NOTE ADULT - ATTENDING COMMENTS
Pt. seen with resident.  Agree with documentation above as per resident with amendments made as appropriate. Patient medically stable. Making progress towards rehab goals.     FS elevated-- d/w hospitalist--  started on Lantus 15 units    HTN--bp controlled off medications-- cont. to monitor--goal -150    Glaucoma-- medications adjusted Pt. seen with resident.  Agree with documentation above as per resident with amendments made as appropriate. Patient medically stable. Making progress towards rehab goals.     DM-2--monitor FS on Lantus 15 units and 3 units premeal humalog    HTN--bp elevated-- started on amlodipine 10mg--cont. to monitor--goal -150

## 2019-10-08 NOTE — PROGRESS NOTE ADULT - PROBLEM SELECTOR PLAN 1
left thalamic stroke, continue comprehensive acute rehab pt/ot/slp  continue asa, plavix, atorvastatin

## 2019-10-08 NOTE — PROGRESS NOTE ADULT - PROBLEM SELECTOR PLAN 2
last a1c 9.6, continue lantus 15 units at bedtime, and correction insulin, continue to monitor accuchecks

## 2019-10-08 NOTE — PROGRESS NOTE ADULT - PROBLEM SELECTOR PLAN 3
smooth on ckd stage 4, nephro following, cr appears at baseline, avoid nephrotoxins, monitor bmp and lytes

## 2019-10-09 LAB
GLUCOSE BLDC GLUCOMTR-MCNC: 132 MG/DL — HIGH (ref 70–99)
GLUCOSE BLDC GLUCOMTR-MCNC: 140 MG/DL — HIGH (ref 70–99)
GLUCOSE BLDC GLUCOMTR-MCNC: 204 MG/DL — HIGH (ref 70–99)

## 2019-10-09 PROCEDURE — 99233 SBSQ HOSP IP/OBS HIGH 50: CPT

## 2019-10-09 PROCEDURE — 99232 SBSQ HOSP IP/OBS MODERATE 35: CPT

## 2019-10-09 RX ADMIN — ENOXAPARIN SODIUM 30 MILLIGRAM(S): 100 INJECTION SUBCUTANEOUS at 11:10

## 2019-10-09 RX ADMIN — INSULIN GLARGINE 15 UNIT(S): 100 INJECTION, SOLUTION SUBCUTANEOUS at 21:01

## 2019-10-09 RX ADMIN — BRIMONIDINE TARTRATE 1 DROP(S): 2 SOLUTION/ DROPS OPHTHALMIC at 06:23

## 2019-10-09 RX ADMIN — POLYETHYLENE GLYCOL 3350 17 GRAM(S): 17 POWDER, FOR SOLUTION ORAL at 11:10

## 2019-10-09 RX ADMIN — Medication 81 MILLIGRAM(S): at 11:10

## 2019-10-09 RX ADMIN — Medication 2: at 11:44

## 2019-10-09 RX ADMIN — DORZOLAMIDE HYDROCHLORIDE TIMOLOL MALEATE 1 DROP(S): 20; 5 SOLUTION/ DROPS OPHTHALMIC at 07:24

## 2019-10-09 RX ADMIN — AMLODIPINE BESYLATE 10 MILLIGRAM(S): 2.5 TABLET ORAL at 06:23

## 2019-10-09 RX ADMIN — LATANOPROST 1 DROP(S): 0.05 SOLUTION/ DROPS OPHTHALMIC; TOPICAL at 21:02

## 2019-10-09 RX ADMIN — CLOPIDOGREL BISULFATE 75 MILLIGRAM(S): 75 TABLET, FILM COATED ORAL at 11:10

## 2019-10-09 RX ADMIN — Medication 650 MILLIGRAM(S): at 16:54

## 2019-10-09 RX ADMIN — BRIMONIDINE TARTRATE 1 DROP(S): 2 SOLUTION/ DROPS OPHTHALMIC at 21:01

## 2019-10-09 RX ADMIN — BRIMONIDINE TARTRATE 1 DROP(S): 2 SOLUTION/ DROPS OPHTHALMIC at 13:14

## 2019-10-09 RX ADMIN — Medication 650 MILLIGRAM(S): at 06:24

## 2019-10-09 RX ADMIN — DORZOLAMIDE HYDROCHLORIDE TIMOLOL MALEATE 1 DROP(S): 20; 5 SOLUTION/ DROPS OPHTHALMIC at 20:59

## 2019-10-09 RX ADMIN — ATORVASTATIN CALCIUM 80 MILLIGRAM(S): 80 TABLET, FILM COATED ORAL at 21:01

## 2019-10-09 NOTE — PROGRESS NOTE ADULT - ASSESSMENT
DEBORAH NUNEZ is a 59F pmh stroke? 3 years ago (presented with right hemiparesis/dysarthria), HTN, T2DM (with retinopathy?), glaucoma with right eye blindness, and hearing loss (has hearing aids) who presented with right hand weakness/numbness and right mouth numbness to [facility] on 9/29/19 found to have an acute left thalamic infarct, likely secondary to small vessel disease vs CNS vasculitis.    CVA  - left thalamic infarct with right hand and mouth weakness and numbness  - continue Aspirin  - continue Plavix for a total of 3 weeks (until 10/23)  - continue statin    Rehab: Continue PT/OT/SLT    Hypertension: Home med Lisinopril/HCTZ  -  BP improved today  -  cont. on amlodipine 10mg daily--started 10/8  - Will hold off on restarting home Lisinopril/HCTZ due to renal function  - Hospitalist consult appreciated    Glaucoma: Continue Latanoprost, Dorzolamide and Brimonidine gtt    BREANNE on CKD Stage III v. IV:   - Creatinine stable, unclear what her baseline is  - Hold Lisinopril/HCTZ  - Hospitalist input appreciated    DM: A1c 9.6%, patient took insulin and janumet at home.   - Endocrine following at [facility]  - FS stable  - Continue Lantus 15 units  - Continue sliding scale    Constipation: Miralax    DVT ppx: Lovenox  Diet: Consistent Carb  Dispo: Pending team meeting, however patient making good progress, may be ready for d/c to home by this weekend.    Peer to Peer review done today with dr. Camacho.  Needs evaluation from OT and speech and updated therapy notes to make determination.  Contacted  to submit

## 2019-10-09 NOTE — PROGRESS NOTE ADULT - SUBJECTIVE AND OBJECTIVE BOX
HPI:  Pt is a 58 y/o RH F with PMHx of stroke? 3 years ago (presented with right hemiparesis/dysarthria), HTN, T2DM (with retinopathy?), glaucoma? with right eye blindness, and hearing loss (has hearing aids) who presented with right hand weakness/numbness and right mouth numbness to [facility] on 9/29/19. LKN was 18:30 on 9/27.  Brain MRI revealed acute infarct at the left thalamus. Head and neck MRA with areas of significant constriction follow by dilation (beads in a string pattern) at the anterior and posterior cerebral arteries noted to be concerning for vasculitis. Rheumatology was consulted for concerns of vasculitis, but her workup was unrevealing. Per Neuro, etiology of stroke likely small vessel disease vs less likely CNS vasculitis as they suspect atherosclerotic changes in the setting of poorly controlled vascular risk factors. (04 Oct 2019 13:12)      PAST MEDICAL & SURGICAL HISTORY:  CVA (cerebral vascular accident)  Hypertension  Diabetes mellitus  Diabetes  No significant past surgical history      Subjective:  No new complaints.  Making progress in therapy.  Son present for family training.     REVIEW OF SYMPTOMS  + R eye blind  + Right sided weakness  + R hand numbness  + Constipation  No chest pain, sob, nausea, vomiting, diarrhea, or incontinence      VITALS  Vital Signs Last 24 Hrs  T(C): 36.1 (09 Oct 2019 07:15), Max: 36.7 (08 Oct 2019 21:10)  T(F): 97 (09 Oct 2019 07:15), Max: 98.1 (08 Oct 2019 21:10)  HR: 72 (09 Oct 2019 07:15) (72 - 80)  BP: 115/74 (09 Oct 2019 07:15) (115/74 - 148/93)  BP(mean): --  RR: 15 (09 Oct 2019 07:15) (14 - 15)  SpO2: 98% (09 Oct 2019 07:15) (98% - 100%)      PHYSICAL EXAM  Constitutional - NAD, Comfortable  Eyes - Right eye blind  Chest - CTAB  Cardiovascular - RRR, S1S2, no m/r/g  Abdomen - BS+, Soft, NTND  Extremities - No C/C/E, No calf tenderness   Neurologic Exam -                    Cognitive - Awake, Alert, AAO to self, place, date, year, situation     Communication - Fluent, No dysarthria     Motor - No focal deficits  	                  LEFT    UE - ShAB 4/5, EF 4/5, EE 4/5, WE 4/5,  5/5  	                  RIGHT UE - ShAB 4/5, EF 4/5, EE 4/5, WE 4/5,  5/5  	                  LEFT    LE - HF 5/5, KE 5/5, DF 5/5, PF 5/5  	        RIGHT LE - HF 5/5, KE 5/5, DF 5/5, PF 5/5        Sensory - Intact to LT     Reflexes - 2+ b/l patellar, symmetric  Psychiatric - Mood stable, Affect WNL    RECENT LABS                        10.2   6.34  )-----------( 260      ( 08 Oct 2019 05:45 )             32.9     10-08    141  |  109<H>  |  62<H>  ----------------------------<  153<H>  5.1   |  21<L>  |  1.73<H>    Ca    9.7      08 Oct 2019 05:45              RADIOLOGY/OTHER RESULTS      MEDICATIONS  (STANDING):  amLODIPine   Tablet 10 milliGRAM(s) Oral daily  aspirin  chewable 81 milliGRAM(s) Oral daily  atorvastatin 80 milliGRAM(s) Oral at bedtime  brimonidine 0.2% Ophthalmic Solution 1 Drop(s) Both EYES three times a day  clopidogrel Tablet 75 milliGRAM(s) Oral daily  dextrose 5%. 1000 milliLiter(s) (50 mL/Hr) IV Continuous <Continuous>  dextrose 50% Injectable 12.5 Gram(s) IV Push once  dextrose 50% Injectable 25 Gram(s) IV Push once  dorzolamide 2%/timolol 0.5% Ophthalmic Solution 1 Drop(s) Right EYE <User Schedule>  enoxaparin Injectable 30 milliGRAM(s) SubCutaneous daily  insulin glargine Injectable (LANTUS) 15 Unit(s) SubCutaneous at bedtime  insulin lispro (HumaLOG) corrective regimen sliding scale   SubCutaneous three times a day before meals  latanoprost 0.005% Ophthalmic Solution 1 Drop(s) Right EYE at bedtime  polyethylene glycol 3350 17 Gram(s) Oral daily  sodium bicarbonate 650 milliGRAM(s) Oral two times a day    MEDICATIONS  (PRN):  dextrose 40% Gel 15 Gram(s) Oral once PRN Blood Glucose LESS THAN 70 milliGRAM(s)/deciliter  glucagon  Injectable 1 milliGRAM(s) IntraMuscular once PRN Glucose LESS THAN 70 milligrams/deciliter

## 2019-10-09 NOTE — PROGRESS NOTE ADULT - SUBJECTIVE AND OBJECTIVE BOX
Patient is a 59y old  Female who presents with a chief complaint of CVA (08 Oct 2019 18:48)  No acute events          Patient seen and examined at bedside.    ALLERGIES:  No Known Allergies        Vital Signs Last 24 Hrs  T(F): 97 (09 Oct 2019 07:15), Max: 98.1 (08 Oct 2019 21:10)  HR: 72 (09 Oct 2019 07:15) (72 - 80)  BP: 115/74 (09 Oct 2019 07:15) (115/74 - 148/93)  RR: 15 (09 Oct 2019 07:15) (14 - 15)  SpO2: 98% (09 Oct 2019 07:15) (98% - 100%)  I&O's Summary    08 Oct 2019 07:01  -  09 Oct 2019 07:00  --------------------------------------------------------  IN: 0 mL / OUT: 1 mL / NET: -1 mL      MEDICATIONS:  amLODIPine   Tablet 10 milliGRAM(s) Oral daily  aspirin  chewable 81 milliGRAM(s) Oral daily  atorvastatin 80 milliGRAM(s) Oral at bedtime  brimonidine 0.2% Ophthalmic Solution 1 Drop(s) Both EYES three times a day  clopidogrel Tablet 75 milliGRAM(s) Oral daily  dextrose 40% Gel 15 Gram(s) Oral once PRN  dextrose 5%. 1000 milliLiter(s) IV Continuous <Continuous>  dextrose 50% Injectable 12.5 Gram(s) IV Push once  dextrose 50% Injectable 25 Gram(s) IV Push once  dorzolamide 2%/timolol 0.5% Ophthalmic Solution 1 Drop(s) Right EYE <User Schedule>  enoxaparin Injectable 30 milliGRAM(s) SubCutaneous daily  glucagon  Injectable 1 milliGRAM(s) IntraMuscular once PRN  insulin glargine Injectable (LANTUS) 15 Unit(s) SubCutaneous at bedtime  insulin lispro (HumaLOG) corrective regimen sliding scale   SubCutaneous three times a day before meals  latanoprost 0.005% Ophthalmic Solution 1 Drop(s) Right EYE at bedtime  polyethylene glycol 3350 17 Gram(s) Oral daily  sodium bicarbonate 650 milliGRAM(s) Oral two times a day      PHYSICAL EXAM:  General: NAD  ENT: MMM  Neck: Supple, No JVD  Lungs: Clear to auscultation bilaterally, good air entry  Cardio: RRR, S1/S2, No murmurs  Abdomen: Soft, Nontender, Nondistended; Bowel sounds present  Extremities: No cyanosis, No edema    LABS:                        10.2   6.34  )-----------( 260      ( 08 Oct 2019 05:45 )             32.9     10-08    141  |  109  |  62  ----------------------------<  153  5.1   |  21  |  1.73    Ca    9.7      08 Oct 2019 05:45      eGFR if Non African American: 32 mL/min/1.73M2 (10-08-19 @ 05:45)  eGFR if African American: 37 mL/min/1.73M2 (10-08-19 @ 05:45)            09-30 Chol 384 mg/dL  mg/dL HDL 64 mg/dL Trig 251 mg/dL              POCT Blood Glucose.: 140 mg/dL (09 Oct 2019 07:27)  POCT Blood Glucose.: 111 mg/dL (08 Oct 2019 17:01)  POCT Blood Glucose.: 232 mg/dL (08 Oct 2019 11:31)    09-30 [id number] 9.6  09-29 [id number] 9.4        Culture - Urine (collected 04 Oct 2019 13:36)  Source: .Urine  Final Report (07 Oct 2019 23:02):    >=3 organisms. Probable collection contamination.        RADIOLOGY & ADDITIONAL TESTS:    Care Discussed with Consultants/Other Providers:

## 2019-10-10 LAB
GLUCOSE BLDC GLUCOMTR-MCNC: 124 MG/DL — HIGH (ref 70–99)
GLUCOSE BLDC GLUCOMTR-MCNC: 193 MG/DL — HIGH (ref 70–99)
GLUCOSE BLDC GLUCOMTR-MCNC: 83 MG/DL — SIGNIFICANT CHANGE UP (ref 70–99)

## 2019-10-10 PROCEDURE — 99232 SBSQ HOSP IP/OBS MODERATE 35: CPT

## 2019-10-10 RX ADMIN — BRIMONIDINE TARTRATE 1 DROP(S): 2 SOLUTION/ DROPS OPHTHALMIC at 06:29

## 2019-10-10 RX ADMIN — BRIMONIDINE TARTRATE 1 DROP(S): 2 SOLUTION/ DROPS OPHTHALMIC at 16:51

## 2019-10-10 RX ADMIN — INSULIN GLARGINE 15 UNIT(S): 100 INJECTION, SOLUTION SUBCUTANEOUS at 21:41

## 2019-10-10 RX ADMIN — Medication 1: at 16:52

## 2019-10-10 RX ADMIN — Medication 650 MILLIGRAM(S): at 17:48

## 2019-10-10 RX ADMIN — AMLODIPINE BESYLATE 10 MILLIGRAM(S): 2.5 TABLET ORAL at 06:29

## 2019-10-10 RX ADMIN — ATORVASTATIN CALCIUM 80 MILLIGRAM(S): 80 TABLET, FILM COATED ORAL at 21:31

## 2019-10-10 RX ADMIN — Medication 81 MILLIGRAM(S): at 11:21

## 2019-10-10 RX ADMIN — DORZOLAMIDE HYDROCHLORIDE TIMOLOL MALEATE 1 DROP(S): 20; 5 SOLUTION/ DROPS OPHTHALMIC at 08:35

## 2019-10-10 RX ADMIN — BRIMONIDINE TARTRATE 1 DROP(S): 2 SOLUTION/ DROPS OPHTHALMIC at 21:34

## 2019-10-10 RX ADMIN — CLOPIDOGREL BISULFATE 75 MILLIGRAM(S): 75 TABLET, FILM COATED ORAL at 11:21

## 2019-10-10 RX ADMIN — LATANOPROST 1 DROP(S): 0.05 SOLUTION/ DROPS OPHTHALMIC; TOPICAL at 21:32

## 2019-10-10 RX ADMIN — POLYETHYLENE GLYCOL 3350 17 GRAM(S): 17 POWDER, FOR SOLUTION ORAL at 11:20

## 2019-10-10 RX ADMIN — DORZOLAMIDE HYDROCHLORIDE TIMOLOL MALEATE 1 DROP(S): 20; 5 SOLUTION/ DROPS OPHTHALMIC at 21:38

## 2019-10-10 RX ADMIN — Medication 650 MILLIGRAM(S): at 06:29

## 2019-10-10 RX ADMIN — ENOXAPARIN SODIUM 30 MILLIGRAM(S): 100 INJECTION SUBCUTANEOUS at 11:20

## 2019-10-10 NOTE — PROGRESS NOTE ADULT - SUBJECTIVE AND OBJECTIVE BOX
DAILY PROGRESS NOTE:  HPI: 59F RHD pmh stroke? 3 years ago (presented with right hemiparesis/dysarthria), HTN, T2DM (with retinopathy?), glaucoma? with right eye blindness, and hearing loss (has hearing aids) who presented with right hand weakness/numbness and right mouth numbness to [facility] on 9/29/19. LKN was 18:30 on 9/27.  Brain MRI revealed acute infarct at the left thalamus. Head and neck MRA with areas of significant constriction follow by dilation (beads in a string pattern) at the anterior and posterior cerebral arteries noted to be concerning for vasculitis. Rheumatology was consulted for concerns of vasculitis, but her workup was unrevealing. Per Neuro, etiology of stroke likely small vessel disease vs less likely CNS vasculitis as they suspect atherosclerotic changes in the setting of poorly controlled vascular risk factors. (04 Oct 2019 13:12)    Subjective: Patient seen and examined in PT, reports mild leg swelling. No other complaints.     ROS:  + R eye blind  + Right sided weakness  + R hand numbness  + Constipation  No chest pain, sob, nausea, vomiting, diarrhea, or incontinence    Physical Exam:  Vital Signs Last 24 Hrs  T(C): 36.7 (10 Oct 2019 08:57), Max: 36.7 (09 Oct 2019 21:07)  T(F): 98 (10 Oct 2019 08:57), Max: 98.1 (09 Oct 2019 21:07)  HR: 78 (10 Oct 2019 08:57) (73 - 78)  BP: 120/82 (10 Oct 2019 08:57) (120/82 - 156/78)  BP(mean): --  RR: 17 (10 Oct 2019 08:57) (15 - 17)  SpO2: 100% (10 Oct 2019 08:57) (100% - 100%)    Constitutional - NAD, Comfortable  Eyes - Right eye blind  Chest - CTAB  Cardiovascular - RRR, S1S2, no m/r/g  Abdomen - BS+, Soft, NTND  Extremities - trace bilateral foot and ankle edema, non-pitting; No calf tenderness   Neurologic Exam -                    Cognitive - Awake, Alert, AAO to self, place, date, year, situation     Communication - Fluent, No dysarthria     Motor - No focal deficits  	                  LEFT    UE - ShAB 4/5, EF 4/5, EE 4/5, WE 4/5,  5/5  	                  RIGHT UE - ShAB 4/5, EF 4/5, EE 4/5, WE 4/5,  5/5  	                  LEFT    LE - HF 5/5, KE 5/5, DF 5/5, PF 5/5  	        RIGHT LE - HF 5/5, KE 5/5, DF 5/5, PF 5/5        Sensory - Intact to LT     Reflexes - 2+ b/l patellar, symmetric  Psychiatric - Mood stable, Affect WNL    Recent Labs/Imaging:  CAPILLARY BLOOD GLUCOSE  POCT Blood Glucose.: 124 mg/dL (10 Oct 2019 11:20)  POCT Blood Glucose.: 83 mg/dL (10 Oct 2019 08:32)  POCT Blood Glucose.: 132 mg/dL (09 Oct 2019 16:51)    MEDICATIONS  (STANDING):  amLODIPine   Tablet 10 milliGRAM(s) Oral daily  aspirin  chewable 81 milliGRAM(s) Oral daily  atorvastatin 80 milliGRAM(s) Oral at bedtime  brimonidine 0.2% Ophthalmic Solution 1 Drop(s) Both EYES three times a day  clopidogrel Tablet 75 milliGRAM(s) Oral daily  dextrose 5%. 1000 milliLiter(s) (50 mL/Hr) IV Continuous <Continuous>  dextrose 50% Injectable 12.5 Gram(s) IV Push once  dextrose 50% Injectable 25 Gram(s) IV Push once  dorzolamide 2%/timolol 0.5% Ophthalmic Solution 1 Drop(s) Right EYE <User Schedule>  enoxaparin Injectable 30 milliGRAM(s) SubCutaneous daily  insulin glargine Injectable (LANTUS) 15 Unit(s) SubCutaneous at bedtime  insulin lispro (HumaLOG) corrective regimen sliding scale   SubCutaneous three times a day before meals  latanoprost 0.005% Ophthalmic Solution 1 Drop(s) Right EYE at bedtime  polyethylene glycol 3350 17 Gram(s) Oral daily  sodium bicarbonate 650 milliGRAM(s) Oral two times a day    MEDICATIONS  (PRN):  dextrose 40% Gel 15 Gram(s) Oral once PRN Blood Glucose LESS THAN 70 milliGRAM(s)/deciliter  glucagon  Injectable 1 milliGRAM(s) IntraMuscular once PRN Glucose LESS THAN 70 milligrams/deciliter    Assessment/Plan:  DEBORAH NUNEZ is a 59F pmh stroke? 3 years ago (presented with right hemiparesis/dysarthria), HTN, T2DM (with retinopathy?), glaucoma with right eye blindness, and hearing loss (has hearing aids) who presented with right hand weakness/numbness and right mouth numbness to [facility] on 9/29/19 found to have an acute left thalamic infarct, likely secondary to small vessel disease vs CNS vasculitis.    CVA  - left thalamic infarct with right hand and mouth weakness and numbness  - continue Aspirin  - continue Plavix for a total of 3 weeks (until 10/23)  - continue statin    Rehab: Continue PT/OT/SLT    Hypertension: Home med Lisinopril/HCTZ  - BP improved with amlodipine  - Continue amlodipine 10mg daily, started on 10/8  - Will hold off on restarting home Lisinopril/HCTZ due to renal function  - Hospitalist consult appreciated    Glaucoma: Continue Latanoprost, Dorzolamide and Brimonidine gtt    BREANNE on CKD Stage III v. IV:   - Creatinine stable, unclear what her baseline is  - Hold Lisinopril/HCTZ  - Hospitalist input appreciated    DM: A1c 9.6%, patient took insulin and janumet at home.   - Endocrine following at [facility]  - FS better controlled  - Continue Lantus 15 units  - Continue sliding scale    Constipation: starting Miralax    Dependent edema: Encouraged pt to elevate legs when in bed.  - SAM stockings    DVT ppx: Lovenox  Diet: Consistent Carb  Dispo: IDT meeting 10/10:  - OT: Set up/supervision, Family training completed  - PT: Supervision, Family training completed  - SLT: Mild problem solving and memory difficulties, Min A for Cognition.  - Anticipate patient to meet functional goal of Mod I with ADL's and ambulation by 10/12

## 2019-10-10 NOTE — PROGRESS NOTE ADULT - SUBJECTIVE AND OBJECTIVE BOX
No complaints    Vital Signs Last 24 Hrs  T(C): 36.7 (10-10-19 @ 08:57), Max: 36.7 (10-09-19 @ 21:07)  T(F): 98 (10-10-19 @ 08:57), Max: 98.1 (10-09-19 @ 21:07)  HR: 78 (10-10-19 @ 08:57) (73 - 78)  BP: 120/82 (10-10-19 @ 08:57) (120/82 - 156/78)  RR: 17 (10-10-19 @ 08:57) (15 - 17)  SpO2: 100% (10-10-19 @ 08:57) (100% - 100%)    Card S1S2  Lungs b/l air entry  Abd soft, NT, ND  Extr no edema             amLODIPine   Tablet 10 milliGRAM(s) Oral daily  aspirin  chewable 81 milliGRAM(s) Oral daily  atorvastatin 80 milliGRAM(s) Oral at bedtime  brimonidine 0.2% Ophthalmic Solution 1 Drop(s) Both EYES three times a day  clopidogrel Tablet 75 milliGRAM(s) Oral daily  dextrose 40% Gel 15 Gram(s) Oral once PRN  dextrose 5%. 1000 milliLiter(s) IV Continuous <Continuous>  dextrose 50% Injectable 12.5 Gram(s) IV Push once  dextrose 50% Injectable 25 Gram(s) IV Push once  dorzolamide 2%/timolol 0.5% Ophthalmic Solution 1 Drop(s) Right EYE <User Schedule>  enoxaparin Injectable 30 milliGRAM(s) SubCutaneous daily  glucagon  Injectable 1 milliGRAM(s) IntraMuscular once PRN  insulin glargine Injectable (LANTUS) 15 Unit(s) SubCutaneous at bedtime  insulin lispro (HumaLOG) corrective regimen sliding scale   SubCutaneous three times a day before meals  latanoprost 0.005% Ophthalmic Solution 1 Drop(s) Right EYE at bedtime  polyethylene glycol 3350 17 Gram(s) Oral daily  sodium bicarbonate 650 milliGRAM(s) Oral two times a day    A/P:    S/p CVA  HTN, CKD 3  Stable lytes, renal fx  BP acceptable  Avoid nephrotoxins  No NSAID's, d/w pt  Interval renal fx f/u  Rehab

## 2019-10-10 NOTE — PROGRESS NOTE ADULT - ATTENDING COMMENTS
Pt. seen with resident.  Agree with documentation above as per resident. Patient medically stable. Making progress towards rehab goals.

## 2019-10-10 NOTE — CHART NOTE - NSCHARTNOTEFT_GEN_A_CORE
Nutrition Follow Up Note  Hospital Course   (Per Electronic Medical Record):     Source:   Patient [X]  Medical Record [X]      Diet:   Consistent Carbohydrate DASH-TLC Renal Diet w/ Thin Liquids  Tolerates Diet Well  No Chewing/Swallowing Difficulties  No Recent Nausea, Vomiting & Diarrhea (Some Constipation)  Consumes % of Meals (as Per Documentation)    States Good PO Intake/Appetite   on Glucerna 8oz PO TID (Provides 660kcal-30grams of Protein)  Patient Takes Nutrition Supplement  Education Provided on Need for Increased Fluids/Fiber & Blood Glucose Management     Enteral/Parenteral Nutrition: N/A    Current Weight: 123.2lb on 10/4  Obtain New Weight  Obtain Weights Weekly     Pertinent Medications: MEDICATIONS  (STANDING):  amLODIPine   Tablet 10 milliGRAM(s) Oral daily  aspirin  chewable 81 milliGRAM(s) Oral daily  atorvastatin 80 milliGRAM(s) Oral at bedtime  brimonidine 0.2% Ophthalmic Solution 1 Drop(s) Both EYES three times a day  clopidogrel Tablet 75 milliGRAM(s) Oral daily  dextrose 5%. 1000 milliLiter(s) (50 mL/Hr) IV Continuous <Continuous>  dextrose 50% Injectable 12.5 Gram(s) IV Push once  dextrose 50% Injectable 25 Gram(s) IV Push once  dorzolamide 2%/timolol 0.5% Ophthalmic Solution 1 Drop(s) Right EYE <User Schedule>  enoxaparin Injectable 30 milliGRAM(s) SubCutaneous daily  insulin glargine Injectable (LANTUS) 15 Unit(s) SubCutaneous at bedtime  insulin lispro (HumaLOG) corrective regimen sliding scale   SubCutaneous three times a day before meals  latanoprost 0.005% Ophthalmic Solution 1 Drop(s) Right EYE at bedtime  polyethylene glycol 3350 17 Gram(s) Oral daily  sodium bicarbonate 650 milliGRAM(s) Oral two times a day    MEDICATIONS  (PRN):  dextrose 40% Gel 15 Gram(s) Oral once PRN Blood Glucose LESS THAN 70 milliGRAM(s)/deciliter  glucagon  Injectable 1 milliGRAM(s) IntraMuscular once PRN Glucose LESS THAN 70 milligrams/deciliter    Pertinent Labs:  10-08 Na141 mmol/L Glu 153 mg/dL<H> K+ 5.1 mmol/L Cr  1.73 mg/dL<H> BUN 62 mg/dL<H> 09-30 [id number] 9.6 %<H> 09-30 Chol 384 mg/dL<H>  mg/dL<H> HDL 64 mg/dL Trig 251 mg/dL<H>    POCT (over Last 3 Days) - Ranging from   Education Provided on Blood Glucose Management     Skin: No Pressure Ulcers     Edema: None Noted     Last Bowel Movement: on 10/9    Estimated Needs:   [X] No Change Since Previous Assessment    Previous Nutrition Diagnosis:   Severe Malnutrition     Nutrition Diagnosis is [X] Ongoing - Continues on Nutrition Supplement & Patient Takes Nutrition Supplement     New Nutrition Diagnosis: [X] Not Applicable    Interventions:   1. Education Provided on Need for Increased Fluids/Fiber & Blood Glucose Management   2. Recommend Continue Nutrition Plan of Care     Monitoring & Evaluation:   [X] Weights   [X] PO Intake   [X] Follow Up (Per Protocol)  [X] Tolerance to Diet Prescription   [X] Other: Labs & POCT    Registered Dietitian/Nutritionist Remains Available.  Marko Klein RDN    Pager # [phone]  Phone# [phone]

## 2019-10-11 LAB
GLUCOSE BLDC GLUCOMTR-MCNC: 129 MG/DL — HIGH (ref 70–99)
GLUCOSE BLDC GLUCOMTR-MCNC: 154 MG/DL — HIGH (ref 70–99)
GLUCOSE BLDC GLUCOMTR-MCNC: 155 MG/DL — HIGH (ref 70–99)
GLUCOSE BLDC GLUCOMTR-MCNC: 77 MG/DL — SIGNIFICANT CHANGE UP (ref 70–99)

## 2019-10-11 PROCEDURE — 99232 SBSQ HOSP IP/OBS MODERATE 35: CPT

## 2019-10-11 RX ORDER — DORZOLAMIDE HYDROCHLORIDE TIMOLOL MALEATE 20; 5 MG/ML; MG/ML
1 SOLUTION/ DROPS OPHTHALMIC
Qty: 0 | Refills: 0 | DISCHARGE
Start: 2019-10-11

## 2019-10-11 RX ORDER — INSULIN LISPRO 100/ML
3 VIAL (ML) SUBCUTANEOUS
Qty: 0 | Refills: 0 | DISCHARGE

## 2019-10-11 RX ORDER — SENNA PLUS 8.6 MG/1
2 TABLET ORAL AT BEDTIME
Refills: 0 | Status: DISCONTINUED | OUTPATIENT
Start: 2019-10-11 | End: 2019-10-12

## 2019-10-11 RX ORDER — ASPIRIN/CALCIUM CARB/MAGNESIUM 324 MG
1 TABLET ORAL
Qty: 30 | Refills: 0
Start: 2019-10-11 | End: 2019-11-09

## 2019-10-11 RX ORDER — ISOPROPYL ALCOHOL, BENZOCAINE .7; .06 ML/ML; ML/ML
1 SWAB TOPICAL
Qty: 100 | Refills: 1
Start: 2019-10-11 | End: 2019-11-29

## 2019-10-11 RX ORDER — BRIMONIDINE TARTRATE 2 MG/MG
1 SOLUTION/ DROPS OPHTHALMIC
Qty: 0 | Refills: 0 | DISCHARGE
Start: 2019-10-11

## 2019-10-11 RX ORDER — CLOPIDOGREL BISULFATE 75 MG/1
1 TABLET, FILM COATED ORAL
Qty: 11 | Refills: 0
Start: 2019-10-11 | End: 2019-10-21

## 2019-10-11 RX ORDER — INSULIN LISPRO 100/ML
0 VIAL (ML) SUBCUTANEOUS
Qty: 0 | Refills: 0 | DISCHARGE

## 2019-10-11 RX ORDER — ENOXAPARIN SODIUM 100 MG/ML
15 INJECTION SUBCUTANEOUS
Qty: 4.5 | Refills: 0
Start: 2019-10-11 | End: 2019-11-09

## 2019-10-11 RX ORDER — ATORVASTATIN CALCIUM 80 MG/1
1 TABLET, FILM COATED ORAL
Qty: 30 | Refills: 0
Start: 2019-10-11 | End: 2019-11-09

## 2019-10-11 RX ORDER — AMLODIPINE BESYLATE 2.5 MG/1
1 TABLET ORAL
Qty: 30 | Refills: 0
Start: 2019-10-11 | End: 2019-11-09

## 2019-10-11 RX ORDER — SODIUM BICARBONATE 1 MEQ/ML
1 SYRINGE (ML) INTRAVENOUS
Qty: 60 | Refills: 0
Start: 2019-10-11 | End: 2019-11-09

## 2019-10-11 RX ADMIN — LATANOPROST 1 DROP(S): 0.05 SOLUTION/ DROPS OPHTHALMIC; TOPICAL at 21:03

## 2019-10-11 RX ADMIN — POLYETHYLENE GLYCOL 3350 17 GRAM(S): 17 POWDER, FOR SOLUTION ORAL at 11:40

## 2019-10-11 RX ADMIN — ENOXAPARIN SODIUM 30 MILLIGRAM(S): 100 INJECTION SUBCUTANEOUS at 11:40

## 2019-10-11 RX ADMIN — INSULIN GLARGINE 15 UNIT(S): 100 INJECTION, SOLUTION SUBCUTANEOUS at 21:01

## 2019-10-11 RX ADMIN — DORZOLAMIDE HYDROCHLORIDE TIMOLOL MALEATE 1 DROP(S): 20; 5 SOLUTION/ DROPS OPHTHALMIC at 20:20

## 2019-10-11 RX ADMIN — AMLODIPINE BESYLATE 10 MILLIGRAM(S): 2.5 TABLET ORAL at 05:37

## 2019-10-11 RX ADMIN — BRIMONIDINE TARTRATE 1 DROP(S): 2 SOLUTION/ DROPS OPHTHALMIC at 21:02

## 2019-10-11 RX ADMIN — SENNA PLUS 2 TABLET(S): 8.6 TABLET ORAL at 21:01

## 2019-10-11 RX ADMIN — Medication 650 MILLIGRAM(S): at 05:37

## 2019-10-11 RX ADMIN — ATORVASTATIN CALCIUM 80 MILLIGRAM(S): 80 TABLET, FILM COATED ORAL at 21:01

## 2019-10-11 RX ADMIN — Medication 81 MILLIGRAM(S): at 11:40

## 2019-10-11 RX ADMIN — BRIMONIDINE TARTRATE 1 DROP(S): 2 SOLUTION/ DROPS OPHTHALMIC at 05:38

## 2019-10-11 RX ADMIN — Medication 650 MILLIGRAM(S): at 17:37

## 2019-10-11 RX ADMIN — CLOPIDOGREL BISULFATE 75 MILLIGRAM(S): 75 TABLET, FILM COATED ORAL at 11:40

## 2019-10-11 RX ADMIN — DORZOLAMIDE HYDROCHLORIDE TIMOLOL MALEATE 1 DROP(S): 20; 5 SOLUTION/ DROPS OPHTHALMIC at 07:55

## 2019-10-11 RX ADMIN — Medication 1: at 11:44

## 2019-10-11 NOTE — DISCHARGE NOTE PROVIDER - NSDCACTIVITY_GEN_ALL_CORE
Do not drive or operate machinery/Do not make important decisions/Walking - Indoors allowed/No heavy lifting/straining/Stairs allowed/Showering allowed

## 2019-10-11 NOTE — DISCHARGE NOTE PROVIDER - INSTRUCTIONS
Regular consistency with thin liquids  Diabetic (consistent carbohydrate)  Limit sodium and cholesterol containing foods  Limit potassium and phosphorus containing foods

## 2019-10-11 NOTE — DISCHARGE NOTE PROVIDER - HOSPITAL COURSE
Hospital Course:    Mrs. Aburto is a 59 year old woman right hand dominant with past medical history of stroke 3 years ago (presented with right hemiparesis/dysarthria), HTN, T2DM (with retinopathy?), CKD stage III, glaucoma? with right eye blindness, and hearing loss (has hearing aids) who presented with right hand weakness/numbness and right mouth numbness to [facility] on 9/29/19. LKN was 18:30 on 9/27.  Brain MRI revealed acute infarct at the left thalamus. Head and neck MRA with areas of significant constriction follow by dilation (beads in a string pattern) at the anterior and posterior cerebral arteries noted to be concerning for vasculitis. Rheumatology was consulted for concerns of vasculitis, but her workup was unrevealing. Per Neuro, etiology of stroke likely small vessel disease vs less likely CNS vasculitis as they suspect atherosclerotic changes in the setting of poorly controlled vascular risk factors. Nephrology consulted for BREANNE on CKD.        Rehab Course:    Mrs. Aburto was admitted to the [facility] Unit on 10/4/19 for acute rehabilitation. She participated in a comprehensive rehabilitation course consisting of physical, occupational, and speech/swallow therapy. Nephrology was consulted for continued management of BREANNE on CKD. She was started on amlodipine for hypertension during her rehab stay, with improvement of blood pressure. Constipation was managed with stool softeners and laxatives. She made progress in therapy and at the time of discharge she was ambulating with a cane or walker, completing transfers, and completing ADL's at a modified independent level. She is medically cleared to be discharged to home with home care. Hospital Course:    Mrs. Aburto is a 59 year old woman right hand dominant with past medical history of stroke 3 years ago (presented with right hemiparesis/dysarthria), HTN, T2DM (with retinopathy?), CKD stage III, glaucoma? with right eye blindness, and hearing loss (has hearing aids) who presented with right hand weakness/numbness and right mouth numbness to [facility] on 9/29/19. LKN was 18:30 on 9/27.  Brain MRI revealed acute infarct at the left thalamus. Head and neck MRA with areas of significant constriction follow by dilation (beads in a string pattern) at the anterior and posterior cerebral arteries noted to be concerning for vasculitis. Rheumatology was consulted for concerns of vasculitis, but her workup was unrevealing. Per Neuro, etiology of stroke likely small vessel disease vs less likely CNS vasculitis as they suspect atherosclerotic changes in the setting of poorly controlled vascular risk factors. Nephrology consulted for BREANNE on CKD.        Rehab Course:    Mrs. Aburto was admitted to the [facility] Brain Injury Unit on 10/4/19 for acute rehabilitation. She participated in a comprehensive rehabilitation course consisting of physical, occupational, and speech/swallow therapy. Nephrology was consulted for continued management of BREANNE on CKD. She was started on amlodipine for hypertension during her rehab stay, with improvement of blood pressure. Constipation was managed with stool softeners and laxatives. She made progress in therapy and at the time of discharge she was ambulating with a cane or walker, completing transfers, and completing ADL's at a modified independent level. She is medically cleared to be discharged to home with home care.         Addendum 10/14:     Patient has history of insuline dependent type II diabetes with hyperglycemia, with suspected diabetic retinopathy.

## 2019-10-11 NOTE — DISCHARGE NOTE NURSING/CASE MANAGEMENT/SOCIAL WORK - NSDCPEPTSTRK_GEN_ALL_CORE
Stroke support groups for patients, families, and friends/Stroke warning signs and symptoms/Signs and symptoms of stroke/Prescribed medications/Risk factors for stroke/Call 911 for stroke/Need for follow up after discharge/Stroke education booklet

## 2019-10-11 NOTE — DISCHARGE NOTE PROVIDER - CARE PROVIDERS DIRECT ADDRESSES
[email or web address],DirectAddress_Unknown,[email or web address]

## 2019-10-11 NOTE — PROGRESS NOTE ADULT - SUBJECTIVE AND OBJECTIVE BOX
No complaints    Vital Signs Last 24 Hrs  T(C): 36.8 (10-11-19 @ 07:50), Max: 36.9 (10-10-19 @ 21:29)  T(F): 98.3 (10-11-19 @ 07:50), Max: 98.4 (10-10-19 @ 21:29)  HR: 77 (10-11-19 @ 07:50) (76 - 80)  BP: 115/80 (10-11-19 @ 07:50) (115/80 - 125/90)  RR: 13 (10-11-19 @ 07:50) (13 - 16)  SpO2: 96% (10-11-19 @ 07:50) (96% - 100%)    Card S1S2  Lungs b/l air entry  Abd soft, NT, ND  Extr no edema             amLODIPine   Tablet 10 milliGRAM(s) Oral daily  aspirin  chewable 81 milliGRAM(s) Oral daily  atorvastatin 80 milliGRAM(s) Oral at bedtime  bisacodyl 5 milliGRAM(s) Oral every 12 hours PRN  brimonidine 0.2% Ophthalmic Solution 1 Drop(s) Both EYES three times a day  clopidogrel Tablet 75 milliGRAM(s) Oral daily  dextrose 40% Gel 15 Gram(s) Oral once PRN  dextrose 5%. 1000 milliLiter(s) IV Continuous <Continuous>  dextrose 50% Injectable 12.5 Gram(s) IV Push once  dextrose 50% Injectable 25 Gram(s) IV Push once  dorzolamide 2%/timolol 0.5% Ophthalmic Solution 1 Drop(s) Right EYE <User Schedule>  enoxaparin Injectable 30 milliGRAM(s) SubCutaneous daily  glucagon  Injectable 1 milliGRAM(s) IntraMuscular once PRN  insulin glargine Injectable (LANTUS) 15 Unit(s) SubCutaneous at bedtime  insulin lispro (HumaLOG) corrective regimen sliding scale   SubCutaneous three times a day before meals  latanoprost 0.005% Ophthalmic Solution 1 Drop(s) Right EYE at bedtime  polyethylene glycol 3350 17 Gram(s) Oral daily  senna 2 Tablet(s) Oral at bedtime  sodium bicarbonate 650 milliGRAM(s) Oral two times a day    A/P:    S/p CVA  HTN, CKD 3  Stable lytes, renal fx as of most recent bld work  BP acceptable  Avoid nephrotoxins  No NSAID's, d/w pt  BMP on Monday (pt agreed)  Rehab

## 2019-10-11 NOTE — DISCHARGE NOTE PROVIDER - CARE PROVIDER_API CALL
Nessa Blevins (DO)  Brain Injury Medicine; PhysicalRehab Medicine  [address] [place] [address]  Phone: [phone]  Fax: [phone]  Follow Up Time:     Aura Rodriguez; MPH)  Internal Medicine; Rheumatology  [address], [address]  [place] [address]  Phone: [phone]  Fax: [phone]  Follow Up Time:     Otis Bourne (DO)  Neurology; Vascular Neurology  [address]  [place] [address]  Phone: [phone]  Fax: [phone]  Follow Up Time:     Kadie Wakefield (DO)  Nephrology  [address]  [place] [address]  Phone: [phone]  Fax: [phone]  Follow Up Time:     Ed Gillette (DO)  EndocrinologyMetabDiabetes; Internal Medicine  [address] [place] [address]  Phone: [phone]  Fax: [phone]  Follow Up Time:

## 2019-10-11 NOTE — PROGRESS NOTE ADULT - ATTENDING COMMENTS
Pt. seen.  Agree with documentation above as per resident with amendments made as appropriate. Patient medically stable. Making progress towards rehab goals.     d/c planning to home tomorrow

## 2019-10-11 NOTE — DISCHARGE NOTE PROVIDER - PROVIDER TOKENS
PROVIDER:[TOKEN:[[id number]]],PROVIDER:[TOKEN:[[id number]]],PROVIDER:[TOKEN:[[id number]]],PROVIDER:[TOKEN:[[id number]]],PROVIDER:[TOKEN:[[id number]]]

## 2019-10-11 NOTE — DISCHARGE NOTE PROVIDER - NSDCCPCAREPLAN_GEN_ALL_CORE_FT
PRINCIPAL DISCHARGE DIAGNOSIS  Diagnosis: Stroke  Assessment and Plan of Treatment:   - Continue taking aspirin 81mg daily  - Continue taking Clopidogrel (Plavix) 75mg daily until 10/23/19, then stop.   - Continue taking Atorvastatin (Lipitor) 80mg daily  - Follow up with neurology and cardiology as outpatient      SECONDARY DISCHARGE DIAGNOSES  Diagnosis: Type 2 DM with CKD and hypertension  Assessment and Plan of Treatment:   - Continue Lantus 15U at bedtime  - Check blood sugar twice a day  - Follow up with your primary care doctor and endocrinologist   - Follow up with nephrology for chronic kidney disease  - - Continue Amlodipine 10mg daily for hypertension    Diagnosis: Constipation  Assessment and Plan of Treatment:   - Continue miralax daily  - Continue sennakot daily  - Eat high fiber foods. PRINCIPAL DISCHARGE DIAGNOSIS  Diagnosis: Stroke  Assessment and Plan of Treatment: - Continue taking aspirin 81mg daily  - Continue taking Clopidogrel (Plavix) 75mg daily until 10/23/19, then stop.   - Continue taking Atorvastatin (Lipitor) 80mg daily  - Follow up with neurology, rheumatology and cardiology as outpatient      SECONDARY DISCHARGE DIAGNOSES  Diagnosis: Type 2 DM with CKD and hypertension  Assessment and Plan of Treatment:   - Continue Lantus 15U at bedtime  - Check blood sugar twice a day  - Follow up with your primary care doctor and endocrinologist   - Follow up with nephrology for chronic kidney disease  - - Continue Amlodipine 10mg daily for hypertension    Diagnosis: Constipation  Assessment and Plan of Treatment:   - Continue miralax daily  - Continue sennakot daily  - Eat high fiber foods.

## 2019-10-11 NOTE — PROGRESS NOTE ADULT - SUBJECTIVE AND OBJECTIVE BOX
DAILY PROGRESS NOTE:  HPI: 59F RHD pmh stroke? 3 years ago (presented with right hemiparesis/dysarthria), HTN, T2DM (with retinopathy?), glaucoma? with right eye blindness, and hearing loss (has hearing aids) who presented with right hand weakness/numbness and right mouth numbness to [facility] on 9/29/19. LKN was 18:30 on 9/27.  Brain MRI revealed acute infarct at the left thalamus. Head and neck MRA with areas of significant constriction follow by dilation (beads in a string pattern) at the anterior and posterior cerebral arteries noted to be concerning for vasculitis. Rheumatology was consulted for concerns of vasculitis, but her workup was unrevealing. Per Neuro, etiology of stroke likely small vessel disease vs less likely CNS vasculitis as they suspect atherosclerotic changes in the setting of poorly controlled vascular risk factors. (04 Oct 2019 13:12)    Subjective: Patient seen and examined this morning, reports continued constipation. Requesting to be discharged with rollator as she feels more steady with it compared to the cane. Discussed with PT who will trial walker and cane with her in session today.      ROS:  + R eye blind  + Right sided weakness  + R hand numbness  + Constipation  No chest pain, sob, nausea, vomiting, diarrhea, or incontinence    Physical Exam:  Vital Signs Last 24 Hrs  T(C): 36.8 (11 Oct 2019 07:50), Max: 36.9 (10 Oct 2019 21:29)  T(F): 98.3 (11 Oct 2019 07:50), Max: 98.4 (10 Oct 2019 21:29)  HR: 77 (11 Oct 2019 07:50) (76 - 80)  BP: 115/80 (11 Oct 2019 07:50) (115/80 - 125/90)  BP(mean): --  RR: 13 (11 Oct 2019 07:50) (13 - 16)  SpO2: 96% (11 Oct 2019 07:50) (96% - 100%)    Constitutional - NAD, Comfortable  Eyes - Right eye blind  Chest - CTAB  Cardiovascular - RRR, S1S2, no m/r/g  Abdomen - BS+, Soft, NTND  Extremities - trace bilateral foot and ankle edema, non-pitting; No calf tenderness   Neurologic Exam -                    Cognitive - Awake, Alert, AAO to self, place, date, year, situation     Communication - Fluent, No dysarthria     Motor - No focal deficits  	                  LEFT    UE - ShAB 4/5, EF 4/5, EE 4/5, WE 4/5,  5/5  	                  RIGHT UE - ShAB 4/5, EF 4/5, EE 4/5, WE 4/5,  5/5  	                  LEFT    LE - HF 5/5, KE 5/5, DF 5/5, PF 5/5  	        RIGHT LE - HF 5/5, KE 5/5, DF 5/5, PF 5/5        Sensory - Intact to LT     Reflexes - 2+ b/l patellar, symmetric  Psychiatric - Mood stable, Affect WNL    Recent Labs/Imaging:  CAPILLARY BLOOD GLUCOSE  POCT Blood Glucose.: 154 mg/dL (11 Oct 2019 11:42)  POCT Blood Glucose.: 77 mg/dL (11 Oct 2019 07:53)  POCT Blood Glucose.: 193 mg/dL (10 Oct 2019 16:49)    MEDICATIONS  (STANDING):  amLODIPine   Tablet 10 milliGRAM(s) Oral daily  aspirin  chewable 81 milliGRAM(s) Oral daily  atorvastatin 80 milliGRAM(s) Oral at bedtime  brimonidine 0.2% Ophthalmic Solution 1 Drop(s) Both EYES three times a day  clopidogrel Tablet 75 milliGRAM(s) Oral daily  dextrose 5%. 1000 milliLiter(s) (50 mL/Hr) IV Continuous <Continuous>  dextrose 50% Injectable 12.5 Gram(s) IV Push once  dextrose 50% Injectable 25 Gram(s) IV Push once  dorzolamide 2%/timolol 0.5% Ophthalmic Solution 1 Drop(s) Right EYE <User Schedule>  enoxaparin Injectable 30 milliGRAM(s) SubCutaneous daily  insulin glargine Injectable (LANTUS) 15 Unit(s) SubCutaneous at bedtime  insulin lispro (HumaLOG) corrective regimen sliding scale   SubCutaneous three times a day before meals  latanoprost 0.005% Ophthalmic Solution 1 Drop(s) Right EYE at bedtime  polyethylene glycol 3350 17 Gram(s) Oral daily  sodium bicarbonate 650 milliGRAM(s) Oral two times a day    MEDICATIONS  (PRN):  dextrose 40% Gel 15 Gram(s) Oral once PRN Blood Glucose LESS THAN 70 milliGRAM(s)/deciliter  glucagon  Injectable 1 milliGRAM(s) IntraMuscular once PRN Glucose LESS THAN 70 milligrams/deciliter    Assessment/Plan:  DEBORAH NUNEZ is a 59F pmh stroke? 3 years ago (presented with right hemiparesis/dysarthria), HTN, T2DM (with retinopathy?), glaucoma with right eye blindness, and hearing loss (has hearing aids) who presented with right hand weakness/numbness and right mouth numbness to [facility] on 9/29/19 found to have an acute left thalamic infarct, likely secondary to small vessel disease vs CNS vasculitis.    CVA  - left thalamic infarct with right hand and mouth weakness and numbness  - continue Aspirin  - continue Plavix for a total of 3 weeks (until 10/23)  - continue statin    Rehab: Continue PT/OT/SLT    Hypertension: Home med Lisinopril/HCTZ  - BP improved with amlodipine  - Continue amlodipine 10mg daily, started on 10/8  - Will hold off on restarting home Lisinopril/HCTZ due to renal function  - Hospitalist consult appreciated    Glaucoma: Continue Latanoprost, Dorzolamide and Brimonidine gtt    BREANNE on CKD Stage III v. IV:   - Creatinine stable, unclear what her baseline is  - Hold Lisinopril/HCTZ  - Hospitalist input appreciated    DM: A1c 9.6%, patient took insulin and janumet at home.   - Endocrine following at [facility]  - FS better controlled  - Continue Lantus 15 units  - Continue sliding scale    Constipation: started Miralax, still no BM, will add senna and dulcolax PRN    Dependent edema: Encouraged pt to elevate legs when in bed.  - SAM stockings    DVT ppx: Lovenox  Diet: Consistent Carb  Dispo: IDT meeting 10/10:  - OT: Set up/supervision, Family training completed  - PT: Supervision, Family training completed  - SLT: Mild problem solving and memory difficulties, Min A for Cognition.  - Anticipate patient to meet functional goal of Mod I with ADL's and ambulation by 10/12

## 2019-10-12 VITALS
DIASTOLIC BLOOD PRESSURE: 82 MMHG | SYSTOLIC BLOOD PRESSURE: 151 MMHG | TEMPERATURE: 98 F | HEART RATE: 78 BPM | OXYGEN SATURATION: 99 % | RESPIRATION RATE: 14 BRPM

## 2019-10-12 LAB
GLUCOSE BLDC GLUCOMTR-MCNC: 168 MG/DL — HIGH (ref 70–99)
GLUCOSE BLDC GLUCOMTR-MCNC: 78 MG/DL — SIGNIFICANT CHANGE UP (ref 70–99)

## 2019-10-12 PROCEDURE — 92610 EVALUATE SWALLOWING FUNCTION: CPT

## 2019-10-12 PROCEDURE — 97535 SELF CARE MNGMENT TRAINING: CPT

## 2019-10-12 PROCEDURE — 82962 GLUCOSE BLOOD TEST: CPT

## 2019-10-12 PROCEDURE — 97110 THERAPEUTIC EXERCISES: CPT

## 2019-10-12 PROCEDURE — 97530 THERAPEUTIC ACTIVITIES: CPT

## 2019-10-12 PROCEDURE — 92507 TX SP LANG VOICE COMM INDIV: CPT

## 2019-10-12 PROCEDURE — 80048 BASIC METABOLIC PNL TOTAL CA: CPT

## 2019-10-12 PROCEDURE — 97112 NEUROMUSCULAR REEDUCATION: CPT

## 2019-10-12 PROCEDURE — 36415 COLL VENOUS BLD VENIPUNCTURE: CPT

## 2019-10-12 PROCEDURE — 85027 COMPLETE CBC AUTOMATED: CPT

## 2019-10-12 PROCEDURE — 97167 OT EVAL HIGH COMPLEX 60 MIN: CPT

## 2019-10-12 PROCEDURE — 97116 GAIT TRAINING THERAPY: CPT

## 2019-10-12 PROCEDURE — 99238 HOSP IP/OBS DSCHRG MGMT 30/<: CPT

## 2019-10-12 PROCEDURE — 92523 SPEECH SOUND LANG COMPREHEN: CPT

## 2019-10-12 PROCEDURE — 97163 PT EVAL HIGH COMPLEX 45 MIN: CPT

## 2019-10-12 RX ADMIN — Medication 650 MILLIGRAM(S): at 06:01

## 2019-10-12 RX ADMIN — Medication 5 MILLIGRAM(S): at 06:03

## 2019-10-12 RX ADMIN — ENOXAPARIN SODIUM 30 MILLIGRAM(S): 100 INJECTION SUBCUTANEOUS at 11:47

## 2019-10-12 RX ADMIN — Medication 81 MILLIGRAM(S): at 11:47

## 2019-10-12 RX ADMIN — Medication 1: at 11:46

## 2019-10-12 RX ADMIN — CLOPIDOGREL BISULFATE 75 MILLIGRAM(S): 75 TABLET, FILM COATED ORAL at 11:47

## 2019-10-12 RX ADMIN — BRIMONIDINE TARTRATE 1 DROP(S): 2 SOLUTION/ DROPS OPHTHALMIC at 13:10

## 2019-10-12 RX ADMIN — DORZOLAMIDE HYDROCHLORIDE TIMOLOL MALEATE 1 DROP(S): 20; 5 SOLUTION/ DROPS OPHTHALMIC at 08:27

## 2019-10-12 RX ADMIN — BRIMONIDINE TARTRATE 1 DROP(S): 2 SOLUTION/ DROPS OPHTHALMIC at 06:01

## 2019-10-12 RX ADMIN — AMLODIPINE BESYLATE 10 MILLIGRAM(S): 2.5 TABLET ORAL at 06:01

## 2019-10-12 RX ADMIN — POLYETHYLENE GLYCOL 3350 17 GRAM(S): 17 POWDER, FOR SOLUTION ORAL at 11:47

## 2019-10-12 NOTE — PROGRESS NOTE ADULT - ASSESSMENT
DEBORAH NUNEZ is a 59F pmh stroke? 3 years ago (presented with right hemiparesis/dysarthria), HTN, T2DM (with retinopathy?), glaucoma with right eye blindness, and hearing loss (has hearing aids) who presented with right hand weakness/numbness and right mouth numbness to [facility] on 9/29/19 found to have an acute left thalamic infarct, likely secondary to small vessel disease vs CNS vasculitis.    Patient medically stable for discharge to home.  Discharge medications and instructions reviewed.  All questions answered.     CVA  - left thalamic infarct with right hand and mouth weakness and numbness  - continue Aspirin  - continue Plavix for a total of 3 weeks (until 10/23)  - continue statin    Rehab: Continue PT/OT/SLT    Hypertension: Home med Lisinopril/HCTZ  - BP improved with amlodipine  - Continue amlodipine 10mg daily, started on 10/8  - Will hold off on restarting home Lisinopril/HCTZ due to renal function      Glaucoma: Continue Latanoprost, Dorzolamide and Brimonidine gtt    BREANNE on CKD Stage III v. IV:   - Creatinine stable, unclear what her baseline is  - Hold Lisinopril/HCTZ  Nephrology following    DM: A1c 9.6%, patient took insulin and janumet at home.   - discharge home on solostar Lantus Pen 15 units      Diet: Consistent Carb    Dispo: IDT meeting 10/10:  - OT: Set up/supervision, Family training completed  - PT: Supervision, Family training completed  - SLT: Mild problem solving and memory difficulties, Min A for Cognition.  - Anticipate patient to meet functional goal of Mod I with ADL's and ambulation by 10/12

## 2019-10-12 NOTE — PROGRESS NOTE ADULT - SUBJECTIVE AND OBJECTIVE BOX
Subjective: No new complaints or overnight issues      REVIEW OF SYSTEMS  Pertinent in last 24 h: + R eye blind  + Right sided weakness  + R hand numbness  No chest pain, sob, nausea, vomiting, diarrhea, or incontinence      VITALS  T(C): 36.6 (10-12-19 @ 08:18), Max: 36.6 (10-11-19 @ 20:19)  HR: 78 (10-12-19 @ 08:18) (78 - 84)  BP: 151/82 (10-12-19 @ 08:18) (151/82 - 155/84)  RR: 14 (10-12-19 @ 08:18) (14 - 14)  SpO2: 99% (10-12-19 @ 08:18) (97% - 99%)  Wt(kg): --    Physical Exam:  -Constitutional - NAD, Comfortable  Eyes - Right eye blind  Chest - CTAB  Cardiovascular - RRR, S1S2, no m/r/g  Abdomen - BS+, Soft, NTND  Extremities - trace bilateral foot and ankle edema, non-pitting; No calf tenderness   Neurologic Exam -                    Cognitive - Awake, Alert, AAO to self, place, date, year, situation     Communication - Fluent, No dysarthria     Motor - No focal deficits  	                  LEFT    UE - ShAB 4/5, EF 4/5, EE 4/5, WE 4/5,  5/5  	                  RIGHT UE - ShAB 4/5, EF 4/5, EE 4/5, WE 4/5,  5/5  	                  LEFT    LE - HF 5/5, KE 5/5, DF 5/5, PF 5/5  	        RIGHT LE - HF 5/5, KE 5/5, DF 5/5, PF 5/5        Sensory - Intact to LT     Reflexes - 2+ b/l patellar, symmetric  Psychiatric - Mood stable, Affect WNL      RECENT LABS/IMAGING                  MEDICATIONS   amLODIPine   Tablet 10 milliGRAM(s) daily  aspirin  chewable 81 milliGRAM(s) daily  atorvastatin 80 milliGRAM(s) at bedtime  bisacodyl 5 milliGRAM(s) every 12 hours PRN  brimonidine 0.2% Ophthalmic Solution 1 Drop(s) three times a day  clopidogrel Tablet 75 milliGRAM(s) daily  dextrose 40% Gel 15 Gram(s) once PRN  dextrose 5%. 1000 milliLiter(s) <Continuous>  dextrose 50% Injectable 12.5 Gram(s) once  dextrose 50% Injectable 25 Gram(s) once  dorzolamide 2%/timolol 0.5% Ophthalmic Solution 1 Drop(s) <User Schedule>  enoxaparin Injectable 30 milliGRAM(s) daily  glucagon  Injectable 1 milliGRAM(s) once PRN  insulin glargine Injectable (LANTUS) 15 Unit(s) at bedtime  insulin lispro (HumaLOG) corrective regimen sliding scale   three times a day before meals  latanoprost 0.005% Ophthalmic Solution 1 Drop(s) at bedtime  polyethylene glycol 3350 17 Gram(s) daily  senna 2 Tablet(s) at bedtime  sodium bicarbonate 650 milliGRAM(s) two times a day      --------------------------------------------------------------------

## 2019-10-15 ENCOUNTER — INPATIENT (INPATIENT)
Facility: HOSPITAL | Age: 59
LOS: 1 days | Discharge: HOME | DRG: 149 | End: 2019-10-17
Attending: HOSPITALIST | Admitting: INTERNAL MEDICINE
Payer: COMMERCIAL

## 2019-10-15 VITALS
SYSTOLIC BLOOD PRESSURE: 176 MMHG | OXYGEN SATURATION: 100 % | WEIGHT: 117.51 LBS | RESPIRATION RATE: 17 BRPM | HEIGHT: 62 IN | HEART RATE: 91 BPM | TEMPERATURE: 98 F | DIASTOLIC BLOOD PRESSURE: 100 MMHG

## 2019-10-15 DIAGNOSIS — E11.22 TYPE 2 DIABETES MELLITUS WITH DIABETIC CHRONIC KIDNEY DISEASE: ICD-10-CM

## 2019-10-15 DIAGNOSIS — R53.1 WEAKNESS: ICD-10-CM

## 2019-10-15 DIAGNOSIS — I63.9 CEREBRAL INFARCTION, UNSPECIFIED: ICD-10-CM

## 2019-10-15 DIAGNOSIS — H40.9 UNSPECIFIED GLAUCOMA: ICD-10-CM

## 2019-10-15 DIAGNOSIS — I10 ESSENTIAL (PRIMARY) HYPERTENSION: ICD-10-CM

## 2019-10-15 LAB
ALBUMIN SERPL ELPH-MCNC: 3.2 G/DL — LOW (ref 3.3–5)
ALP SERPL-CCNC: 100 U/L — SIGNIFICANT CHANGE UP (ref 40–120)
ALT FLD-CCNC: 27 U/L — SIGNIFICANT CHANGE UP (ref 10–45)
ANION GAP SERPL CALC-SCNC: 13 MMOL/L — SIGNIFICANT CHANGE UP (ref 5–17)
APPEARANCE UR: CLEAR — SIGNIFICANT CHANGE UP
APTT BLD: 28.2 SEC — SIGNIFICANT CHANGE UP (ref 27.5–36.3)
AST SERPL-CCNC: 32 U/L — SIGNIFICANT CHANGE UP (ref 10–40)
BACTERIA # UR AUTO: ABNORMAL /HPF
BILIRUB SERPL-MCNC: 0.4 MG/DL — SIGNIFICANT CHANGE UP (ref 0.2–1.2)
BILIRUB UR-MCNC: NEGATIVE — SIGNIFICANT CHANGE UP
BUN SERPL-MCNC: 42 MG/DL — HIGH (ref 7–23)
CALCIUM SERPL-MCNC: 10.1 MG/DL — SIGNIFICANT CHANGE UP (ref 8.4–10.5)
CHLORIDE SERPL-SCNC: 106 MMOL/L — SIGNIFICANT CHANGE UP (ref 96–108)
CO2 SERPL-SCNC: 23 MMOL/L — SIGNIFICANT CHANGE UP (ref 22–31)
COLOR SPEC: YELLOW — SIGNIFICANT CHANGE UP
CREAT SERPL-MCNC: 1.49 MG/DL — HIGH (ref 0.5–1.3)
DIFF PNL FLD: ABNORMAL
EPI CELLS # UR: ABNORMAL
GLUCOSE BLDC GLUCOMTR-MCNC: 172 MG/DL — HIGH (ref 70–99)
GLUCOSE BLDC GLUCOMTR-MCNC: 174 MG/DL — HIGH (ref 70–99)
GLUCOSE BLDC GLUCOMTR-MCNC: 209 MG/DL — HIGH (ref 70–99)
GLUCOSE SERPL-MCNC: 180 MG/DL — HIGH (ref 70–99)
GLUCOSE UR QL: 100 MG/DL
HCT VFR BLD CALC: 34.2 % — LOW (ref 34.5–45)
HGB BLD-MCNC: 10.7 G/DL — LOW (ref 11.5–15.5)
HYALINE CASTS # UR AUTO: ABNORMAL (ref 0–7)
INR BLD: 0.9 RATIO — SIGNIFICANT CHANGE UP (ref 0.88–1.16)
KETONES UR-MCNC: ABNORMAL
LEUKOCYTE ESTERASE UR-ACNC: ABNORMAL
MCHC RBC-ENTMCNC: 26.4 PG — LOW (ref 27–34)
MCHC RBC-ENTMCNC: 31.3 GM/DL — LOW (ref 32–36)
MCV RBC AUTO: 84.2 FL — SIGNIFICANT CHANGE UP (ref 80–100)
NITRITE UR-MCNC: NEGATIVE — SIGNIFICANT CHANGE UP
NRBC # BLD: 0 /100 WBCS — SIGNIFICANT CHANGE UP (ref 0–0)
PH UR: 5 — SIGNIFICANT CHANGE UP (ref 5–8)
PLATELET # BLD AUTO: 271 K/UL — SIGNIFICANT CHANGE UP (ref 150–400)
POTASSIUM SERPL-MCNC: 4.9 MMOL/L — SIGNIFICANT CHANGE UP (ref 3.5–5.3)
POTASSIUM SERPL-SCNC: 4.9 MMOL/L — SIGNIFICANT CHANGE UP (ref 3.5–5.3)
PROT SERPL-MCNC: 7.9 G/DL — SIGNIFICANT CHANGE UP (ref 6–8.3)
PROT UR-MCNC: 500 MG/DL
PROTHROM AB SERPL-ACNC: 10.1 SEC — SIGNIFICANT CHANGE UP (ref 10–12.9)
RBC # BLD: 4.06 M/UL — SIGNIFICANT CHANGE UP (ref 3.8–5.2)
RBC # FLD: 13.3 % — SIGNIFICANT CHANGE UP (ref 10.3–14.5)
RBC CASTS # UR COMP ASSIST: SIGNIFICANT CHANGE UP /HPF (ref 0–4)
SODIUM SERPL-SCNC: 142 MMOL/L — SIGNIFICANT CHANGE UP (ref 135–145)
SP GR SPEC: 1.02 — SIGNIFICANT CHANGE UP (ref 1.01–1.02)
TROPONIN I SERPL-MCNC: <.017 NG/ML — LOW (ref 0.02–0.06)
UROBILINOGEN FLD QL: NEGATIVE — SIGNIFICANT CHANGE UP
WBC # BLD: 10.15 K/UL — SIGNIFICANT CHANGE UP (ref 3.8–10.5)
WBC # FLD AUTO: 10.15 K/UL — SIGNIFICANT CHANGE UP (ref 3.8–10.5)
WBC UR QL: ABNORMAL /HPF (ref 0–5)

## 2019-10-15 PROCEDURE — 70450 CT HEAD/BRAIN W/O DYE: CPT | Mod: 26

## 2019-10-15 PROCEDURE — 93010 ELECTROCARDIOGRAM REPORT: CPT

## 2019-10-15 PROCEDURE — 99223 1ST HOSP IP/OBS HIGH 75: CPT

## 2019-10-15 PROCEDURE — 99285 EMERGENCY DEPT VISIT HI MDM: CPT

## 2019-10-15 RX ORDER — DORZOLAMIDE HYDROCHLORIDE TIMOLOL MALEATE 20; 5 MG/ML; MG/ML
1 SOLUTION/ DROPS OPHTHALMIC
Refills: 0 | Status: DISCONTINUED | OUTPATIENT
Start: 2019-10-15 | End: 2019-10-17

## 2019-10-15 RX ORDER — AMLODIPINE BESYLATE 2.5 MG/1
10 TABLET ORAL ONCE
Refills: 0 | Status: COMPLETED | OUTPATIENT
Start: 2019-10-15 | End: 2019-10-15

## 2019-10-15 RX ORDER — MECLIZINE HCL 12.5 MG
25 TABLET ORAL EVERY 8 HOURS
Refills: 0 | Status: DISCONTINUED | OUTPATIENT
Start: 2019-10-15 | End: 2019-10-17

## 2019-10-15 RX ORDER — ENOXAPARIN SODIUM 100 MG/ML
40 INJECTION SUBCUTANEOUS DAILY
Refills: 0 | Status: DISCONTINUED | OUTPATIENT
Start: 2019-10-15 | End: 2019-10-17

## 2019-10-15 RX ORDER — SODIUM BICARBONATE 1 MEQ/ML
650 SYRINGE (ML) INTRAVENOUS DAILY
Refills: 0 | Status: DISCONTINUED | OUTPATIENT
Start: 2019-10-15 | End: 2019-10-17

## 2019-10-15 RX ORDER — ASPIRIN/CALCIUM CARB/MAGNESIUM 324 MG
81 TABLET ORAL DAILY
Refills: 0 | Status: DISCONTINUED | OUTPATIENT
Start: 2019-10-15 | End: 2019-10-17

## 2019-10-15 RX ORDER — INSULIN LISPRO 100/ML
VIAL (ML) SUBCUTANEOUS AT BEDTIME
Refills: 0 | Status: DISCONTINUED | OUTPATIENT
Start: 2019-10-15 | End: 2019-10-17

## 2019-10-15 RX ORDER — INSULIN GLARGINE 100 [IU]/ML
15 INJECTION, SOLUTION SUBCUTANEOUS AT BEDTIME
Refills: 0 | Status: DISCONTINUED | OUTPATIENT
Start: 2019-10-15 | End: 2019-10-17

## 2019-10-15 RX ORDER — BRIMONIDINE TARTRATE 2 MG/MG
1 SOLUTION/ DROPS OPHTHALMIC THREE TIMES A DAY
Refills: 0 | Status: DISCONTINUED | OUTPATIENT
Start: 2019-10-15 | End: 2019-10-17

## 2019-10-15 RX ORDER — DEXTROSE 50 % IN WATER 50 %
15 SYRINGE (ML) INTRAVENOUS ONCE
Refills: 0 | Status: DISCONTINUED | OUTPATIENT
Start: 2019-10-15 | End: 2019-10-17

## 2019-10-15 RX ORDER — DEXTROSE 50 % IN WATER 50 %
25 SYRINGE (ML) INTRAVENOUS ONCE
Refills: 0 | Status: DISCONTINUED | OUTPATIENT
Start: 2019-10-15 | End: 2019-10-17

## 2019-10-15 RX ORDER — INSULIN LISPRO 100/ML
VIAL (ML) SUBCUTANEOUS
Refills: 0 | Status: DISCONTINUED | OUTPATIENT
Start: 2019-10-15 | End: 2019-10-17

## 2019-10-15 RX ORDER — CLOPIDOGREL BISULFATE 75 MG/1
75 TABLET, FILM COATED ORAL DAILY
Refills: 0 | Status: DISCONTINUED | OUTPATIENT
Start: 2019-10-15 | End: 2019-10-17

## 2019-10-15 RX ORDER — ATORVASTATIN CALCIUM 80 MG/1
80 TABLET, FILM COATED ORAL AT BEDTIME
Refills: 0 | Status: DISCONTINUED | OUTPATIENT
Start: 2019-10-15 | End: 2019-10-17

## 2019-10-15 RX ORDER — SODIUM CHLORIDE 9 MG/ML
1000 INJECTION INTRAMUSCULAR; INTRAVENOUS; SUBCUTANEOUS ONCE
Refills: 0 | Status: COMPLETED | OUTPATIENT
Start: 2019-10-15 | End: 2019-10-15

## 2019-10-15 RX ORDER — GLUCAGON INJECTION, SOLUTION 0.5 MG/.1ML
1 INJECTION, SOLUTION SUBCUTANEOUS ONCE
Refills: 0 | Status: DISCONTINUED | OUTPATIENT
Start: 2019-10-15 | End: 2019-10-17

## 2019-10-15 RX ORDER — SODIUM CHLORIDE 9 MG/ML
1000 INJECTION, SOLUTION INTRAVENOUS
Refills: 0 | Status: DISCONTINUED | OUTPATIENT
Start: 2019-10-15 | End: 2019-10-17

## 2019-10-15 RX ORDER — METOPROLOL TARTRATE 50 MG
12.5 TABLET ORAL
Refills: 0 | Status: DISCONTINUED | OUTPATIENT
Start: 2019-10-15 | End: 2019-10-17

## 2019-10-15 RX ORDER — DEXTROSE 50 % IN WATER 50 %
12.5 SYRINGE (ML) INTRAVENOUS ONCE
Refills: 0 | Status: DISCONTINUED | OUTPATIENT
Start: 2019-10-15 | End: 2019-10-17

## 2019-10-15 RX ORDER — LATANOPROST 0.05 MG/ML
1 SOLUTION/ DROPS OPHTHALMIC; TOPICAL AT BEDTIME
Refills: 0 | Status: DISCONTINUED | OUTPATIENT
Start: 2019-10-15 | End: 2019-10-17

## 2019-10-15 RX ADMIN — SODIUM CHLORIDE 1000 MILLILITER(S): 9 INJECTION INTRAMUSCULAR; INTRAVENOUS; SUBCUTANEOUS at 19:00

## 2019-10-15 RX ADMIN — AMLODIPINE BESYLATE 10 MILLIGRAM(S): 2.5 TABLET ORAL at 16:34

## 2019-10-15 RX ADMIN — INSULIN GLARGINE 15 UNIT(S): 100 INJECTION, SOLUTION SUBCUTANEOUS at 22:43

## 2019-10-15 RX ADMIN — SODIUM CHLORIDE 1000 MILLILITER(S): 9 INJECTION INTRAMUSCULAR; INTRAVENOUS; SUBCUTANEOUS at 16:33

## 2019-10-15 RX ADMIN — BRIMONIDINE TARTRATE 1 DROP(S): 2 SOLUTION/ DROPS OPHTHALMIC at 22:38

## 2019-10-15 RX ADMIN — LATANOPROST 1 DROP(S): 0.05 SOLUTION/ DROPS OPHTHALMIC; TOPICAL at 22:37

## 2019-10-15 RX ADMIN — ATORVASTATIN CALCIUM 80 MILLIGRAM(S): 80 TABLET, FILM COATED ORAL at 22:37

## 2019-10-15 NOTE — ED PROVIDER NOTE - CARE PLAN
Principal Discharge DX:	Weakness Principal Discharge DX:	Impaired ambulation  Secondary Diagnosis:	Generalized weakness

## 2019-10-15 NOTE — ED PROVIDER NOTE - PHYSICAL EXAMINATION
" LOS: 6 days     Name: Galdino Dallas  Age: 33 y.o.  Sex: male  :  1985  MRN: [id number]         Primary Care Physician: Sherry Nava APRN    Subjective   Subjective  Had a mild headache this morning that improved after he took a nap.  No new complaints other than that.  No overnight events.  Denies chest pain or shortness of breath.    Objective   Vital Signs  Temp:  [97.7 °F (36.5 °C)-99 °F (37.2 °C)] 97.7 °F (36.5 °C)  Heart Rate:  [63-77] 65  Resp:  [16-18] 16  BP: (123-173)/() 123/72  Body mass index is 28.57 kg/m².    Objective:  General Appearance:  Comfortable and in no acute distress.    Vital signs: (most recent): Blood pressure 123/72, pulse 65, temperature 97.7 °F (36.5 °C), temperature source Oral, resp. rate 16, height 172.7 cm (68\"), weight 85.2 kg (187 lb 14.4 oz), SpO2 90 %.    Lungs:  Normal effort and normal respiratory rate.    Heart: Normal rate.  Regular rhythm.  (Mechanical valve click)  Abdomen: Abdomen is soft.  Bowel sounds are normal.   There is no abdominal tenderness.     Extremities: There is no dependent edema or local swelling.    Neurological: Patient is alert and oriented to person, place and time.    Skin:  Warm and dry.              Results Review:       I reviewed the patient's new clinical results.    Results from last 7 days   Lab Units 19  0516 19  0508 19  0357 19  0511 19  0643 19  2318 19  0530   WBC 10*3/mm3 6.70 8.26 7.35 8.04 8.16 8.02 7.91   HEMOGLOBIN g/dL 15.1 15.8 15.1 15.8 16.7 16.3 15.8   PLATELETS 10*3/mm3 142 143 154 152 162 170 180     Results from last 7 days   Lab Units 19  0508 19  0530 19  2139   SODIUM mmol/L 134* 134* 133*   POTASSIUM mmol/L 3.9 4.3 4.5   CHLORIDE mmol/L 101 98 97*   CO2 mmol/L 17.3* 22.5 25.0   BUN mg/dL 10 10 10   CREATININE mg/dL 0.66* 0.82 0.83   CALCIUM mg/dL 9.4 9.0 9.5   GLUCOSE mg/dL 75 99 97     Results from last 7 days   Lab Units 19  0516 " 08/31/19  0508 08/30/19  0357 08/29/19  0511 08/28/19  0643 08/27/19  2318 08/27/19  0854   INR  1.46* 1.20* 1.17* 1.40* 1.34* 1.31* 1.49*             Scheduled Meds:     atorvastatin 20 mg Oral Nightly   carvedilol 12.5 mg Oral BID With Meals   escitalopram 20 mg Oral Daily   lisinopril 10 mg Oral Q12H   pantoprazole 40 mg Oral Q AM   warfarin 10 mg Oral Once     PRN Meds:   •  acetaminophen **OR** acetaminophen  •  ALPRAZolam  •  bisacodyl  •  calcium carbonate  •  HYDROcodone-acetaminophen  •  labetalol  •  nitroglycerin  •  ondansetron  •  Pharmacy Consult - Pharmacy to dose  Continuous Infusions:    heparin (porcine) 12 Units/kg/hr Last Rate: 14 Units/kg/hr (09/01/19 0139)   Pharmacy Consult - Pharmacy to dose         Assessment/Plan   Active Hospital Problems    Diagnosis  POA   • **Acute cerebrovascular accident (CVA) (CMS/[facility]) [I63.9]  Yes   • Migraine headache [G43.909]  Yes   • Anticoagulant long-term use [Z79.01]  Not Applicable   • Coronary artery disease of native artery of native heart with stable angina pectoris (CMS/[facility]) [I25.118]  Yes   • S/P AVR [Z95.2]  Not Applicable   • Essential hypertension [I10]  Yes      Resolved Hospital Problems    Diagnosis Date Resolved POA   • Arm paresthesia, left [R20.2] 08/31/2019 Yes   • Hypertensive emergency [I16.1] 08/31/2019 Yes       Assessment & Plan    Acute CVA  - right PICA, likely cardioembolic from mechanical aortic valve and subtherapeutic INR  - on heparin gtt and bridging to Coumadin.  INR 1.4 today  -Appreciate Neuro. Will see if I can find out from neurology if they would be okay with Lovenox at home as opposed to staying here on a heparin drip until INR is therapeutic     Headache  -Improved     HTN  -Blood pressure medications have been titrated up and now under much better control.     CAD  -Stable     S/p AVR  - INR sub-therapeutic on admission  -On anticoagulation and bridging to Coumadin  - could possibly benefit from home INR monitoring, per  cardiology        VTE Prophylaxis - anticoagulated with heparin drip   Code Status - Full code  Disposition -home soon    Nnamdi Franco MD  [facility]  09/01/19  11:43 AM    Addendum: Discussed anticoagulation with neurology.  They are okay with transition to Lovenox.  Will order this for 9:00 this evening.  Patient feels a little uneasy about going home today and would prefer to start the Lovenox and then see what his INR looks like tomorrow.  Could possibly be discharged tomorrow with close outpatient INR monitoring.     looks chronically sick   (+) perioral anesthesia - unchanged as per pt, residual from previous stroke;

## 2019-10-15 NOTE — H&P ADULT - ASSESSMENT
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    CAPRINI SCORE [CLOT]    AGE RELATED RISK FACTORS                                                       MOBILITY RELATED FACTORS  [x ] Age 41-60 years                                            (1 Point)                  [ ] Bed rest                                                        (1 Point)  [  ] Age: 61-74 years                                           (2 Points)                 [ ] Plaster cast                                                   (2 Points)  [ ] Age= 75 years                                              (3 Points)                 [ ] Bed bound for more than 72 hours                 (2 Points)    DISEASE RELATED RISK FACTORS                                               GENDER SPECIFIC FACTORS  [ ] Edema in the lower extremities                       (1 Point)                  [ ] Pregnancy                                                     (1 Point)  [ ] Varicose veins                                               (1 Point)                  [ ] Post-partum < 6 weeks                                   (1 Point)             [ ] BMI > 25 Kg/m2                                            (1 Point)                  [ ] Hormonal therapy  or oral contraception          (1 Point)                 [ ] Sepsis (in the previous month)                        (1 Point)                  [ ] History of pregnancy complications                 (1 point)  [ ] Pneumonia or serious lung disease                                               [ ] Unexplained or recurrent                     (1 Point)           (in the previous month)                               (1 Point)  [ ] Abnormal pulmonary function test                     (1 Point)                 SURGERY RELATED RISK FACTORS  [ ] Acute myocardial infarction                              (1 Point)                 [ ]  Section                                             (1 Point)  [ ] Congestive heart failure (in the previous month)  (1 Point)               [ ] Minor surgery                                                  (1 Point)   [ ] Inflammatory bowel disease                             (1 Point)                 [ ] Arthroscopic surgery                                        (2 Points)  [ ] Central venous access                                      (2 Points)                [ ] General surgery lasting more than 45 minutes   (2 Points)       [x ] Stroke (in the previous month)                          (5 Points)               [ ] Elective arthroplasty                                         (5 Points)                                                                                                                                               HEMATOLOGY RELATED FACTORS                                                 TRAUMA RELATED RISK FACTORS  [ ] Prior episodes of VTE                                     (3 Points)                 [ ] Fracture of the hip, pelvis, or leg                       (5 Points)  [ ] Positive family history for VTE                         (3 Points)                 [ ] Acute spinal cord injury (in the previous month)  (5 Points)  [ ] Prothrombin 01084 A                                     (3 Points)                 [ ] Paralysis  (less than 1 month)                             (5 Points)  [ ] Factor V Leiden                                             (3 Points)                  [ ] Multiple Trauma within 1 month                        (5 Points)  [ ] Lupus anticoagulants                                     (3 Points)                                                           [ ] Anticardiolipin antibodies                               (3 Points)                                                       [ ] High homocysteine in the blood                      (3 Points)                                             [ ] Other congenital or acquired thrombophilia      (3 Points)                                                [ ] Heparin induced thrombocytopenia                  (3 Points)                                          Total Score [       6   ]

## 2019-10-15 NOTE — H&P ADULT - PROBLEM SELECTOR PLAN 1
rule out new onset cva, neurology consult, meclizine prn for dizziness, orthostatics, initial cth negative, continue asa and plavix

## 2019-10-15 NOTE — H&P ADULT - HISTORY OF PRESENT ILLNESS
59 female with pmh glaucoma, htn, dm, ckd, s/p discharge from [facility] on 10/12/19 secondary to left thalamic infarct with residual right sided weakness.  Pt presents today to ED complaining of dizziness and weakness for the past 2-3 days.  Pt reports being discharged on lantus and taking it for the first time on two nights ago.  Reports waking up the next morning with some dizziness, weakness, also with nausea and vomiting which has since resolved.  Pt reports not taking lantus the next night but woke up this morning with persistent dizziness and weakness.  Pt also reports right sided facial and hand numbness and paresthesias.  Denies abdominal pain, chest pain, sob, fever, diarrhea, headache, visual changes.

## 2019-10-15 NOTE — H&P ADULT - NSHPPHYSICALEXAM_GEN_ALL_CORE
T(C): 36.6 (10-15-19 @ 15:20), Max: 36.6 (10-15-19 @ 15:20)  HR: 82 (10-15-19 @ 17:41) (82 - 93)  BP: 158/84 (10-15-19 @ 17:41) (158/84 - 176/100)  RR: 18 (10-15-19 @ 17:41) (17 - 20)  SpO2: 100% (10-15-19 @ 17:41) (98% - 100%)  Wt(kg): --Vital Signs Last 24 Hrs  T(C): 36.6 (15 Oct 2019 15:20), Max: 36.6 (15 Oct 2019 15:20)  T(F): 97.9 (15 Oct 2019 15:20), Max: 97.9 (15 Oct 2019 15:20)  HR: 82 (15 Oct 2019 17:41) (82 - 93)  BP: 158/84 (15 Oct 2019 17:41) (158/84 - 176/100)  BP(mean): --  RR: 18 (15 Oct 2019 17:41) (17 - 20)  SpO2: 100% (15 Oct 2019 17:41) (98% - 100%)    PHYSICAL EXAM:  GENERAL: NAD, well-groomed, well-developed  HEAD:  Atraumatic, Normocephalic  EYES: EOMI, PERRLA, conjunctiva and sclera clear, legally blind right eye  ENMT: No tonsillar erythema, exudates, or enlargement; Moist mucous membranes, Good dentition, No lesions  NECK: Supple, No JVD, Normal thyroid  NERVOUS SYSTEM:  Alert & Oriented X3, Good concentration; Right sided residual weakness  CHEST/LUNG: Clear to percussion bilaterally; No rales, rhonchi, wheezing, or rubs  HEART: Regular rate and rhythm; No murmurs, rubs, or gallops  ABDOMEN: Soft, Nontender, Nondistended; Bowel sounds present  EXTREMITIES:  2+ Peripheral Pulses, No clubbing, cyanosis, or edema  LYMPH: No lymphadenopathy noted  SKIN: No rashes or lesions

## 2019-10-15 NOTE — H&P ADULT - ATTENDING COMMENTS
I have personally seen and examined patient on the above date.  I discussed the case with AJAY Escoto and I agree with findings and plan as detailed per note above, which I have amended where appropriate.  59 female with pmhx htn, dm2 , glaucoma, right eye blindness, hearing loss (has hearing aids), recently discharged from [facility] secondary to left thalamic infarct with residual right sided weakness presents with dizziness, weakness and numbness.    #Dizziness/weakness  check orthostatics  CTH reviewed : no new changes  Neurology evaluation : follow up recs if further test is needed  PT/Rehab evaluation  check serial trops/ecg. monitor events on tele  cont asa/lipitor    #HTN uncontrolled on presentation  low dose bb added    #DM2  basal/bolus/correctional regimen  monitor FS

## 2019-10-15 NOTE — H&P ADULT - NSHPREVIEWOFSYSTEMS_GEN_ALL_CORE
REVIEW OF SYSTEMS:  CONSTITUTIONAL: No fever, weight loss, or fatigue  EYES: No eye pain, visual disturbances, or discharge  ENMT:  No difficulty hearing, tinnitus, vertigo; No sinus or throat pain  NECK: No pain or stiffness  BREASTS: No pain, masses, or nipple discharge  RESPIRATORY: No cough, wheezing, chills or hemoptysis; No shortness of breath  CARDIOVASCULAR: No chest pain, palpitations, + dizziness, or leg swelling  GASTROINTESTINAL: No abdominal or epigastric pain. No nausea, vomiting, or hematemesis; No diarrhea or constipation. No melena or hematochezia.  GENITOURINARY: No dysuria, frequency, hematuria, or incontinence  NEUROLOGICAL: No headaches, memory loss, loss of strength, + numbness, +right sided residual weakness  SKIN: No itching, burning, rashes, or lesions   LYMPH NODES: No enlarged glands  ENDOCRINE: No heat or cold intolerance; No hair loss  MUSCULOSKELETAL: No joint pain or swelling; No muscle, back, or extremity pain  PSYCHIATRIC: No depression, anxiety, mood swings, or difficulty sleeping  HEME/LYMPH: No easy bruising, or bleeding gums  ALLERY AND IMMUNOLOGIC: No hives or eczema    ALL ROS REVIEWED AND NORMAL EXCEPT AS STATED ABOVE

## 2019-10-15 NOTE — ED PROVIDER NOTE - CLINICAL SUMMARY MEDICAL DECISION MAKING FREE TEXT BOX
Pt is 58 y/o female with PMhx of  glaucoma, HTN, DM, CKD, s/p CVA (with Rt sided residual) here for eval of dizziness, generalized weakness as well as nausea and vomiting x 3 days. Pt was recently discharged from rehab where she stayed after suffering from Lt thalamic infarct, was discharged home on Sat, started experiencing nausea and vomiting on Sun am - pt has had about 2-3 episodes of NB/NB emesis daily with the last episode pta, she attributes the sx to Lantus which was a new regimen started upon last admission (she only used lantus once on Sunday"; also with generalized weakness and gait instability since discharge - unclear if the sx are worsening since the stroke or are new as pt is aggregated and poor historian. Pt denies HA, visual changes, denies abd pain, diarrhea, fever, chills or urinary sx; denies fall since discharge, takes Plavix;, on exam - looks chronically ill but non-toxic appearing, no new neuro deficits on the exam - will get ekg, CT head, labs, will reasses;

## 2019-10-15 NOTE — ED PROVIDER NOTE - ATTENDING CONTRIBUTION TO CARE
Pt is 60 y/o female with PMhx of  glaucoma, HTN, DM, CKD, s/p CVA (with Rt sided residual) here for eval of dizziness, generalized weakness as well as nausea and vomiting x 3 days. Pt was recently discharged from rehab where she stayed after suffering from Lt thalamic infarct, was discharged home on Sat, started experiencing nausea and vomiting on Sun am - pt has had about 2-3 episodes of NB/NB emesis daily with the last episode pta, she attributes the sx to Lantus which was a new regimen started upon last admission (she only used lantus once on Sunday"; also with generalized weakness and gait instability since discharge - unclear if the sx are worsening since the stroke or are new as pt is aggregated and poor historian. Pt denies HA, visual changes, denies abd pain, diarrhea, fever, chills or urinary sx; denies fall since discharge, takes Plavix;, on exam - looks chronically ill but non-toxic appearing, no new neuro deficits on the exam -get ekg, CT head neg   Dr. Green:  I have reviewed and discussed with the PA/ resident the case specifics, including the history, physical assessment, evaluation, conclusion, laboratory results, and medical plan. I agree with the contents, and conclusions. I have personally examined, and interviewed the patient.

## 2019-10-15 NOTE — ED ADULT NURSE NOTE - OBJECTIVE STATEMENT
59 yr old female ambulatory to ED with cane accompanied by son from home for evaluation of dizziness, vomiting and weakness. Pt reports "I've been vomiting since Sunday". Pt denies numbness, fevers, chills. chest pain, shortness of breath. Pt was recently d/c from Rehab for a CVA. PMHX: Glaucoma, CVA, CKD, DM, HTN.

## 2019-10-15 NOTE — H&P ADULT - NSHPLABSRESULTS_GEN_ALL_CORE
10.7   10.15 )-----------( 271      ( 15 Oct 2019 16:33 )             34.2       10-15    142  |  106  |  42  ----------------------------<  180  4.9   |  23  |  1.49    Ca    10.1      15 Oct 2019 16:33    TPro  7.9  /  Alb  3.2  /  TBili  0.4  /  DBili  x   /  AST  32  /  ALT  27  /  AlkPhos  100  10-15    PT/INR - ( 15 Oct 2019 16:33 )   PT: 10.1 sec;   INR: 0.90 ratio         PTT - ( 15 Oct 2019 16:33 )  PTT:28.2 sec  CARDIAC MARKERS ( 15 Oct 2019 16:33 )  <.017 ng/mL / x     / x     / x     / x          09-30 WavcyjswxrM7A 9.6  09-29 XqsrhpnxvfC8Z 9.4    09-30 Chol 384 mg/dL  mg/dL HDL 64 mg/dL Trig 251 mg/dL    < from: CT Head No Cont (10.15.19 @ 17:03) >    Impression:  1. Unremarkable noncontrast CT scan of the brain.                        ISAMAR GALLAGHER M.D., ATTENDING RADIOLOGIST  This document has been electronically signed. Oct 15 2019  5:13PM    < end of copied text >

## 2019-10-15 NOTE — ED ADULT NURSE REASSESSMENT NOTE - NS ED NURSE REASSESS COMMENT FT1
Care of pt received from Brit Sethi RN. Pt currently awaiting admission at this time. Pt aware as to plan of care and rationale. Will continue to monitor.

## 2019-10-15 NOTE — H&P ADULT - NSHPOUTPATIENTPROVIDERS_GEN_ALL_CORE
Gilda Foremaniott - Neurology, Vascular Neurology  Dr. Hernandez Bethel - Cardiology, Internal Medicine

## 2019-10-15 NOTE — ED PROVIDER NOTE - OBJECTIVE STATEMENT
Pt is 60 y/o female with PMhx of  HTN, DM, CKD, s/p CVA here for eval Pt is 60 y/o female with PMhx of  glaucoma, HTN, DM, CKD, s/p CVA (with Rt sided residual) here for eval of dizziness, generalized weakness as well as nausea and vomiting x 3 days. Pt was recently discharged from rehab where she stayed after suffering from Lt thalamic infarct, was discharged home on Sat, started experiencing nausea and vomiting on Sun am - pt has had about 2-3 episodes of NB/NB emesis daily with the last episode pta, she attributes the sx to Lantus which was a new regimen started upon last admission (she only used lantus once on Sunday"; also with generalized weakness and gait instability since discharge - unclear if the sx are worsening since the stroke or are new as pt is aggregated and poor historian. Pt denies HA, visual changes, denies abd pain, diarrhea, fever, chills or urinary sx; denies fall since discharge, take Plavix;

## 2019-10-16 DIAGNOSIS — N18.3 CHRONIC KIDNEY DISEASE, STAGE 3 (MODERATE): ICD-10-CM

## 2019-10-16 DIAGNOSIS — Z29.9 ENCOUNTER FOR PROPHYLACTIC MEASURES, UNSPECIFIED: ICD-10-CM

## 2019-10-16 LAB
ALBUMIN SERPL ELPH-MCNC: 2.6 G/DL — LOW (ref 3.3–5)
ALP SERPL-CCNC: 79 U/L — SIGNIFICANT CHANGE UP (ref 40–120)
ALT FLD-CCNC: 22 U/L — SIGNIFICANT CHANGE UP (ref 10–45)
ANION GAP SERPL CALC-SCNC: 8 MMOL/L — SIGNIFICANT CHANGE UP (ref 5–17)
AST SERPL-CCNC: 18 U/L — SIGNIFICANT CHANGE UP (ref 10–40)
BILIRUB SERPL-MCNC: 0.3 MG/DL — SIGNIFICANT CHANGE UP (ref 0.2–1.2)
BUN SERPL-MCNC: 40 MG/DL — HIGH (ref 7–23)
CALCIUM SERPL-MCNC: 9.1 MG/DL — SIGNIFICANT CHANGE UP (ref 8.4–10.5)
CHLORIDE SERPL-SCNC: 111 MMOL/L — HIGH (ref 96–108)
CHOLEST SERPL-MCNC: 204 MG/DL — HIGH (ref 10–199)
CO2 SERPL-SCNC: 25 MMOL/L — SIGNIFICANT CHANGE UP (ref 22–31)
CREAT SERPL-MCNC: 1.56 MG/DL — HIGH (ref 0.5–1.3)
GLUCOSE BLDC GLUCOMTR-MCNC: 132 MG/DL — HIGH (ref 70–99)
GLUCOSE BLDC GLUCOMTR-MCNC: 134 MG/DL — HIGH (ref 70–99)
GLUCOSE BLDC GLUCOMTR-MCNC: 147 MG/DL — HIGH (ref 70–99)
GLUCOSE BLDC GLUCOMTR-MCNC: 169 MG/DL — HIGH (ref 70–99)
GLUCOSE SERPL-MCNC: 145 MG/DL — HIGH (ref 70–99)
HBA1C BLD-MCNC: 8.7 % — HIGH (ref 4–5.6)
HCT VFR BLD CALC: 28.7 % — LOW (ref 34.5–45)
HDLC SERPL-MCNC: 48 MG/DL — LOW
HGB BLD-MCNC: 9 G/DL — LOW (ref 11.5–15.5)
LIPID PNL WITH DIRECT LDL SERPL: 126 MG/DL — HIGH
MCHC RBC-ENTMCNC: 26.2 PG — LOW (ref 27–34)
MCHC RBC-ENTMCNC: 31.4 GM/DL — LOW (ref 32–36)
MCV RBC AUTO: 83.7 FL — SIGNIFICANT CHANGE UP (ref 80–100)
NRBC # BLD: 0 /100 WBCS — SIGNIFICANT CHANGE UP (ref 0–0)
PLATELET # BLD AUTO: 254 K/UL — SIGNIFICANT CHANGE UP (ref 150–400)
POTASSIUM SERPL-MCNC: 4.4 MMOL/L — SIGNIFICANT CHANGE UP (ref 3.5–5.3)
POTASSIUM SERPL-SCNC: 4.4 MMOL/L — SIGNIFICANT CHANGE UP (ref 3.5–5.3)
PROT SERPL-MCNC: 6.4 G/DL — SIGNIFICANT CHANGE UP (ref 6–8.3)
RBC # BLD: 3.43 M/UL — LOW (ref 3.8–5.2)
RBC # FLD: 13.2 % — SIGNIFICANT CHANGE UP (ref 10.3–14.5)
SODIUM SERPL-SCNC: 144 MMOL/L — SIGNIFICANT CHANGE UP (ref 135–145)
TOTAL CHOLESTEROL/HDL RATIO MEASUREMENT: 4.3 RATIO — SIGNIFICANT CHANGE UP (ref 3.3–7.1)
TRIGL SERPL-MCNC: 152 MG/DL — HIGH (ref 10–149)
TROPONIN I SERPL-MCNC: <.017 NG/ML — LOW (ref 0.02–0.06)
WBC # BLD: 7.99 K/UL — SIGNIFICANT CHANGE UP (ref 3.8–10.5)
WBC # FLD AUTO: 7.99 K/UL — SIGNIFICANT CHANGE UP (ref 3.8–10.5)

## 2019-10-16 PROCEDURE — 99233 SBSQ HOSP IP/OBS HIGH 50: CPT | Mod: GC

## 2019-10-16 RX ADMIN — BRIMONIDINE TARTRATE 1 DROP(S): 2 SOLUTION/ DROPS OPHTHALMIC at 06:10

## 2019-10-16 RX ADMIN — Medication 81 MILLIGRAM(S): at 11:34

## 2019-10-16 RX ADMIN — LATANOPROST 1 DROP(S): 0.05 SOLUTION/ DROPS OPHTHALMIC; TOPICAL at 20:50

## 2019-10-16 RX ADMIN — Medication 650 MILLIGRAM(S): at 11:36

## 2019-10-16 RX ADMIN — ATORVASTATIN CALCIUM 80 MILLIGRAM(S): 80 TABLET, FILM COATED ORAL at 20:49

## 2019-10-16 RX ADMIN — Medication 12.5 MILLIGRAM(S): at 06:10

## 2019-10-16 RX ADMIN — ENOXAPARIN SODIUM 40 MILLIGRAM(S): 100 INJECTION SUBCUTANEOUS at 11:36

## 2019-10-16 RX ADMIN — Medication 2: at 11:50

## 2019-10-16 RX ADMIN — BRIMONIDINE TARTRATE 1 DROP(S): 2 SOLUTION/ DROPS OPHTHALMIC at 20:50

## 2019-10-16 RX ADMIN — CLOPIDOGREL BISULFATE 75 MILLIGRAM(S): 75 TABLET, FILM COATED ORAL at 11:35

## 2019-10-16 RX ADMIN — Medication 12.5 MILLIGRAM(S): at 18:15

## 2019-10-16 RX ADMIN — INSULIN GLARGINE 15 UNIT(S): 100 INJECTION, SOLUTION SUBCUTANEOUS at 20:50

## 2019-10-16 NOTE — PROGRESS NOTE ADULT - PROBLEM SELECTOR PROBLEM 2
Essential hypertension Type 2 diabetes mellitus with diabetic chronic kidney disease, unspecified CKD stage, unspecified whether long term insulin use

## 2019-10-16 NOTE — PROGRESS NOTE ADULT - PROBLEM SELECTOR PLAN 2
-continue Amlodipine, Metoprolol -BS over last 24 hrs. 134 >209; continue lantus insulin and ISS, HgbA1C: 8.7  -will have Diabetes Educator see patient; patient admits that she does not do finger-sticks at home prior to insulin dosing  -Creat 1.56; avoid nephrotoxins

## 2019-10-16 NOTE — PROGRESS NOTE ADULT - PROBLEM SELECTOR PLAN 3
-BS over last 24 hrs. 134 >209; continue lantus insulin and ISS, HgbA1C: 8.7  -will have Diabetes Educator see patient; patient admits that she does not do finger-sticks at home prior to insulin dosing  -Creat 1.56; avoid nephrotoxins pt at baseline Creat, continue to follow bmp; she was seen by Nephrology on last admission in Acute Rehab; no further recommendations

## 2019-10-16 NOTE — PROGRESS NOTE ADULT - PROBLEM SELECTOR PLAN 1
-No acute changes on CT of head 10/15; pt with all chronic sx (had chronic dizziness, right sided weakness and loss of sensation)  -continue ASA, Plavix, Statin  -Neurology consult pending -No acute changes on CT of head 10/15; pt with all chronic sx (had chronic dizziness, right sided weakness and loss of sensation)  -continue ASA, Plavix, Statin  -PT eval: recommendation is for home with home PT  -Neurology consult pending -Pt with hx of recent left thalamic infarct; discharged from Acute Rehab on 10/12  -No acute changes on CT of head 10/15; pt with all chronic sx (had chronic dizziness, right sided weakness and loss of sensation)  -continue ASA, Plavix, Statin  -PT eval: recommendation is for home with home PT  -Neurology consult pending

## 2019-10-16 NOTE — OCCUPATIONAL THERAPY INITIAL EVALUATION ADULT - ORIENTATION, REHAB EVAL
needs redirections as patient is tangental and easily distracted/oriented to person, place, time and situation

## 2019-10-16 NOTE — PROGRESS NOTE ADULT - SUBJECTIVE AND OBJECTIVE BOX
Patient is a 59y old  Female who presents with a chief complaint of Dizziness and Weakness (15 Oct 2019 18:27)  Patient seen and examined at bedside.  No events overnight.  Pt. c/o chronic dizziness, chronic numbness to right hand and right facial area.    ALLERGIES:  No Known Allergies    MEDICATIONS  (STANDING):  aspirin enteric coated 81 milliGRAM(s) Oral daily  atorvastatin 80 milliGRAM(s) Oral at bedtime  brimonidine 0.2% Ophthalmic Solution 1 Drop(s) Both EYES three times a day  clopidogrel Tablet 75 milliGRAM(s) Oral daily  dextrose 5%. 1000 milliLiter(s) (50 mL/Hr) IV Continuous <Continuous>  dextrose 50% Injectable 12.5 Gram(s) IV Push once  dextrose 50% Injectable 25 Gram(s) IV Push once  dextrose 50% Injectable 25 Gram(s) IV Push once  dorzolamide 2%/timolol 0.5% Ophthalmic Solution 1 Drop(s) Both EYES <User Schedule>  enoxaparin Injectable 40 milliGRAM(s) SubCutaneous daily  insulin glargine Injectable (LANTUS) 15 Unit(s) SubCutaneous at bedtime  insulin lispro (HumaLOG) corrective regimen sliding scale   SubCutaneous three times a day before meals  insulin lispro (HumaLOG) corrective regimen sliding scale   SubCutaneous at bedtime  latanoprost 0.005% Ophthalmic Solution 1 Drop(s) Right EYE at bedtime  metoprolol tartrate 12.5 milliGRAM(s) Oral two times a day  sodium bicarbonate 650 milliGRAM(s) Oral daily    MEDICATIONS  (PRN):  dextrose 40% Gel 15 Gram(s) Oral once PRN Blood Glucose LESS THAN 70 milliGRAM(s)/deciliter  glucagon  Injectable 1 milliGRAM(s) IntraMuscular once PRN Glucose LESS THAN 70 milligrams/deciliter  meclizine 25 milliGRAM(s) Oral every 8 hours PRN Dizziness    Vital Signs Last 24 Hrs  T(F): 98.2 (16 Oct 2019 06:07), Max: 98.2 (15 Oct 2019 21:21)  HR: 80 (16 Oct 2019 09:34) (79 - 93)  BP: 132/62 (16 Oct 2019 09:34) (132/62 - 176/100)  RR: 15 (16 Oct 2019 06:07) (15 - 20)  SpO2: 98% (16 Oct 2019 06:07) (98% - 100%)  I&O's Summary    16 Oct 2019 07:01  -  16 Oct 2019 11:22  --------------------------------------------------------  IN: 120 mL / OUT: 0 mL / NET: 120 mL      PHYSICAL EXAM:  General: NAD, A/O x 3  ENT: MMM  Neck: Supple, No JVD  Lungs: good air entry, Clear to auscultation bilaterally  Cardio: RRR, S1/S2, No murmurs  Abdomen: Soft, Nontender, Nondistended; Bowel sounds present  Extremities: No calf tenderness, No pitting edema  Neuro:  right sided weakness with decreased sensation, speech slightly slurred    LABS:                        9.0    7.99  )-----------( 254      ( 16 Oct 2019 07:12 )             28.7     10-16    144  |  111  |  40  ----------------------------<  145  4.4   |  25  |  1.56    Ca    9.1      16 Oct 2019 07:12    TPro  6.4  /  Alb  2.6  /  TBili  0.3  /  DBili  x   /  AST  18  /  ALT  22  /  AlkPhos  79  10-16    eGFR if Non African American: 36 mL/min/1.73M2 (10-16-19 @ 07:12)  eGFR if African American: 41 mL/min/1.73M2 (10-16-19 @ 07:12)    PT/INR - ( 15 Oct 2019 16:33 )   PT: 10.1 sec;   INR: 0.90 ratio         PTT - ( 15 Oct 2019 16:33 )  PTT:28.2 sec    CARDIAC MARKERS ( 16 Oct 2019 07:12 )  <.017 ng/mL / x     / x     / x     / x      CARDIAC MARKERS ( 15 Oct 2019 16:33 )  <.017 ng/mL / x     / x     / x     / x          10-16 Chol 204 mg/dL  mg/dL HDL 48 mg/dL Trig 152 mg/dL      POCT Blood Glucose.: 134 mg/dL (16 Oct 2019 07:47)  POCT Blood Glucose.: 172 mg/dL (15 Oct 2019 22:33)  POCT Blood Glucose.: 209 mg/dL (15 Oct 2019 19:25)  POCT Blood Glucose.: 174 mg/dL (15 Oct 2019 15:42)    10-15 ClhxeputkjK2H 8.7   AmdzeqltmjK0V 9.6   IvwggawehaR7A 9.4    Urinalysis Basic - ( 15 Oct 2019 21:35 )    Color: Yellow / Appearance: Clear / S.020 / pH: x  Gluc: x / Ketone: Small  / Bili: Negative / Urobili: Negative   Blood: x / Protein: 500 mg/dL / Nitrite: Negative   Leuk Esterase: Small / RBC: 0-4 /HPF / WBC 6-10 /HPF   Sq Epi: x / Non Sq Epi: Moderate / Bacteria: Few /HPF          RADIOLOGY & ADDITIONAL TESTS:    Care Discussed with Consultants/Other Providers: Patient is a 59y old  Female who presents with a chief complaint of Dizziness and Weakness (15 Oct 2019 18:27)  Patient seen and examined at bedside.  No events overnight.  Pt. c/o chronic dizziness, chronic numbness to right hand and right facial area.    ALLERGIES:  No Known Allergies    MEDICATIONS  (STANDING):  aspirin enteric coated 81 milliGRAM(s) Oral daily  atorvastatin 80 milliGRAM(s) Oral at bedtime  brimonidine 0.2% Ophthalmic Solution 1 Drop(s) Both EYES three times a day  clopidogrel Tablet 75 milliGRAM(s) Oral daily  dextrose 5%. 1000 milliLiter(s) (50 mL/Hr) IV Continuous <Continuous>  dextrose 50% Injectable 12.5 Gram(s) IV Push once  dextrose 50% Injectable 25 Gram(s) IV Push once  dextrose 50% Injectable 25 Gram(s) IV Push once  dorzolamide 2%/timolol 0.5% Ophthalmic Solution 1 Drop(s) Both EYES <User Schedule>  enoxaparin Injectable 40 milliGRAM(s) SubCutaneous daily  insulin glargine Injectable (LANTUS) 15 Unit(s) SubCutaneous at bedtime  insulin lispro (HumaLOG) corrective regimen sliding scale   SubCutaneous three times a day before meals  insulin lispro (HumaLOG) corrective regimen sliding scale   SubCutaneous at bedtime  latanoprost 0.005% Ophthalmic Solution 1 Drop(s) Right EYE at bedtime  metoprolol tartrate 12.5 milliGRAM(s) Oral two times a day  sodium bicarbonate 650 milliGRAM(s) Oral daily    MEDICATIONS  (PRN):  dextrose 40% Gel 15 Gram(s) Oral once PRN Blood Glucose LESS THAN 70 milliGRAM(s)/deciliter  glucagon  Injectable 1 milliGRAM(s) IntraMuscular once PRN Glucose LESS THAN 70 milligrams/deciliter  meclizine 25 milliGRAM(s) Oral every 8 hours PRN Dizziness    Vital Signs Last 24 Hrs  T(F): 98.2 (16 Oct 2019 06:07), Max: 98.2 (15 Oct 2019 21:21)  HR: 80 (16 Oct 2019 09:34) (79 - 93)  BP: 132/62 (16 Oct 2019 09:34) (132/62 - 176/100)  RR: 15 (16 Oct 2019 06:07) (15 - 20)  SpO2: 98% (16 Oct 2019 06:07) (98% - 100%)  I&O's Summary    16 Oct 2019 07:01  -  16 Oct 2019 11:22  --------------------------------------------------------  IN: 120 mL / OUT: 0 mL / NET: 120 mL      PHYSICAL EXAM:  General: NAD, A/O x 3  ENT: MMM  Neck: Supple, No JVD  Lungs: good air entry, Clear to auscultation bilaterally  Cardio: RRR, S1/S2, No murmurs  Abdomen: Soft, Nontender, Nondistended; Bowel sounds present  Extremities: No calf tenderness, No pitting edema  Neuro:  right sided weakness with decreased sensation, speech slightly slurred    LABS:                        9.0    7.99  )-----------( 254      ( 16 Oct 2019 07:12 )             28.7     10-16    144  |  111  |  40  ----------------------------<  145  4.4   |  25  |  1.56    Ca    9.1      16 Oct 2019 07:12    TPro  6.4  /  Alb  2.6  /  TBili  0.3  /  DBili  x   /  AST  18  /  ALT  22  /  AlkPhos  79  10-16    eGFR if Non African American: 36 mL/min/1.73M2 (10-16-19 @ 07:12)  eGFR if African American: 41 mL/min/1.73M2 (10-16-19 @ 07:12)    PT/INR - ( 15 Oct 2019 16:33 )   PT: 10.1 sec;   INR: 0.90 ratio         PTT - ( 15 Oct 2019 16:33 )  PTT:28.2 sec    CARDIAC MARKERS ( 16 Oct 2019 07:12 )  <.017 ng/mL / x     / x     / x     / x      CARDIAC MARKERS ( 15 Oct 2019 16:33 )  <.017 ng/mL / x     / x     / x     / x          10-16 Chol 204 mg/dL  mg/dL HDL 48 mg/dL Trig 152 mg/dL      POCT Blood Glucose.: 134 mg/dL (16 Oct 2019 07:47)  POCT Blood Glucose.: 172 mg/dL (15 Oct 2019 22:33)  POCT Blood Glucose.: 209 mg/dL (15 Oct 2019 19:25)  POCT Blood Glucose.: 174 mg/dL (15 Oct 2019 15:42)    10-15 ZpsipqztsbU9K 8.7   VrhsgimsgeW9B 9.6   YyutqrvqgjF5Q 9.4    Urinalysis Basic - ( 15 Oct 2019 21:35 )    Color: Yellow / Appearance: Clear / S.020 / pH: x  Gluc: x / Ketone: Small  / Bili: Negative / Urobili: Negative   Blood: x / Protein: 500 mg/dL / Nitrite: Negative   Leuk Esterase: Small / RBC: 0-4 /HPF / WBC 6-10 /HPF   Sq Epi: x / Non Sq Epi: Moderate / Bacteria: Few /HPF          RADIOLOGY & ADDITIONAL TESTS:  < from: CT Head No Cont (10.15. @ 17:03) >    Impression:  1. Unremarkable noncontrast CT scan of the brain.      < end of copied text >    Care Discussed with Consultants/Other Providers: Patient is a 59y old  Female who presents with a chief complaint of Dizziness and Weakness (15 Oct 2019 18:27)  Patient seen and examined at bedside.  No events overnight.  Pt c/o chronic dizziness, chronic numbness to right hand and right facial area.    ALLERGIES:  No Known Allergies    MEDICATIONS  (STANDING):  aspirin enteric coated 81 milliGRAM(s) Oral daily  atorvastatin 80 milliGRAM(s) Oral at bedtime  brimonidine 0.2% Ophthalmic Solution 1 Drop(s) Both EYES three times a day  clopidogrel Tablet 75 milliGRAM(s) Oral daily  dextrose 5%. 1000 milliLiter(s) (50 mL/Hr) IV Continuous <Continuous>  dextrose 50% Injectable 12.5 Gram(s) IV Push once  dextrose 50% Injectable 25 Gram(s) IV Push once  dextrose 50% Injectable 25 Gram(s) IV Push once  dorzolamide 2%/timolol 0.5% Ophthalmic Solution 1 Drop(s) Both EYES <User Schedule>  enoxaparin Injectable 40 milliGRAM(s) SubCutaneous daily  insulin glargine Injectable (LANTUS) 15 Unit(s) SubCutaneous at bedtime  insulin lispro (HumaLOG) corrective regimen sliding scale   SubCutaneous three times a day before meals  insulin lispro (HumaLOG) corrective regimen sliding scale   SubCutaneous at bedtime  latanoprost 0.005% Ophthalmic Solution 1 Drop(s) Right EYE at bedtime  metoprolol tartrate 12.5 milliGRAM(s) Oral two times a day  sodium bicarbonate 650 milliGRAM(s) Oral daily    MEDICATIONS  (PRN):  dextrose 40% Gel 15 Gram(s) Oral once PRN Blood Glucose LESS THAN 70 milliGRAM(s)/deciliter  glucagon  Injectable 1 milliGRAM(s) IntraMuscular once PRN Glucose LESS THAN 70 milligrams/deciliter  meclizine 25 milliGRAM(s) Oral every 8 hours PRN Dizziness    Vital Signs Last 24 Hrs  T(F): 98.2 (16 Oct 2019 06:07), Max: 98.2 (15 Oct 2019 21:21)  HR: 80 (16 Oct 2019 09:34) (79 - 93)  BP: 132/62 (16 Oct 2019 09:34) (132/62 - 176/100)  RR: 15 (16 Oct 2019 06:07) (15 - 20)  SpO2: 98% (16 Oct 2019 06:07) (98% - 100%)  I&O's Summary    16 Oct 2019 07:01  -  16 Oct 2019 11:22  --------------------------------------------------------  IN: 120 mL / OUT: 0 mL / NET: 120 mL      PHYSICAL EXAM:  General: NAD, A/O x 3  ENT: MMM  Neck: Supple, No JVD  Lungs: good air entry, Clear to auscultation bilaterally  Cardio: RRR, S1/S2, No murmurs  Abdomen: Soft, Nontender, Nondistended; Bowel sounds present  Extremities: No calf tenderness, No pitting edema  Neuro:  right sided weakness with decreased sensation, speech slightly slurred    LABS:                        9.0    7.99  )-----------( 254      ( 16 Oct 2019 07:12 )             28.7     10-16    144  |  111  |  40  ----------------------------<  145  4.4   |  25  |  1.56    Ca    9.1      16 Oct 2019 07:12    TPro  6.4  /  Alb  2.6  /  TBili  0.3  /  DBili  x   /  AST  18  /  ALT  22  /  AlkPhos  79  10-16    eGFR if Non African American: 36 mL/min/1.73M2 (10-16-19 @ 07:12)  eGFR if African American: 41 mL/min/1.73M2 (10-16-19 @ 07:12)    PT/INR - ( 15 Oct 2019 16:33 )   PT: 10.1 sec;   INR: 0.90 ratio         PTT - ( 15 Oct 2019 16:33 )  PTT:28.2 sec    CARDIAC MARKERS ( 16 Oct 2019 07:12 )  <.017 ng/mL / x     / x     / x     / x      CARDIAC MARKERS ( 15 Oct 2019 16:33 )  <.017 ng/mL / x     / x     / x     / x          10-16 Chol 204 mg/dL  mg/dL HDL 48 mg/dL Trig 152 mg/dL      POCT Blood Glucose.: 134 mg/dL (16 Oct 2019 07:47)  POCT Blood Glucose.: 172 mg/dL (15 Oct 2019 22:33)  POCT Blood Glucose.: 209 mg/dL (15 Oct 2019 19:25)  POCT Blood Glucose.: 174 mg/dL (15 Oct 2019 15:42)    10-15 LzctxvtvpxM3V 8.7   IehxsubpilT2I 9.6   LrgpdieoudA8D 9.4    Urinalysis Basic - ( 15 Oct 2019 21:35 )    Color: Yellow / Appearance: Clear / S.020 / pH: x  Gluc: x / Ketone: Small  / Bili: Negative / Urobili: Negative   Blood: x / Protein: 500 mg/dL / Nitrite: Negative   Leuk Esterase: Small / RBC: 0-4 /HPF / WBC 6-10 /HPF   Sq Epi: x / Non Sq Epi: Moderate / Bacteria: Few /HPF          RADIOLOGY & ADDITIONAL TESTS:  < from: CT Head No Cont (10.15.19 @ 17:03) >    Impression:  1. Unremarkable noncontrast CT scan of the brain.      < end of copied text >    Care Discussed with Consultants/Other Providers:

## 2019-10-16 NOTE — PHYSICAL THERAPY INITIAL EVALUATION ADULT - PERTINENT HX OF CURRENT PROBLEM, REHAB EVAL
patient was D/C home from acute rehab on [facility] on 10/12 with home care services, she was experiencing vomiting/nausea and dizziness at home

## 2019-10-16 NOTE — PROGRESS NOTE ADULT - PROBLEM SELECTOR PROBLEM 3
Type 2 diabetes mellitus with diabetic chronic kidney disease, unspecified CKD stage, unspecified whether long term insulin use Stage 3 chronic kidney disease

## 2019-10-16 NOTE — OCCUPATIONAL THERAPY INITIAL EVALUATION ADULT - ADDITIONAL COMMENTS
Patient was Independent with all ADL's and IADL's prior to admission to [facility]. Since patient was discharged patient was not as mobile and had assistance from son for ADLs' and IADL's

## 2019-10-16 NOTE — OCCUPATIONAL THERAPY INITIAL EVALUATION ADULT - PERTINENT HX OF CURRENT PROBLEM, REHAB EVAL
Patient recently d/c from [facility] on 10/12, complaints of dizziness and weakness with new right eye pressure

## 2019-10-16 NOTE — CONSULT NOTE ADULT - ASSESSMENT
60 yo female with PDM HTN HLD blind OD s/p recent left thalamic infarct and bilateral PCA disease presents with 2 days of dizziness but no cephalgia or new  focal complaints.      REC:  continue vascular risk factor reduction as you are doing and patient is maximally treated at this time, diabetic control, neuro f/u as outpatient.

## 2019-10-16 NOTE — PROGRESS NOTE ADULT - ASSESSMENT
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      CAPRINI SCORE [CLOT]    AGE RELATED RISK FACTORS                                                       MOBILITY RELATED FACTORS  [x] Age 41-60 years                                            (1 Point)                  [ ] Bed rest                                                        (1 Point)  [ ] Age: 61-74 years                                           (2 Points)                 [ ] Plaster cast                                                   (2 Points)  [ ] Age= 75 years                                              (3 Points)                 [ ] Bed bound for more than 72 hours                 (2 Points)    DISEASE RELATED RISK FACTORS                                               GENDER SPECIFIC FACTORS  [ ] Edema in the lower extremities                       (1 Point)                  [ ] Pregnancy                                                     (1 Point)  [ ] Varicose veins                                               (1 Point)                  [ ] Post-partum < 6 weeks                                   (1 Point)             [ ] BMI > 25 Kg/m2                                            (1 Point)                  [ ] Hormonal therapy  or oral contraception          (1 Point)                 [ ] Sepsis (in the previous month)                        (1 Point)                  [ ] History of pregnancy complications                 (1 point)  [ ] Pneumonia or serious lung disease                                               [ ] Unexplained or recurrent                     (1 Point)           (in the previous month)                               (1 Point)  [ ] Abnormal pulmonary function test                     (1 Point)                 SURGERY RELATED RISK FACTORS  [ ] Acute myocardial infarction                              (1 Point)                 [ ]  Section                                             (1 Point)  [ ] Congestive heart failure (in the previous month)  (1 Point)               [ ] Minor surgery                                                  (1 Point)   [ ] Inflammatory bowel disease                             (1 Point)                 [ ] Arthroscopic surgery                                        (2 Points)  [ ] Central venous access                                      (2 Points)                [ ] General surgery lasting more than 45 minutes   (2 Points)       [x] Stroke (in the previous month)                          (5 Points)               [ ] Elective arthroplasty                                         (5 Points)                                                                                                                                               HEMATOLOGY RELATED FACTORS                                                 TRAUMA RELATED RISK FACTORS  [ ] Prior episodes of VTE                                     (3 Points)                [ ] Fracture of the hip, pelvis, or leg                       (5 Points)  [ ] Positive family history for VTE                         (3 Points)                 [ ] Acute spinal cord injury (in the previous month)  (5 Points)  [ ] Prothrombin 62790 A                                     (3 Points)                 [ ] Paralysis  (less than 1 month)                             (5 Points)  [ ] Factor V Leiden                                             (3 Points)                  [ ] Multiple Trauma within 1 month                        (5 Points)  [ ] Lupus anticoagulants                                     (3 Points)                                                           [ ] Anticardiolipin antibodies                               (3 Points)                                                       [ ] High homocysteine in the blood                      (3 Points)                                             [ ] Other congenital or acquired thrombophilia      (3 Points)                                                [ ] Heparin induced thrombocytopenia                  (3 Points)                                          Total Score [   6       ]    Caprini Score 0 - 2:  Low Risk, No VTE Prophylaxis required for most patients, encourage ambulation  Caprini Score 3 - 6:  At Risk, pharmacologic VTE prophylaxis is indicated for most patients (in the absence of a contraindication)  Caprini Score Greater than or = 7:  High Risk, pharmacologic VTE prophylaxis is indicated for most patients (in the absence of a contraindication)

## 2019-10-17 VITALS
OXYGEN SATURATION: 100 % | SYSTOLIC BLOOD PRESSURE: 118 MMHG | DIASTOLIC BLOOD PRESSURE: 68 MMHG | HEART RATE: 68 BPM | RESPIRATION RATE: 14 BRPM | TEMPERATURE: 98 F

## 2019-10-17 LAB
ANION GAP SERPL CALC-SCNC: 10 MMOL/L — SIGNIFICANT CHANGE UP (ref 5–17)
BUN SERPL-MCNC: 46 MG/DL — HIGH (ref 7–23)
CALCIUM SERPL-MCNC: 9.4 MG/DL — SIGNIFICANT CHANGE UP (ref 8.4–10.5)
CHLORIDE SERPL-SCNC: 109 MMOL/L — HIGH (ref 96–108)
CO2 SERPL-SCNC: 23 MMOL/L — SIGNIFICANT CHANGE UP (ref 22–31)
CREAT SERPL-MCNC: 1.57 MG/DL — HIGH (ref 0.5–1.3)
GLUCOSE BLDC GLUCOMTR-MCNC: 110 MG/DL — HIGH (ref 70–99)
GLUCOSE BLDC GLUCOMTR-MCNC: 156 MG/DL — HIGH (ref 70–99)
GLUCOSE BLDC GLUCOMTR-MCNC: 201 MG/DL — HIGH (ref 70–99)
GLUCOSE SERPL-MCNC: 160 MG/DL — HIGH (ref 70–99)
HCT VFR BLD CALC: 30.5 % — LOW (ref 34.5–45)
HGB BLD-MCNC: 9.7 G/DL — LOW (ref 11.5–15.5)
MCHC RBC-ENTMCNC: 26.5 PG — LOW (ref 27–34)
MCHC RBC-ENTMCNC: 31.8 GM/DL — LOW (ref 32–36)
MCV RBC AUTO: 83.3 FL — SIGNIFICANT CHANGE UP (ref 80–100)
NRBC # BLD: 0 /100 WBCS — SIGNIFICANT CHANGE UP (ref 0–0)
PLATELET # BLD AUTO: 265 K/UL — SIGNIFICANT CHANGE UP (ref 150–400)
POTASSIUM SERPL-MCNC: 4.5 MMOL/L — SIGNIFICANT CHANGE UP (ref 3.5–5.3)
POTASSIUM SERPL-SCNC: 4.5 MMOL/L — SIGNIFICANT CHANGE UP (ref 3.5–5.3)
RBC # BLD: 3.66 M/UL — LOW (ref 3.8–5.2)
RBC # FLD: 13.1 % — SIGNIFICANT CHANGE UP (ref 10.3–14.5)
SODIUM SERPL-SCNC: 142 MMOL/L — SIGNIFICANT CHANGE UP (ref 135–145)
WBC # BLD: 8.56 K/UL — SIGNIFICANT CHANGE UP (ref 3.8–10.5)
WBC # FLD AUTO: 8.56 K/UL — SIGNIFICANT CHANGE UP (ref 3.8–10.5)

## 2019-10-17 PROCEDURE — 82962 GLUCOSE BLOOD TEST: CPT

## 2019-10-17 PROCEDURE — 81001 URINALYSIS AUTO W/SCOPE: CPT

## 2019-10-17 PROCEDURE — 97166 OT EVAL MOD COMPLEX 45 MIN: CPT

## 2019-10-17 PROCEDURE — 70450 CT HEAD/BRAIN W/O DYE: CPT

## 2019-10-17 PROCEDURE — 93005 ELECTROCARDIOGRAM TRACING: CPT

## 2019-10-17 PROCEDURE — 85610 PROTHROMBIN TIME: CPT

## 2019-10-17 PROCEDURE — 97162 PT EVAL MOD COMPLEX 30 MIN: CPT

## 2019-10-17 PROCEDURE — 83036 HEMOGLOBIN GLYCOSYLATED A1C: CPT

## 2019-10-17 PROCEDURE — 97116 GAIT TRAINING THERAPY: CPT

## 2019-10-17 PROCEDURE — 85027 COMPLETE CBC AUTOMATED: CPT

## 2019-10-17 PROCEDURE — 99239 HOSP IP/OBS DSCHRG MGMT >30: CPT

## 2019-10-17 PROCEDURE — 85730 THROMBOPLASTIN TIME PARTIAL: CPT

## 2019-10-17 PROCEDURE — 84484 ASSAY OF TROPONIN QUANT: CPT

## 2019-10-17 PROCEDURE — 36415 COLL VENOUS BLD VENIPUNCTURE: CPT

## 2019-10-17 PROCEDURE — 96360 HYDRATION IV INFUSION INIT: CPT

## 2019-10-17 PROCEDURE — 80048 BASIC METABOLIC PNL TOTAL CA: CPT

## 2019-10-17 PROCEDURE — 99285 EMERGENCY DEPT VISIT HI MDM: CPT | Mod: 25

## 2019-10-17 PROCEDURE — 80061 LIPID PANEL: CPT

## 2019-10-17 PROCEDURE — 80053 COMPREHEN METABOLIC PANEL: CPT

## 2019-10-17 RX ORDER — METOPROLOL TARTRATE 50 MG
12.5 TABLET ORAL
Qty: 0 | Refills: 0 | DISCHARGE
Start: 2019-10-17

## 2019-10-17 RX ORDER — MECLIZINE HCL 12.5 MG
1 TABLET ORAL
Qty: 20 | Refills: 0
Start: 2019-10-17

## 2019-10-17 RX ORDER — METOPROLOL TARTRATE 50 MG
0.5 TABLET ORAL
Qty: 60 | Refills: 0
Start: 2019-10-17

## 2019-10-17 RX ORDER — AMLODIPINE BESYLATE 2.5 MG/1
10 TABLET ORAL DAILY
Refills: 0 | Status: DISCONTINUED | OUTPATIENT
Start: 2019-10-17 | End: 2019-10-17

## 2019-10-17 RX ORDER — ACETAMINOPHEN 500 MG
650 TABLET ORAL ONCE
Refills: 0 | Status: COMPLETED | OUTPATIENT
Start: 2019-10-17 | End: 2019-10-17

## 2019-10-17 RX ORDER — FAMOTIDINE 10 MG/ML
20 INJECTION INTRAVENOUS DAILY
Refills: 0 | Status: DISCONTINUED | OUTPATIENT
Start: 2019-10-17 | End: 2019-10-17

## 2019-10-17 RX ORDER — MECLIZINE HCL 12.5 MG
1 TABLET ORAL
Qty: 0 | Refills: 0 | DISCHARGE
Start: 2019-10-17

## 2019-10-17 RX ADMIN — CLOPIDOGREL BISULFATE 75 MILLIGRAM(S): 75 TABLET, FILM COATED ORAL at 11:34

## 2019-10-17 RX ADMIN — FAMOTIDINE 20 MILLIGRAM(S): 10 INJECTION INTRAVENOUS at 11:34

## 2019-10-17 RX ADMIN — Medication 650 MILLIGRAM(S): at 17:51

## 2019-10-17 RX ADMIN — DORZOLAMIDE HYDROCHLORIDE TIMOLOL MALEATE 1 DROP(S): 20; 5 SOLUTION/ DROPS OPHTHALMIC at 11:34

## 2019-10-17 RX ADMIN — Medication 650 MILLIGRAM(S): at 11:34

## 2019-10-17 RX ADMIN — Medication 4: at 11:48

## 2019-10-17 RX ADMIN — BRIMONIDINE TARTRATE 1 DROP(S): 2 SOLUTION/ DROPS OPHTHALMIC at 05:30

## 2019-10-17 RX ADMIN — AMLODIPINE BESYLATE 10 MILLIGRAM(S): 2.5 TABLET ORAL at 11:34

## 2019-10-17 RX ADMIN — Medication 12.5 MILLIGRAM(S): at 17:51

## 2019-10-17 RX ADMIN — Medication 12.5 MILLIGRAM(S): at 05:30

## 2019-10-17 RX ADMIN — BRIMONIDINE TARTRATE 1 DROP(S): 2 SOLUTION/ DROPS OPHTHALMIC at 14:45

## 2019-10-17 RX ADMIN — ENOXAPARIN SODIUM 40 MILLIGRAM(S): 100 INJECTION SUBCUTANEOUS at 11:34

## 2019-10-17 RX ADMIN — Medication 81 MILLIGRAM(S): at 11:34

## 2019-10-17 RX ADMIN — Medication 650 MILLIGRAM(S): at 18:20

## 2019-10-17 NOTE — DISCHARGE NOTE NURSING/CASE MANAGEMENT/SOCIAL WORK - PATIENT PORTAL LINK FT
You can access the FollowMyHealth Patient Portal offered by [facility] by registering at the following website: [email or web address]. By joining [facility]’s FollowMyHealth portal, you will also be able to view your health information using other applications (apps) compatible with our system.

## 2019-10-17 NOTE — DISCHARGE NOTE PROVIDER - INSTRUCTIONS
diabetic consistent carbohydrate diet -diabetic consistent carbohydrate diet  -finger sticks as instructed to test your blood sugar

## 2019-10-17 NOTE — PROGRESS NOTE ADULT - PROBLEM SELECTOR PLAN 2
-BS over last 24 hrs. 132>169; will controlled; continue lantus insulin and ISS, HgbA1C: 8.7  - Diabetes Nurse Educator stefan pending; patient admits that she does not do finger-sticks at home prior to insulin dosing  -Creat 1.57; avoid nephrotoxins -BS over last 24 hrs. 132>169; well controlled; continue lantus insulin and ISS, HgbA1C: 8.7  - Diabetes Nurse Educator stefan pending; patient admits that she does not do finger-sticks at home prior to insulin dosing  -Creat 1.57; avoid nephrotoxins

## 2019-10-17 NOTE — DISCHARGE NOTE NURSING/CASE MANAGEMENT/SOCIAL WORK - NSDCPEPTSTRK_GEN_ALL_CORE
Risk factors for stroke/Need for follow up after discharge/Stroke education booklet/Stroke warning signs and symptoms/Signs and symptoms of stroke/Stroke support groups for patients, families, and friends/Prescribed medications/Call 911 for stroke

## 2019-10-17 NOTE — DISCHARGE NOTE PROVIDER - HOSPITAL COURSE
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  Pt presents today to ED complaining of dizziness and weakness for the past 2-3 days.  Pt reports being discharged on lantus and taking it for the first time on two nights ago.  Reports waking up the next morning with some dizziness, weakness, also with nausea and vomiting which has since resolved.  Pt reports not taking lantus the next night but woke up this morning with persistent dizziness and weakness.  Pt also reports right sided facial and hand numbness and paresthesias. 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  Pt presents to the ED complaining of dizziness and weakness for the past 2-3 days.  Pt reports being discharged on lantus insulin and taking it for the first time , but not doing accuchecks.  She reports waking up the next morning with some dizziness, weakness, also with nausea and vomiting which has since resolved.   Pt also reports right sided facial and hand numbness and paresthesias, which is not new, chronic sx from previous CVA.      Pt. had a CT of the brain which was negative for acute changes.  She worked with PT, was able to ambulate with assistance; but was recommended for Home PT.    Per Neurology evaluation; pt is medically maximized on ASA, high dose statin and Plavix.      Pt. seen by DM Nurse Educator.    D/C home with homecare, home PT.    Dr. Shay, PCP for f/u appt. 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  Pt presents to the ED complaining of dizziness and weakness for the past 2-3 days.  Pt reports being discharged on lantus insulin and taking it for the first time , but not doing accuchecks.  She reports waking up the next morning with some dizziness, weakness, also with nausea and vomiting which has since resolved.   Pt also reports right sided facial and hand numbness and paresthesias, which is not new, chronic sx from previous CVA.      Pt. had a CT of the brain which was negative for acute changes.  She worked with PT, was able to ambulate with assistance; but was recommended for Home PT.    Per Neurology evaluation; pt is medically maximized on ASA, high dose statin and Plavix.      Pt. seen by DM Nurse Educator.    D/C home with homecare, home PT.    Dr. Shay, PCP for f/u appt.

## 2019-10-17 NOTE — DISCHARGE NOTE PROVIDER - CARE PROVIDER_API CALL
Dr Bk hernandez  f/u with your PMD  Phone: (   )    -  Fax: (   )    -  Follow Up Time: Dr Bk hernandez  f/u with your PMD  Phone: (   )    -  Fax: (   )    -  Follow Up Time:     Otis Bourne (DO)  Neurology; Vascular Neurology  [address]  [place] [address]  Phone: [phone]  Fax: [phone]  Follow Up Time:

## 2019-10-17 NOTE — PROGRESS NOTE ADULT - PROBLEM SELECTOR PLAN 1
-No new CVA; she has hx of recent left thalamic infarct; discharged from Acute Rehab on 10/12  -No acute changes on CT of head 10/15; pt with all chronic sx (had chronic dizziness, right sided weakness and loss of sensation)  -continue ASA, Plavix, high dose Statin  -PT eval: recommendation is for home with home PT  -Neurology consult appreciated; continue medical mgmt

## 2019-10-17 NOTE — DISCHARGE NOTE PROVIDER - PROVIDER TOKENS
FREE:[LAST:[david],FIRST:[Dr Bourgeois],PHONE:[(   )    -],FAX:[(   )    -],ADDRESS:[f/u with your PMD]] FREE:[LAST:[david],FIRST:[Dr Bourgeois],PHONE:[(   )    -],FAX:[(   )    -],ADDRESS:[f/u with your PMD]],PROVIDER:[TOKEN:[[id number]]]

## 2019-10-17 NOTE — DISCHARGE NOTE PROVIDER - NSDCCPCAREPLAN_GEN_ALL_CORE_FT
PRINCIPAL DISCHARGE DIAGNOSIS  Diagnosis: Dizziness  Assessment and Plan of Treatment: PRINCIPAL DISCHARGE DIAGNOSIS  Diagnosis: Dizziness  Assessment and Plan of Treatment: - follow up with pcp and neurology  - take medications as rx

## 2019-10-17 NOTE — PROGRESS NOTE ADULT - PROBLEM SELECTOR PLAN 3
pt at baseline Creat, continue to follow bmp; she was seen by Nephrology on last admission in Acute Rehab; no further recommendations

## 2019-10-17 NOTE — PROGRESS NOTE ADULT - SUBJECTIVE AND OBJECTIVE BOX
Patient is a 59y old  Female who presents with a chief complaint of Dizziness and Weakness (16 Oct 2019 14:04)  Patient seen and examined at bedside.  Pt. with no events overnight.    ALLERGIES:  No Known Allergies    MEDICATIONS  (STANDING):  aspirin enteric coated 81 milliGRAM(s) Oral daily  atorvastatin 80 milliGRAM(s) Oral at bedtime  brimonidine 0.2% Ophthalmic Solution 1 Drop(s) Both EYES three times a day  clopidogrel Tablet 75 milliGRAM(s) Oral daily  dextrose 5%. 1000 milliLiter(s) (50 mL/Hr) IV Continuous <Continuous>  dextrose 50% Injectable 12.5 Gram(s) IV Push once  dextrose 50% Injectable 25 Gram(s) IV Push once  dextrose 50% Injectable 25 Gram(s) IV Push once  dorzolamide 2%/timolol 0.5% Ophthalmic Solution 1 Drop(s) Both EYES <User Schedule>  enoxaparin Injectable 40 milliGRAM(s) SubCutaneous daily  insulin glargine Injectable (LANTUS) 15 Unit(s) SubCutaneous at bedtime  insulin lispro (HumaLOG) corrective regimen sliding scale   SubCutaneous three times a day before meals  insulin lispro (HumaLOG) corrective regimen sliding scale   SubCutaneous at bedtime  latanoprost 0.005% Ophthalmic Solution 1 Drop(s) Right EYE at bedtime  metoprolol tartrate 12.5 milliGRAM(s) Oral two times a day  sodium bicarbonate 650 milliGRAM(s) Oral daily    MEDICATIONS  (PRN):  dextrose 40% Gel 15 Gram(s) Oral once PRN Blood Glucose LESS THAN 70 milliGRAM(s)/deciliter  glucagon  Injectable 1 milliGRAM(s) IntraMuscular once PRN Glucose LESS THAN 70 milligrams/deciliter  meclizine 25 milliGRAM(s) Oral every 8 hours PRN Dizziness    Vital Signs Last 24 Hrs  T(F): 98.2 (17 Oct 2019 05:23), Max: 98.8 (16 Oct 2019 16:09)  HR: 70 (17 Oct 2019 05:23) (70 - 78)  BP: 147/73 (17 Oct 2019 05:23) (136/69 - 150/66)  RR: 15 (17 Oct 2019 05:23) (15 - 15)  SpO2: 96% (17 Oct 2019 05:23) (96% - 98%)  I&O's Summary    16 Oct 2019 07:01  -  17 Oct 2019 07:00  --------------------------------------------------------  IN: 900 mL / OUT: 0 mL / NET: 900 mL    17 Oct 2019 07:  -  17 Oct 2019 10:04  --------------------------------------------------------  IN: 340 mL / OUT: 0 mL / NET: 340 mL      PHYSICAL EXAM:  General: NAD, A/O x 3  ENT:  right eye blindness  Neck: Supple, No JVD  Lungs:  breathing not labored, Clear to auscultation bilaterally  Cardio: RRR, S1/S2, No murmurs  Abdomen: Soft, Nontender, Nondistended; Bowel sounds present  Extremities: No calf tenderness, No pitting edema  Neuro:  speech slightly slurred, right sided weakness    LABS:                        9.7    8.56  )-----------( 265      ( 17 Oct 2019 07:40 )             30.5     10-    142  |  109  |  46  ----------------------------<  160  4.5   |  23  |  1.57    Ca    9.4      17 Oct 2019 07:40    TPro  6.4  /  Alb  2.6  /  TBili  0.3  /  DBili  x   /  AST  18  /  ALT  22  /  AlkPhos  79  10-16    eGFR if Non African American: 35 mL/min/1.73M2 (10-17-19 @ 07:40)  eGFR if : 41 mL/min/1.73M2 (10-17-19 @ 07:40)    PT/INR - ( 15 Oct 2019 16:33 )   PT: 10.1 sec;   INR: 0.90 ratio         PTT - ( 15 Oct 2019 16:33 )  PTT:28.2 sec    CARDIAC MARKERS ( 16 Oct 2019 07:12 )  <.017 ng/mL / x     / x     / x     / x      CARDIAC MARKERS ( 15 Oct 2019 16:33 )  <.017 ng/mL / x     / x     / x     / x          10-16 Chol 204 mg/dL  mg/dL HDL 48 mg/dL Trig 152 mg/dL              POCT Blood Glucose.: 156 mg/dL (17 Oct 2019 07:55)  POCT Blood Glucose.: 132 mg/dL (16 Oct 2019 20:41)  POCT Blood Glucose.: 147 mg/dL (16 Oct 2019 16:36)  POCT Blood Glucose.: 169 mg/dL (16 Oct 2019 11:48)    10-15 [id number] 8.7   [id number] 9.6   LkoxotrrqjA5U 9.4    Urinalysis Basic - ( 15 Oct 2019 21:35 )    Color: Yellow / Appearance: Clear / S.020 / pH: x  Gluc: x / Ketone: Small  / Bili: Negative / Urobili: Negative   Blood: x / Protein: 500 mg/dL / Nitrite: Negative   Leuk Esterase: Small / RBC: 0-4 /HPF / WBC 6-10 /HPF   Sq Epi: x / Non Sq Epi: Moderate / Bacteria: Few /HPF          RADIOLOGY & ADDITIONAL TESTS:    Care Discussed with Consultants/Other Providers: Patient is a 59y old  Female who presents with a chief complaint of Dizziness and Weakness (16 Oct 2019 14:04)  Patient seen and examined at bedside.  Pt. with no events overnight.    ALLERGIES:  No Known Allergies    MEDICATIONS  (STANDING):  aspirin enteric coated 81 milliGRAM(s) Oral daily  atorvastatin 80 milliGRAM(s) Oral at bedtime  brimonidine 0.2% Ophthalmic Solution 1 Drop(s) Both EYES three times a day  clopidogrel Tablet 75 milliGRAM(s) Oral daily  dextrose 5%. 1000 milliLiter(s) (50 mL/Hr) IV Continuous <Continuous>  dextrose 50% Injectable 12.5 Gram(s) IV Push once  dextrose 50% Injectable 25 Gram(s) IV Push once  dextrose 50% Injectable 25 Gram(s) IV Push once  dorzolamide 2%/timolol 0.5% Ophthalmic Solution 1 Drop(s) Both EYES <User Schedule>  enoxaparin Injectable 40 milliGRAM(s) SubCutaneous daily  insulin glargine Injectable (LANTUS) 15 Unit(s) SubCutaneous at bedtime  insulin lispro (HumaLOG) corrective regimen sliding scale   SubCutaneous three times a day before meals  insulin lispro (HumaLOG) corrective regimen sliding scale   SubCutaneous at bedtime  latanoprost 0.005% Ophthalmic Solution 1 Drop(s) Right EYE at bedtime  metoprolol tartrate 12.5 milliGRAM(s) Oral two times a day  sodium bicarbonate 650 milliGRAM(s) Oral daily    MEDICATIONS  (PRN):  dextrose 40% Gel 15 Gram(s) Oral once PRN Blood Glucose LESS THAN 70 milliGRAM(s)/deciliter  glucagon  Injectable 1 milliGRAM(s) IntraMuscular once PRN Glucose LESS THAN 70 milligrams/deciliter  meclizine 25 milliGRAM(s) Oral every 8 hours PRN Dizziness    Vital Signs Last 24 Hrs  T(F): 98.2 (17 Oct 2019 05:23), Max: 98.8 (16 Oct 2019 16:09)  HR: 70 (17 Oct 2019 05:23) (70 - 78)  BP: 147/73 (17 Oct 2019 05:23) (136/69 - 150/66)  RR: 15 (17 Oct 2019 05:23) (15 - 15)  SpO2: 96% (17 Oct 2019 05:23) (96% - 98%)  I&O's Summary    16 Oct 2019 07:01  -  17 Oct 2019 07:00  --------------------------------------------------------  IN: 900 mL / OUT: 0 mL / NET: 900 mL    17 Oct 2019 07:  -  17 Oct 2019 10:04  --------------------------------------------------------  IN: 340 mL / OUT: 0 mL / NET: 340 mL      PHYSICAL EXAM:  General: NAD, alert  ENT:  right eye blindness  Neck: Supple, No JVD  Lungs:  breathing not labored, Clear to auscultation bilaterally  Cardio: +s1/s2  Abdomen: Soft, Nontender, Nondistended; Bowel sounds present  Extremities: No calf tenderness, No pitting edema  Neuro:  speech slightly slurred, right sided weakness    LABS:                        9.7    8.56  )-----------( 265      ( 17 Oct 2019 07:40 )             30.5     10-    142  |  109  |  46  ----------------------------<  160  4.5   |  23  |  1.57    Ca    9.4      17 Oct 2019 07:40    TPro  6.4  /  Alb  2.6  /  TBili  0.3  /  DBili  x   /  AST  18  /  ALT  22  /  AlkPhos  79  10-16    eGFR if Non African American: 35 mL/min/1.73M2 (10-17-19 @ 07:40)  eGFR if : 41 mL/min/1.73M2 (10-17-19 @ 07:40)    PT/INR - ( 15 Oct 2019 16:33 )   PT: 10.1 sec;   INR: 0.90 ratio         PTT - ( 15 Oct 2019 16:33 )  PTT:28.2 sec    CARDIAC MARKERS ( 16 Oct 2019 07:12 )  <.017 ng/mL / x     / x     / x     / x      CARDIAC MARKERS ( 15 Oct 2019 16:33 )  <.017 ng/mL / x     / x     / x     / x          10-16 Chol 204 mg/dL  mg/dL HDL 48 mg/dL Trig 152 mg/dL    Glucose  POCT Blood Glucose.: 156 mg/dL (17 Oct 2019 07:55)  POCT Blood Glucose.: 132 mg/dL (16 Oct 2019 20:41)  POCT Blood Glucose.: 147 mg/dL (16 Oct 2019 16:36)  POCT Blood Glucose.: 169 mg/dL (16 Oct 2019 11:48)    10-15 [id number] 8.7   JahcbtghrdP6L 9.6   DhqabammypO8N 9.4    Urinalysis Basic - ( 15 Oct 2019 21:35 )  Color: Yellow / Appearance: Clear / S.020 / pH: x  Gluc: x / Ketone: Small  / Bili: Negative / Urobili: Negative   Blood: x / Protein: 500 mg/dL / Nitrite: Negative   Leuk Esterase: Small / RBC: 0-4 /HPF / WBC 6-10 /HPF   Sq Epi: x / Non Sq Epi: Moderate / Bacteria: Few /HPF

## 2019-10-17 NOTE — PROGRESS NOTE ADULT - ATTENDING COMMENTS
I have personally seen and examined patient on the above date.  I discussed the case with MOUNIKA Aleman and I agree with findings and plan as detailed per note above, which I have amended where appropriate.

## 2019-10-17 NOTE — DISCHARGE NOTE PROVIDER - NSDCFUADDINST_GEN_ALL_CORE_FT
1.  follow up as planned with your Opthamologist within the next 2 weeks 1.  Call your PMD, Dr. Shay, for follow up appt. within the next 3-5 days.  2.  Follow up (as planned) with your Opthamologist within the next 2 weeks 1.  Call your PMD, Dr. Shay, for follow up appt. within the next 3-5 days.  2.  Follow up (as planned) with your Opthamologist within the next 2 weeks.  3.  Schedule an appt. with your Neurologist, Dr. Bourne within the next 5-7 days.

## 2019-10-17 NOTE — PROGRESS NOTE ADULT - ASSESSMENT
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  D/C planning for home with homecare and home PT today.      CAPRINI SCORE [CLOT]    AGE RELATED RISK FACTORS                                                       MOBILITY RELATED FACTORS  [x] Age 41-60 years                                            (1 Point)                  [ ] Bed rest                                                        (1 Point)  [ ] Age: 61-74 years                                           (2 Points)                 [ ] Plaster cast                                                   (2 Points)  [ ] Age= 75 years                                              (3 Points)                 [ ] Bed bound for more than 72 hours                 (2 Points)    DISEASE RELATED RISK FACTORS                                               GENDER SPECIFIC FACTORS  [ ] Edema in the lower extremities                       (1 Point)                  [ ] Pregnancy                                                     (1 Point)  [ ] Varicose veins                                               (1 Point)                  [ ] Post-partum < 6 weeks                                   (1 Point)             [ ] BMI > 25 Kg/m2                                            (1 Point)                  [ ] Hormonal therapy  or oral contraception          (1 Point)                 [ ] Sepsis (in the previous month)                        (1 Point)                  [ ] History of pregnancy complications                 (1 point)  [ ] Pneumonia or serious lung disease                                               [ ] Unexplained or recurrent                     (1 Point)           (in the previous month)                               (1 Point)  [ ] Abnormal pulmonary function test                     (1 Point)                 SURGERY RELATED RISK FACTORS  [ ] Acute myocardial infarction                              (1 Point)                 [ ]  Section                                             (1 Point)  [ ] Congestive heart failure (in the previous month)  (1 Point)               [ ] Minor surgery                                                  (1 Point)   [ ] Inflammatory bowel disease                             (1 Point)                 [ ] Arthroscopic surgery                                        (2 Points)  [ ] Central venous access                                      (2 Points)                [ ] General surgery lasting more than 45 minutes   (2 Points)       [x] Stroke (in the previous month)                          (5 Points)               [ ] Elective arthroplasty                                         (5 Points)                                                                                                                                               HEMATOLOGY RELATED FACTORS                                                 TRAUMA RELATED RISK FACTORS  [ ] Prior episodes of VTE                                     (3 Points)                [ ] Fracture of the hip, pelvis, or leg                       (5 Points)  [ ] Positive family history for VTE                         (3 Points)                 [ ] Acute spinal cord injury (in the previous month)  (5 Points)  [ ] Prothrombin 20877 A                                     (3 Points)                 [ ] Paralysis  (less than 1 month)                             (5 Points)  [ ] Factor V Leiden                                             (3 Points)                  [ ] Multiple Trauma within 1 month                        (5 Points)  [ ] Lupus anticoagulants                                     (3 Points)                                                           [ ] Anticardiolipin antibodies                               (3 Points)                                                       [ ] High homocysteine in the blood                      (3 Points)                                             [ ] Other congenital or acquired thrombophilia      (3 Points)                                                [ ] Heparin induced thrombocytopenia                  (3 Points)                                          Total Score [   6       ]    Caprini Score 0 - 2:  Low Risk, No VTE Prophylaxis required for most patients, encourage ambulation  Caprini Score 3 - 6:  At Risk, pharmacologic VTE prophylaxis is indicated for most patients (in the absence of a contraindication)  Caprini Score Greater than or = 7:  High Risk, pharmacologic VTE prophylaxis is indicated for most patients (in the absence of a contraindication) 59 female with pmh htn, dm2 (with retinopathy?), glaucoma, right eye blindess, hearing loss (has hearing aids), s/p discharge from [facility] secondary to left thalamic infarct with residual right sided weakness presents with dizziness, weakness and numbness.  D/C planning for home with homecare and home PT today.

## 2020-03-17 NOTE — CONSULT NOTE ADULT - SUBJECTIVE AND OBJECTIVE BOX
[facility] Mobility Note       Activity: Bathroom privileges     Assistive Device: None HPI:  58 y/o F w/ hx of Type 2 DM x > 10 years complicated by neuropathy, nephropathy, retinopathy in her right eye, CVA. A1c 9.6% At home on Levemir 16 units BID and Novolog 10 units with meals, but had been nonadherent. Also supposed to be on Januvia +/- metformin but  patient unsure of this. She has a fear of needles so would wait for her son to get home from work to give her injections. She does not check her glucose for the same reasons and is interested in a CGM. She has tried to overcome her fears recently and tried giving herself some of her insulin doses. She denies hypoglycemia. Hx of nonadherence to diet, but recently stopped drinking as much soda and eating less carbs. Denies change in weight or nausea or vomiting. +polyuria and polydipsia.   Also hx of stroke? 3 years ago (presented with right hemiparesis/dysarthria), HTN, who presented with right hand weakness/numbness and right mouth numbness. LKN was 18:30 on 9/27.  Patient had previously taken aspirin, but does not remember why she decided to stop taking it. Patient does not have a neurologist. Patient ambulates with a cane due to right sided weakness/states she drifts to the right without a cane for the past 3 years. Patient states that she gets physical therapy twice per week.       PAST MEDICAL & SURGICAL HISTORY:  CVA (cerebral vascular accident)  Hypertension  Diabetes mellitus  Diabetes  No significant past surgical history      FAMILY HISTORY:  Family history of cerebrovascular accident (CVA)  No pertinent family history in first degree relatives      Social History: No tobacco or alcohol abuse    Outpatient Medications:  · 	atorvastatin 20 mg oral tablet: 1 tab(s) orally once a day (at bedtime)  · 	Plavix 75 mg oral tablet: 1 tab(s) orally once a day  · 	aspirin 325 mg oral tablet: 1 tab(s) orally once a day  · 	amLODIPine 5 mg oral tablet: 1 tab(s) orally once a day  · 	glucose test strips for [employer]bot Freestyle glucometer: test sugar 3 times a day  · 	lancets: use as directed  · 	alcohol swabs: use as directed  · 	BD Jessy pen needles 4mm: 1 unit(s) use as directed 3 times a day  · 	Valium 5 mg oral tablet: 1 tab(s) orally every 8 hours  · 	Percocet 5/325 oral tablet: 1 tab(s) orally every 6 hours, As Needed - for moderate pain  · 	 mg oral tablet: 1 tab(s) orally every 8 hours, As Needed  · 	atorvastatin 80 mg oral tablet: 1 tab(s) orally once a day  · 	Janumet 50 mg-1000 mg oral tablet: 1 tab(s) orally 2 times a day  · 	lisinopril-hydrochlorothiazide 20 mg-12.5 mg oral tablet: 2 tab(s) orally once a day  · 	NovoLOG 100 units/mL subcutaneous solution: 10 unit(s) subcutaneous 3 times a day  · 	Levemir 100 units/mL subcutaneous solution: 16 units BID but pt non-compliant    MEDICATIONS  (STANDING):  aspirin  chewable 81 milliGRAM(s) Oral daily  atorvastatin 80 milliGRAM(s) Oral at bedtime  clopidogrel Tablet 75 milliGRAM(s) Oral daily  dextrose 5%. 1000 milliLiter(s) (50 mL/Hr) IV Continuous <Continuous>  dextrose 50% Injectable 25 Gram(s) IV Push once  dextrose 50% Injectable 25 Gram(s) IV Push once  dextrose 50% Injectable 12.5 Gram(s) IV Push once  enoxaparin Injectable 30 milliGRAM(s) SubCutaneous daily  insulin lispro (HumaLOG) corrective regimen sliding scale   SubCutaneous three times a day before meals  insulin lispro Injectable (HumaLOG) 3 Unit(s) SubCutaneous three times a day before meals  sodium chloride 0.9%. 1000 milliLiter(s) (50 mL/Hr) IV Continuous <Continuous>    MEDICATIONS  (PRN):  dextrose 40% Gel 15 Gram(s) Oral once PRN Blood Glucose LESS THAN 70 milliGRAM(s)/deciliter  glucagon  Injectable 1 milliGRAM(s) IntraMuscular once PRN Glucose LESS THAN 70 milligrams/deciliter      Allergies    No Known Allergies    Intolerances      Review of Systems:  Constitutional: No fever, No change in weight  Eyes:n +retinopathy with right eye blindness  Neuro: No headache, +neuropathy  HEENT: No throat pain +hearing loss; +dysphagia to meats  Cardiovascular: No chest pain  Respiratory: No SOB  GI: No nausea or vomiting  : + polyuria  Skin: no rash  Psych: no depression  Endocrine: + polydipsia, No heat or cold intolerance, rest as noted in HPI  Hem/lymph: no swelling    All other review of systems negative      PHYSICAL EXAM:  VITALS: T(C): 37.2 (10-03-19 @ 12:36)  T(F): 98.9 (10-03-19 @ 12:36), Max: 98.9 (10-03-19 @ 12:36)  HR: 80 (10-03-19 @ 12:36) (80 - 97)  BP: 146/87 (10-03-19 @ 12:36) (102/67 - 158/94)  RR:  (17 - 18)  SpO2:  (97% - 100%)  Wt(kg): --  GENERAL: NAD at this time  EYES: No proptosis, EOMI  HEENT:  Atraumatic, Normocephalic,   THYROID: Normal size, no palpable nodules  RESPIRATORY: Clear to auscultation bilaterally, full excursion, non-labored  CARDIOVASCULAR: Regular rhythm; No murmurs; no peripheral edema  GI: Soft, nontender, non distended, normal bowel sounds  SKIN: Dry, intact, No rashes or lesions  MUSCULOSKELETAL: normal strength  NEURO: follows commands  PSYCH: normal affect, normal mood  CUSHING'S SIGNS: no striae      POCT Blood Glucose.: 155 mg/dL (10-03-19 @ 13:03)  POCT Blood Glucose.: 154 mg/dL (10-03-19 @ 09:29)  POCT Blood Glucose.: 122 mg/dL (10-02-19 @ 21:30)  POCT Blood Glucose.: 106 mg/dL (10-02-19 @ 17:30)  POCT Blood Glucose.: 84 mg/dL (10-02-19 @ 12:30)  POCT Blood Glucose.: 173 mg/dL (10-02-19 @ 08:21)  POCT Blood Glucose.: 87 mg/dL (10-01-19 @ 21:39)  POCT Blood Glucose.: 118 mg/dL (10-01-19 @ 17:32)  POCT Blood Glucose.: 207 mg/dL (10-01-19 @ 13:09)  POCT Blood Glucose.: 155 mg/dL (10-01-19 @ 08:34)  POCT Blood Glucose.: 130 mg/dL (09-30-19 @ 17:34)                              10.3   5.91  )-----------( 241      ( 03 Oct 2019 13:56 )             33.2       10-03    140  |  112<H>  |  69<H>  ----------------------------<  181<H>  5.0   |  16<L>  |  1.94<H>    EGFR if : 32<L>  EGFR if non : 27<L>    Ca    9.5      10-03    TPro  7.0  /  Alb  3.8  /  TBili  0.4  /  DBili  x   /  AST  16  /  ALT  12  /  AlkPhos  93  10-02    Thyroid Function Tests:  09-28 @ 15:57 TSH 1.38 FreeT4 -- T3 -- Anti TPO -- Anti Thyroglobulin Ab -- TSI --      Hemoglobin A1C, Whole Blood: 9.6 % <H> [4.0 - 5.6] (09-30-19 @ 08:11)  Hemoglobin A1C, Whole Blood: 9.6 % <H> [4.0 - 5.6] (09-30-19 @ 08:11)  Hemoglobin A1C, Whole Blood: 9.4 % <H> [4.0 - 5.6] (09-29-19 @ 09:35)      09-30 Chol 384<H> <H> HDL 64 Trig 251<H>, 09-29 Chol 310<H> <H> HDL 49<L> Trig 248<H>  Radiology:

## 2020-06-12 NOTE — CONSULT NOTE ADULT - SUBJECTIVE AND OBJECTIVE BOX
0330 
Timeout. Pt and procedure identified. Dr Juana Marroquin and PA Mac at bedside. [id number] Dr Juana Marroquin administered 60mg Propofol Pt began to posture rigidly. Procedure paused. 3978 Dr wadsworth administered 3mg Versed and 60mg Propofol Pt postured again, but not as long. Pt relaxed and PA was able to reduce hip. Neurology consult    DEBORAH [id number]     Patient is a 59y old  Female who presents with a chief complaint of Dizziness and Weakness (16 Oct 2019 11:22)      HPI:  59 female with pmh glaucoma, htn, dm, ckd, s/p discharge from [facility] on 10/12/19 secondary to left thalamic infarct with residual right sided weakness.  Pt presents today to ED complaining of dizziness and weakness for the past 2-3 days.  Pt reports being discharged on lantus and taking it for the first time on two nights ago.  Reports waking up the next morning with some dizziness, weakness, also with nausea and vomiting which has since resolved.  Pt reports not taking lantus the next night but woke up this morning with persistent dizziness and weakness.  Pt also reports right sided facial and hand numbness and paresthesias.  Denies abdominal pain, chest pain, sob, fever, diarrhea, headache, visual changes. (15 Oct 2019 18:27)          MEDICATIONS    aspirin enteric coated 81 milliGRAM(s) Oral daily  atorvastatin 80 milliGRAM(s) Oral at bedtime  brimonidine 0.2% Ophthalmic Solution 1 Drop(s) Both EYES three times a day  clopidogrel Tablet 75 milliGRAM(s) Oral daily  dextrose 40% Gel 15 Gram(s) Oral once PRN  dextrose 5%. 1000 milliLiter(s) IV Continuous <Continuous>  dextrose 50% Injectable 12.5 Gram(s) IV Push once  dextrose 50% Injectable 25 Gram(s) IV Push once  dextrose 50% Injectable 25 Gram(s) IV Push once  dorzolamide 2%/timolol 0.5% Ophthalmic Solution 1 Drop(s) Both EYES <User Schedule>  enoxaparin Injectable 40 milliGRAM(s) SubCutaneous daily  glucagon  Injectable 1 milliGRAM(s) IntraMuscular once PRN  insulin glargine Injectable (LANTUS) 15 Unit(s) SubCutaneous at bedtime  insulin lispro (HumaLOG) corrective regimen sliding scale   SubCutaneous three times a day before meals  insulin lispro (HumaLOG) corrective regimen sliding scale   SubCutaneous at bedtime  latanoprost 0.005% Ophthalmic Solution 1 Drop(s) Right EYE at bedtime  meclizine 25 milliGRAM(s) Oral every 8 hours PRN  metoprolol tartrate 12.5 milliGRAM(s) Oral two times a day  sodium bicarbonate 650 milliGRAM(s) Oral daily      PMH: CVA (cerebral vascular accident)  Hypertension  Diabetes mellitus  Diabetes  No pertinent past medical history       PSH: No significant past surgical history      Family history:   FAMILY HISTORY:  Family history of cerebrovascular accident (CVA)      SOCIAL HISTORY:  No history of tobacco or alcohol use     Allergies    No Known Allergies    Intolerances        Height (cm): 157.5 (10-15 @ 21:21)  Weight (kg): 55.5 (10-15 @ 21:21)  BMI (kg/m2): 22.4 (10-15 @ 21:21)    Vital Signs Last 24 Hrs  T(C): 36.8 (16 Oct 2019 06:07), Max: 36.8 (15 Oct 2019 21:21)  T(F): 98.2 (16 Oct 2019 06:07), Max: 98.2 (15 Oct 2019 21:21)  HR: 80 (16 Oct 2019 09:34) (79 - 93)  BP: 132/62 (16 Oct 2019 09:34) (132/62 - 176/100)  BP(mean): --  RR: 15 (16 Oct 2019 06:07) (15 - 20)  SpO2: 98% (16 Oct 2019 06:07) (98% - 100%)      On Neurological Examination:    Head: normocephalic Neck: supple no carotid bruits    Mental Status - Patient is alert attentive approximately oriented speech normal some confusion    Cranial Nerves - Blind OD aphakia OU EOM mild reduced right gaze and up gaze and convergence with nystagmoid movements No VII or XII paresis    Motor Exam :  No drift normal strength tone mild right dystaxia    Sensory :  reduced pin right face and limbs    Reflexes:  symmetric hypo plantars downgoing    Gait -  not tested                                                       LABS:  CBC Full  -  ( 16 Oct 2019 07:12 )  WBC Count : 7.99 K/uL  RBC Count : 3.43 M/uL  Hemoglobin : 9.0 g/dL  Hematocrit : 28.7 %  Platelet Count - Automated : 254 K/uL  Mean Cell Volume : 83.7 fl  Mean Cell Hemoglobin : 26.2 pg  Mean Cell Hemoglobin Concentration : 31.4 gm/dL  Auto Neutrophil # : x  Auto Lymphocyte # : x  Auto Monocyte # : x  Auto Eosinophil # : x  Auto Basophil # : x  Auto Neutrophil % : x  Auto Lymphocyte % : x  Auto Monocyte % : x  Auto Eosinophil % : x  Auto Basophil % : x    Urinalysis Basic - ( 15 Oct 2019 21:35 )    Color: Yellow / Appearance: Clear / S.020 / pH: x  Gluc: x / Ketone: Small  / Bili: Negative / Urobili: Negative   Blood: x / Protein: 500 mg/dL / Nitrite: Negative   Leuk Esterase: Small / RBC: 0-4 /HPF / WBC 6-10 /HPF   Sq Epi: x / Non Sq Epi: Moderate / Bacteria: Few /HPF      10-16    144  |  111<H>  |  40<H>  ----------------------------<  145<H>  4.4   |  25  |  1.56<H>    Ca    9.1      16 Oct 2019 07:12    TPro  6.4  /  Alb  2.6<L>  /  TBili  0.3  /  DBili  x   /  AST  18  /  ALT  22  /  AlkPhos  79  10-16    LIVER FUNCTIONS - ( 16 Oct 2019 07:12 )  Alb: 2.6 g/dL / Pro: 6.4 g/dL / ALK PHOS: 79 U/L / ALT: 22 U/L / AST: 18 U/L / GGT: x           Hemoglobin A1C: Hemoglobin A1C, Whole Blood: 8.7 % (10-15 @ 20:20)    Lipid Panel 10-16 @ 07:12  Total Cholesterol, Serum 204    Triglycerides 152      PT/INR - ( 15 Oct 2019 16:33 )   PT: 10.1 sec;   INR: 0.90 ratio         PTT - ( 15 Oct 2019 16:33 )  PTT:28.2 sec  Total [id number] mg/dL   HDL 48 mg/dL   mg/dL          RADIOLOGY  CT:     < from: CT Head No Cont (10.15. @ 17:03) >    EXAM:  CT BRAIN      PROCEDURE DATE:  10/15/2019        INTERPRETATION:  HISTORY: Dizziness    Evaluation demonstrates no evidence of mass-effect or midline shift. No   intraparenchymal mass lesions or hemorrhage is identified. There is no   evidence of hydrocephalus. No extra-axial collections are noted.    The bone windows demonstrate no gross osseous abnormalities.    Impression:  1. Unremarkable noncontrast CT scan of the brain.              < end of copied text >  My review of CT reveals the prior left thalamic infacrt stable and no heme.

## 2022-03-13 NOTE — ED PROVIDER NOTE - OBJECTIVE STATEMENT
sudden onset Son at bedside  60 yo F PMHx CVA 3 yrs ago with no residual deficits, HTN, DM on Insulin and Janumet, presents for Right lower facial numbness and right hand numbness since 6:45pm last night, described as pins and needles. The numbness persisted and has been constant, which brings her here for evaluation. Denies HA, weakness, slurred speech, n/v, diplopia, blurred vision, dizziness, cp, sob, fever, cough. Pt is right hand dominant. PMD in [place] Son at bedside  58 yo F PMHx CVA 3 yrs ago with no residual deficits, HTN, DM on Insulin and Janumet, presents for Right lower facial numbness and right hand numbness since 6:45pm last night after eating frog legs, described as pins and needles. The numbness persisted and has been constant, which brings her here for evaluation. Denies HA, weakness, slurred speech, n/v, diplopia, blurred vision, dizziness, cp, sob, fever, cough. Pt is right hand dominant. PMD in [place]

## 2022-05-16 NOTE — ED ADULT NURSE NOTE - NS ED NURSE DISCH DISPOSITION
You should return to the emergency department right away if you develop any increasing headache, confusion , difficulty thinking or new strength problems.    For pain, continue to use Tylenol 650mg up to 3 times daily as needed    For your nighttime more severe pain in your right buttock you can also use the stronger pain medication OXYCODONE 5mg in the evening before bed.  You have been prescribed 10 tablets.  I do not expect you will need any refills of this medication.    Use a stool softener when you are on the stronger pain medication to prevent constipation.    As we discussed this pain medication can cause confusion, balance problems and you should be cautious with walking and get help from your family until you know your balance is okay with the medication.    Return to clinic in 2 weeks for a recheck of your hand and leg injuries.      
Admitted

## 2022-06-23 NOTE — PHYSICAL THERAPY INITIAL EVALUATION ADULT - ADDITIONAL COMMENTS
patient lives at home in an apartment with elevator access with her adult son, upon DC from rehab, she was ambulating with SAC and performed ADLs with occasional supervison/assist from her son, she owns a SAC (E4) spontaneous

## 2022-07-28 NOTE — ED ADULT NURSE NOTE - NS ED NURSE LEVEL OF CONSCIOUSNESS AFFECT
Calm

## 2022-09-01 NOTE — H&P ADULT - NSHPLABSRESULTS_GEN_ALL_CORE
12.1   7.9   )-----------( 277      ( 28 Sep 2019 14:02 )             38.4     09-28    141  |  108  |  48<H>  ----------------------------<  182<H>  5.0   |  21<L>  |  1.74<H>    Ca    10.3      28 Sep 2019 14:02  Phos  3.8     09-28  Mg     1.9     09-28    TPro  7.9  /  Alb  4.1  /  TBili  0.3  /  DBili  x   /  AST  9<L>  /  ALT  6<L>  /  AlkPhos  95  09-28    LIVER FUNCTIONS - ( 28 Sep 2019 14:02 )  Alb: 4.1 g/dL / Pro: 7.9 g/dL / ALK PHOS: 95 U/L / ALT: 6 U/L / AST: 9 U/L / GGT: x             < from: CT Head No Cont (09.28.19 @ 14:13) >    IMPRESSION:   No CT evidence of acute intracranial hemorrhage, extra-axial collection,   or mass effect.       < end of copied text > Germania Gregory is a 41 year old female presenting bilateral leg swelling.  Noticed swelling this week.  Worse last night accompanied with pain.    Denies known Latex allergy or symptoms of Latex sensitivity.  Currently is not taking any medications.  Social History     Tobacco Use   Smoking Status Former Smoker   • Packs/day: 0.20   • Types: Cigarettes   Smokeless Tobacco Former User   • Quit date: 8/18/2011   Tobacco Comment    2-3 cigs daily     Patient would like communication of their results via:        Cell Phone:   Telephone Information:   Mobile [phone]     Okay to leave a message containing results? Yes

## 2022-10-12 NOTE — DISCHARGE NOTE NURSING/CASE MANAGEMENT/SOCIAL WORK - PATIENT PORTAL LINK FT
You can access the FollowMyHealth Patient Portal offered by [facility] by registering at the following website: [email or web address]. By joining [employer]’s FollowMyHealth portal, you will also be able to view your health information using other applications (apps) compatible with our system. Olumiant Counseling: I discussed with the patient the risks of Olumiant therapy including but not limited to upper respiratory tract infections, shingles, cold sores, and nausea. Live vaccines should be avoided.  This medication has been linked to serious infections; higher rate of mortality; malignancy and lymphoproliferative disorders; major adverse cardiovascular events; thrombosis; gastrointestinal perforations; neutropenia; lymphopenia; anemia; liver enzyme elevations; and lipid elevations.

## 2023-01-13 NOTE — CONSULT NOTE ADULT - SUBJECTIVE AND OBJECTIVE BOX
HPI: 59 year old RH F with a PMH of stroke? 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 LKN was 18:30 on 9/27.  Patient had previously taken aspirin, but does not remember why she decided to stop taking it. Patient does not have a neurologist. Patient ambulates with a cane due to right sided weakness/states she drifts to the right without a cane for the past 3 years. Patient states that she gets physical therapy twice per week.     NIHSS: 1 (right hand numbness)  MRS: 2    REVIEW OF SYSTEMS    A 10-system ROS was performed and is negative except for those items noted above and/or in the HPI.    PAST MEDICAL & SURGICAL HISTORY:  Diabetes mellitus    FAMILY HISTORY:  Father had a stroke in his 60's    SOCIAL HISTORY:   T/E/D: last smoked/last used alcohol 10 years ago. patient used to smoke 1 pack during the weekend.   Occupation: retired (worked in police department)     MEDICATIONS (HOME):  Home Medications:  Atorvastatin 80 mg nightly  Lisinopril-HCTZ 20-12.5 mg daily  Acetazolamide 250 mg BID (patient has side effect of diarrhea and does not take it regularly)  Janumet  mg BID w/ meals  Novolog 10 units TID before meals  Levemir 16 units BID    MEDICATIONS  (STANDING):    MEDICATIONS  (PRN):    ALLERGIES/INTOLERANCES:  Allergies  No Known Allergies    Intolerances    VITALS & EXAMINATION:  Vital Signs Last 24 Hrs  T(C): 36.7 (28 Sep 2019 16:21), Max: 36.7 (28 Sep 2019 12:12)  T(F): 98.1 (28 Sep 2019 16:21), Max: 98.1 (28 Sep 2019 13:39)  HR: 75 (28 Sep 2019 16:21) (75 - 88)  BP: 155/89 (28 Sep 2019 16:21) (155/89 - 179/100)  BP(mean): --  RR: 18 (28 Sep 2019 16:21) (18 - 18)  SpO2: 100% (28 Sep 2019 16:21) (99% - 100%)    General:  Constitutional: Obese Female, appears stated age, in no apparent distress including pain  Head: Normocephalic & atraumatic.  ENT: Patent ear canals, intact TM, mucus membranes moist & pink, neck supple, no lymphadenopathy.   Respiratory: Patent airway. All lung fields are clear to auscultation bilaterally.  Extremities: No cyanosis, clubbing, or edema.  Skin: No rashes, bruising, or discoloration.    Cardiovascular (>2): RRR no murmurs. Carotid pulsations symmetric, no bruits. Normal capillary beds refill, 1-2 seconds or less.     Neurological (>12):  MS: Awake, alert, oriented to person, place, situation, time. Normal affect. Follows all commands.    Language: Speech is clear, fluent with good repetition & comprehension (able to name objects___)    CNs: PERRLA (R = 3mm, L = 3mm). VFF. EOMI no nystagmus, no diplopia. V1-3 intact to LT/pinprick, well developed masseter muscles b/l. No facial asymmetry b/l, full eye closure strength b/l. Hearing grossly normal (rubbing fingers) b/l. Symmetric palate elevation in midline. Gag reflex deferred. Head turning & shoulder shrug intact b/l. Tongue midline, normal movements, no atrophy.    Fundoscopic: pale w/ sharp discs margins No vascular changes.      Motor: Normal muscle bulk & tone. No noticeable tremor or seizure. No pronator drift.              Deltoid	Biceps	Triceps	Wrist	Finger ABd	   R	5	5	5	5	5		5 	  L	5	5	5	5	5		5    	H-Flex	H-Ext	H-ABd	H-ADd	K-Flex	K-Ext	D-Flex	P-Flex  R	5	5	5	5	5	5	5	5 	   L	5	5	5	5	5	5	5	5	     Sensation: Intact to LT/PP/Temp/Vibration/Position b/l throughout.     Cortical: Extinction on DSS (neglect): none    Reflexes:              Biceps(C5)       BR(C6)     Triceps(C7)               Patellar(L4)    Achilles(S1)    Plantar Resp  R	2	          2	             2		        2		    2		Down   L	2	          2	             2		        2		    2		Down     Coordination: intact rapid-alt movements. No dysmetria to FTN/HTS    Gait: Normal Romberg. No postural instability. Normal stance and tandem gait.     LABORATORY:  CBC                       12.1   7.9   )-----------( 277      ( 28 Sep 2019 14:02 )             38.4     Chem 09-28    141  |  108  |  48<H>  ----------------------------<  182<H>  5.0   |  21<L>  |  1.74<H>    Ca    10.3      28 Sep 2019 14:02  Phos  3.8     09-28  Mg     1.9     09-28    TPro  7.9  /  Alb  4.1  /  TBili  0.3  /  DBili  x   /  AST  9<L>  /  ALT  6<L>  /  AlkPhos  95  09-28    LFTs LIVER FUNCTIONS - ( 28 Sep 2019 14:02 )  Alb: 4.1 g/dL / Pro: 7.9 g/dL / ALK PHOS: 95 U/L / ALT: 6 U/L / AST: 9 U/L / GGT: x           Coagulopathy PT/INR - ( 28 Sep 2019 14:02 )   PT: 9.7 sec;   INR: 0.85 ratio         PTT - ( 28 Sep 2019 14:02 )  PTT:36.4 sec  Lipid Panel   A1c   Cardiac enzymes     U/A   CSF  Immunological  Other    STUDIES & IMAGING:  Studies (EKG, EEG, EMG, etc):     Radiology (XR, CT, MR, U/S, TTE/LANE):  < from: CT Head No Cont (09.28.19 @ 14:13) >  FINDINGS:   There is no CT evidence of acute intracranial hemorrhage, extra-axial   collection, vasogenic edema, mass effect, midline shift, central   herniation, or hydrocephalus.     Mild mucosal thickening of right maxillary and sphenoid sinuses. The   mastoid air cells and middle ear cavities are clear.    The soft tissues of the scalp are unremarkable. The calvarium is intact.   There has been bilateral lens replacement surgery.    IMPRESSION:   No CT evidence of acute intracranial hemorrhage, extra-axial collection,   or mass effect.     < end of copied text > DISPLAY PLAN FREE TEXT HPI: 59 year old RH F with a PMH of stroke? 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 LKN was 18:30 on 9/27.  Patient had previously taken aspirin, but does not remember why she decided to stop taking it. Patient does not have a neurologist. Patient ambulates with a cane due to right sided weakness/states she drifts to the right without a cane for the past 3 years. Patient states that she gets physical therapy twice per week.     NIHSS: 1 (right hand numbness)  MRS: 2    REVIEW OF SYSTEMS    A 10-system ROS was performed and is negative except for those items noted above and/or in the HPI.    PAST MEDICAL & SURGICAL HISTORY:  Diabetes mellitus    FAMILY HISTORY:  Father had a stroke in his 60's    SOCIAL HISTORY:   T/E/D: last smoked/last used alcohol 10 years ago. patient used to smoke 1 pack during the weekend.   Occupation: retired (worked in police department)     MEDICATIONS (HOME):  Home Medications:  Atorvastatin 80 mg nightly  Lisinopril-HCTZ 20-12.5 mg daily  Acetazolamide 250 mg BID (patient has side effect of diarrhea and does not take it regularly)  Janumet  mg BID w/ meals  Novolog 10 units TID before meals  Levemir 16 units BID    MEDICATIONS  (STANDING):    MEDICATIONS  (PRN):    ALLERGIES/INTOLERANCES:  Allergies  No Known Allergies    Intolerances    VITALS & EXAMINATION:  Vital Signs Last 24 Hrs  T(C): 36.7 (28 Sep 2019 16:21), Max: 36.7 (28 Sep 2019 12:12)  T(F): 98.1 (28 Sep 2019 16:21), Max: 98.1 (28 Sep 2019 13:39)  HR: 75 (28 Sep 2019 16:21) (75 - 88)  BP: 155/89 (28 Sep 2019 16:21) (155/89 - 179/100)  BP(mean): --  RR: 18 (28 Sep 2019 16:21) (18 - 18)  SpO2: 100% (28 Sep 2019 16:21) (99% - 100%)    General:  Constitutional: Obese Female, appears stated age, in no apparent distress including pain  Head: Normocephalic & atraumatic.  ENT: Patent ear canals, intact TM, mucus membranes moist & pink, neck supple, no lymphadenopathy.   Respiratory: Patent airway. All lung fields are clear to auscultation bilaterally.  Extremities: No cyanosis, clubbing, or edema.  Skin: No rashes, bruising, or discoloration.    Cardiovascular (>2): RRR no murmurs. Carotid pulsations symmetric, no bruits. Normal capillary beds refill, 1-2 seconds or less.     Neurological (>12):  MS: Awake, alert, oriented to person, place, situation, time. Normal affect. Follows all commands.    Language: Speech is clear, fluent with good repetition & comprehension (able to name objects___)    CNs: PERRLA (R = 3mm, L = 3mm). VFF. EOMI no nystagmus, no diplopia. V1-3 intact to LT/pinprick, well developed masseter muscles b/l. No facial asymmetry b/l, full eye closure strength b/l. Hearing grossly normal (rubbing fingers) b/l. Symmetric palate elevation in midline. Gag reflex deferred. Head turning & shoulder shrug intact b/l. Tongue midline, normal movements, no atrophy.    Fundoscopic: pale w/ sharp discs margins No vascular changes.      Motor: Normal muscle bulk & tone. No noticeable tremor or seizure. No pronator drift. No motor drift.              Deltoid	Biceps	Triceps	Wrist	Finger ABd	   R	4+	4+	4+	4+	4+		4+ 	  L	4+	4+	4+	4+	4+		4+    	H-Flex	H-Ext			K-Flex	K-Ext	D-Flex	P-Flex  R	4+	5			4	4	5	5 	   L	5	5			4+	4	5	5	     Sensation: decreased to temperature/LT over C6/7/8 dermatomes of right hand.     Cortical: Extinction on DSS (neglect): none    Reflexes:              Biceps(C5)       BR(C6)     Triceps(C7)               Patellar(L4)    Achilles(S1)    Plantar Resp  R	2	          2	             2		        2		    2		equivocal  L	2	          2	             2		        2		    2		equivocal    Coordination: No dysmetria to FTN/HTS    Gait: unable to assess     LABORATORY:  CBC                       12.1   7.9   )-----------( 277      ( 28 Sep 2019 14:02 )             38.4     Chem 09-28    141  |  108  |  48<H>  ----------------------------<  182<H>  5.0   |  21<L>  |  1.74<H>    Ca    10.3      28 Sep 2019 14:02  Phos  3.8     09-28  Mg     1.9     09-28    TPro  7.9  /  Alb  4.1  /  TBili  0.3  /  DBili  x   /  AST  9<L>  /  ALT  6<L>  /  AlkPhos  95  09-28    LFTs LIVER FUNCTIONS - ( 28 Sep 2019 14:02 )  Alb: 4.1 g/dL / Pro: 7.9 g/dL / ALK PHOS: 95 U/L / ALT: 6 U/L / AST: 9 U/L / GGT: x           Coagulopathy PT/INR - ( 28 Sep 2019 14:02 )   PT: 9.7 sec;   INR: 0.85 ratio         PTT - ( 28 Sep 2019 14:02 )  PTT:36.4 sec  Lipid Panel   A1c   Cardiac enzymes     U/A   CSF  Immunological  Other    STUDIES & IMAGING:  Studies (EKG, EEG, EMG, etc):     Radiology (XR, CT, MR, U/S, TTE/LANE):  < from: CT Head No Cont (09.28.19 @ 14:13) >  FINDINGS:   There is no CT evidence of acute intracranial hemorrhage, extra-axial   collection, vasogenic edema, mass effect, midline shift, central   herniation, or hydrocephalus.     Mild mucosal thickening of right maxillary and sphenoid sinuses. The   mastoid air cells and middle ear cavities are clear.    The soft tissues of the scalp are unremarkable. The calvarium is intact.   There has been bilateral lens replacement surgery.    IMPRESSION:   No CT evidence of acute intracranial hemorrhage, extra-axial collection,   or mass effect.     < end of copied text > HPI: 59 year old RH F with a PMH of stroke? 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 LKN was 18:30 on 9/27.  Patient had previously taken aspirin, but does not remember why she decided to stop taking it. Patient does not have a neurologist. Patient ambulates with a cane due to right sided weakness/states she drifts to the right without a cane for the past 3 years. Patient states that she gets physical therapy twice per week.     NIHSS: 1 (right hand numbness)  MRS: 2    REVIEW OF SYSTEMS    A 10-system ROS was performed and is negative except for those items noted above and/or in the HPI.    PAST MEDICAL & SURGICAL HISTORY:  Diabetes mellitus    FAMILY HISTORY:  Father had a stroke in his 60's    SOCIAL HISTORY:   T/E/D: last smoked/last used alcohol 10 years ago. patient used to smoke 1 pack during the weekend.   Occupation: retired (worked in police department)     MEDICATIONS (HOME):  Home Medications:  Atorvastatin 80 mg nightly  Lisinopril-HCTZ 20-12.5 mg daily  Acetazolamide 250 mg BID (patient has side effect of diarrhea and does not take it regularly)  Janumet  mg BID w/ meals  Novolog 10 units TID before meals  Levemir 16 units BID    MEDICATIONS  (STANDING):    MEDICATIONS  (PRN):    ALLERGIES/INTOLERANCES:  Allergies  No Known Allergies    Intolerances    VITALS & EXAMINATION:  Vital Signs Last 24 Hrs  T(C): 36.7 (28 Sep 2019 16:21), Max: 36.7 (28 Sep 2019 12:12)  T(F): 98.1 (28 Sep 2019 16:21), Max: 98.1 (28 Sep 2019 13:39)  HR: 75 (28 Sep 2019 16:21) (75 - 88)  BP: 155/89 (28 Sep 2019 16:21) (155/89 - 179/100)  BP(mean): --  RR: 18 (28 Sep 2019 16:21) (18 - 18)  SpO2: 100% (28 Sep 2019 16:21) (99% - 100%)    General: Female, appears stated age, in no apparent distress including pain    Neurological (>12):  MS: Awake, alert, oriented to person, place, situation, time. Normal affect. Follows all commands.    Language: Speech is clear, fluent with good repetition & comprehension (able to name objects)    CNs: Visual acuity OD: NLP, VFF OS only. EOMI no nystagmus. V1-3 intact to LT, no facial asymmetry b/l, hearing grossly normal (rubbing fingers) b/l. Symmetric palate elevation in midline. Gag reflex deferred. Head turning & shoulder shrug intact b/l. Tongue midline, normal movements, no atrophy.    Fundoscopic: pale w/ sharp discs margins No vascular changes.      Motor: Normal muscle bulk & tone. No noticeable tremor or seizure. No pronator drift. No motor drift.              Deltoid	Biceps	Triceps	Wrist	Finger ABd	   R	4+	4+	4+	4+	4+		4+ 	  L	4+	4+	4+	4+	4+		4+    	H-Flex	H-Ext			K-Flex	K-Ext	D-Flex	P-Flex  R	4+	5			4	4	5	5 	   L	5	5			4+	4	5	5	     Sensation: decreased to temperature/LT over C6/7/8 dermatomes of right hand.     Cortical: Extinction on DSS (neglect): none    Reflexes:              Biceps(C5)       BR(C6)     Triceps(C7)               Patellar(L4)    Achilles(S1)    Plantar Resp  R	2	          2	             2		        2		    2		equivocal  L	2	          2	             2		        2		    2		equivocal    Coordination: No dysmetria to FTN/HTS    Gait: unable to assess     LABORATORY:  CBC                       12.1   7.9   )-----------( 277      ( 28 Sep 2019 14:02 )             38.4     Chem 09-28    141  |  108  |  48<H>  ----------------------------<  182<H>  5.0   |  21<L>  |  1.74<H>    Ca    10.3      28 Sep 2019 14:02  Phos  3.8     09-28  Mg     1.9     09-28    TPro  7.9  /  Alb  4.1  /  TBili  0.3  /  DBili  x   /  AST  9<L>  /  ALT  6<L>  /  AlkPhos  95  09-28    LFTs LIVER FUNCTIONS - ( 28 Sep 2019 14:02 )  Alb: 4.1 g/dL / Pro: 7.9 g/dL / ALK PHOS: 95 U/L / ALT: 6 U/L / AST: 9 U/L / GGT: x           Coagulopathy PT/INR - ( 28 Sep 2019 14:02 )   PT: 9.7 sec;   INR: 0.85 ratio         PTT - ( 28 Sep 2019 14:02 )  PTT:36.4 sec  Lipid Panel   A1c   Cardiac enzymes     U/A   CSF  Immunological  Other    STUDIES & IMAGING:  Studies (EKG, EEG, EMG, etc):     Radiology (XR, CT, MR, U/S, TTE/LANE):  < from: CT Head No Cont (09.28.19 @ 14:13) >  FINDINGS:   There is no CT evidence of acute intracranial hemorrhage, extra-axial   collection, vasogenic edema, mass effect, midline shift, central   herniation, or hydrocephalus.     Mild mucosal thickening of right maxillary and sphenoid sinuses. The   mastoid air cells and middle ear cavities are clear.    The soft tissues of the scalp are unremarkable. The calvarium is intact.   There has been bilateral lens replacement surgery.    IMPRESSION:   No CT evidence of acute intracranial hemorrhage, extra-axial collection,   or mass effect.     < end of copied text >

## 2023-02-14 NOTE — DISCHARGE NOTE NURSING/CASE MANAGEMENT/SOCIAL WORK - NSFLUVACAGEDISCH_IMM_ALL_CORE
Pts IV on left AC keeps beeping for occlusion.   Attempted IV insertion on left hand. Pt didn't tolerate well. She reported being a hard stick.   Was going to attempt right forearm, pt said that arm is even harder to access.   Primary RN will call [facility] for new IV.    Adult

## 2024-02-27 NOTE — ED ADULT TRIAGE NOTE - NS ED TRIAGE AVPU SCALE
As above, pt recoverin well, now POD#3 S/P primary C/S.  Tuscarora removed this am, steri strips placed.  Incision C/D/I.  OK for D/C home.  F/U in office in 2 wks. As above, pt recovering well now POD#2 S/P primary C/S, but plan made for D/C home tomorrow am at pt's request.  She cannot take any NSAIDS as per her hematologist (H/O ITP) and pain management still a factor as she is trying not to take anything except Tylenol.  Plan for D/C home tomorrow am but she may be cleared for D/C sooner prn. Alert-The patient is alert, awake and responds to voice. The patient is oriented to time, place, and person. The triage nurse is able to obtain subjective information. As above, pt is POD#1 S/P primary C/S for NRFHT doing well.  She is OOB, +voiding, breast feeding without difficulty.  VSS.  Abd soft, NT, ND.  Incision clean, dry.  Possible D/C tomorrow am pending exam at that time.

## 2024-05-03 NOTE — ED ADULT NURSE NOTE - NS ED NURSE LEVEL OF CONSCIOUSNESS SPEECH
Speaking Coherently PAST MEDICAL HISTORY:  [facility] AGE 69 X1 EPISODE    Asymptomatic hypertension     Early stage skin cancer SQAMOUS AND BASAL CELL SKIN CA    History of medical problems hiatal hernia, tia no residual    Low back pain BULGING DISCS LUMBAR SPINE    Mitral regurgitation     Murmur, heart     OA (osteoarthritis)     Obesity with body mass index (BMI) of 30.0 to 39.9     Peripheral neuropathy

## 2024-11-02 NOTE — SPEECH LANGUAGE PATHOLOGY EVALUATION - SPECIFY REASON(S)
subjective assessment of the oropharyngeal swallow mechanism. assessment of speech, language, and cognition English

## 2024-11-27 NOTE — PROGRESS NOTE ADULT - I WAS PHYSICALLY PRESENT FOR THE KEY PORTIONS OF THE EVALUATION AND MANAGEMENT (E/M) SERVICE PROVIDED.  I AGREE WITH THE ABOVE HISTORY, PHYSICAL, AND PLAN WHICH I HAVE REVIEWED AND EDITED WHERE APPROPRIATE
----- Message from Jessica LEVINE sent at 11/27/2024  9:49 AM EST -----  Regarding: [facility]- Outside resources Packet  Good morning,    This message is to kindly request a packet with outside resources to be mailed out to address in chart.    Thank you,  Jessica  
Outside resource packet placed in outgoing mail.  
Statement Selected

## 2025-04-15 NOTE — ED ADULT NURSE NOTE - NS TRANSFER PATIENT BELONGINGS
Maricruz Cardenas (:  1949) is a 76 y.o. female,Established patient, here for evaluation of the following chief complaint(s):  3 Month Follow-Up (She presents to the office today for a 3 month follow up. Her  passed away on 3/31/2025./), Discuss Labs (Labs completed 2025.), and Joint Swelling (She complains of swelling in both her ankles for the past month.)      Subjective   SUBJECTIVE/OBJECTIVE:  HPI  Hypertension:  Patient is here for follow up chronic hypertension.  This is  generally controlled on current medication regimen.    BP Readings from Last 1 Encounters:   04/15/25 130/68        Takes meds as directed and tolerates them well.  Most recent labs reviewed with patient and are not remarkable.  No symptoms from htn standpoint per ROS.  Patient is  compliant with lifestyle modifications.  Patient does not smoke.  Comorbid conditions include obesity  DM2:   Patient is here to fu regarding DM2. Patient is  controlled.  Taking all medications and tolerating well.  Currently on meds.  Fasting s.  Patient is taking ASA .  No hypoglycemic episodes.  Saw an Eye Dr . Patient is aware that it is necessary to see an Eye Dr yearly.  Patient does not smoke.  Most recent labs reviewed with patient.  Patient does not have complaints or concerns today. Patient     Bilateral leg edema  Mild around the ankle both sides got worse lately because she was sitting next to her sick  who passed away after that the swelling is better but did not subside completely she denies chest pain or shortness of breath.  She has not been watching salt intake.  She is taking medications including amlodipine and Celebrex that can cause leg edema.    Review of Systems   Constitutional: Negative.  Negative for activity change, appetite change, chills, fatigue and fever.   HENT: Negative.  Negative for congestion, ear pain, facial swelling, hearing loss, postnasal drip, rhinorrhea and sinus pain.    Eyes: Negative.  Negative 
Clothing

## 2025-07-17 NOTE — ED CDU PROVIDER INITIAL DAY NOTE - NS_OBSORDERDATE_ED_A_ED
[facility] [place]        If you are experiencing any symptoms after discharge, please follow up with your doctor for further instructions.    If you are running late to your appointment, please call the phone number listed below to inform us.    [facility]   [address] [place] [address]     Hours of Operation  Monday, Tuesday, Wednesday, Thursday: 7:00 am-7:00 pm  Friday: 7:00 am- 5:30 pm  Saturday 8:00am- 11:30am     Infusion Scheduling  P:[phone]  F: [phone]       **If your infusion requires blood work be sure to have labs drawn 24-48 hrs prior to your scheduled appointment **       [facility] Lab Hours: Located On The First Floor   Walk in or by appointment  Call Central Scheduling ([phone]) for lab appointment  Monday-Friday: 6:00 am-6:30 pm  Saturday: 6:00 am- 2:30 pm   Sunday: Closed       28-Sep-2019 18:19